# Patient Record
Sex: MALE | Race: WHITE | Employment: PART TIME | URBAN - METROPOLITAN AREA
[De-identification: names, ages, dates, MRNs, and addresses within clinical notes are randomized per-mention and may not be internally consistent; named-entity substitution may affect disease eponyms.]

---

## 2021-09-09 ENCOUNTER — HOSPITAL ENCOUNTER (EMERGENCY)
Facility: CLINIC | Age: 51
End: 2021-09-09
Payer: MEDICAID

## 2021-09-12 ENCOUNTER — APPOINTMENT (OUTPATIENT)
Dept: CT IMAGING | Facility: CLINIC | Age: 51
End: 2021-09-12
Attending: EMERGENCY MEDICINE
Payer: MEDICAID

## 2021-09-12 ENCOUNTER — HOSPITAL ENCOUNTER (INPATIENT)
Facility: CLINIC | Age: 51
LOS: 11 days | Discharge: NURSING, REHAB OR LONG-TERM CARE | End: 2021-09-23
Attending: EMERGENCY MEDICINE | Admitting: INTERNAL MEDICINE
Payer: MEDICAID

## 2021-09-12 DIAGNOSIS — E11.65 TYPE 2 DIABETES MELLITUS WITH HYPERGLYCEMIA, UNSPECIFIED WHETHER LONG TERM INSULIN USE (H): Primary | ICD-10-CM

## 2021-09-12 DIAGNOSIS — F41.9 ANXIETY: ICD-10-CM

## 2021-09-12 DIAGNOSIS — I10 ESSENTIAL HYPERTENSION: ICD-10-CM

## 2021-09-12 DIAGNOSIS — L03.221 CELLULITIS AND ABSCESS OF NECK: ICD-10-CM

## 2021-09-12 DIAGNOSIS — L97.519 ULCER OF RIGHT FOOT, UNSPECIFIED ULCER STAGE (H): ICD-10-CM

## 2021-09-12 DIAGNOSIS — A41.9 ACUTE SEPSIS (H): ICD-10-CM

## 2021-09-12 DIAGNOSIS — L02.11 CELLULITIS AND ABSCESS OF NECK: ICD-10-CM

## 2021-09-12 DIAGNOSIS — K59.03 DRUG-INDUCED CONSTIPATION: ICD-10-CM

## 2021-09-12 LAB
ALBUMIN SERPL-MCNC: 2.6 G/DL (ref 3.4–5)
ALP SERPL-CCNC: 178 U/L (ref 40–150)
ALT SERPL W P-5'-P-CCNC: 13 U/L (ref 0–70)
ANION GAP SERPL CALCULATED.3IONS-SCNC: 10 MMOL/L (ref 3–14)
AST SERPL W P-5'-P-CCNC: 7 U/L (ref 0–45)
BASOPHILS # BLD AUTO: 0.1 10E3/UL (ref 0–0.2)
BASOPHILS NFR BLD AUTO: 1 %
BILIRUB SERPL-MCNC: 0.6 MG/DL (ref 0.2–1.3)
BUN SERPL-MCNC: 17 MG/DL (ref 7–30)
CALCIUM SERPL-MCNC: 9.1 MG/DL (ref 8.5–10.1)
CHLORIDE BLD-SCNC: 97 MMOL/L (ref 94–109)
CO2 SERPL-SCNC: 26 MMOL/L (ref 20–32)
CREAT SERPL-MCNC: 1.24 MG/DL (ref 0.66–1.25)
EOSINOPHIL # BLD AUTO: 0.2 10E3/UL (ref 0–0.7)
EOSINOPHIL NFR BLD AUTO: 1 %
ERYTHROCYTE [DISTWIDTH] IN BLOOD BY AUTOMATED COUNT: 12.2 % (ref 10–15)
GFR SERPL CREATININE-BSD FRML MDRD: 66 ML/MIN/1.73M2
GLUCOSE BLD-MCNC: 430 MG/DL (ref 70–99)
GLUCOSE BLDC GLUCOMTR-MCNC: 320 MG/DL (ref 70–99)
GLUCOSE BLDC GLUCOMTR-MCNC: 386 MG/DL (ref 70–99)
HCO3 BLDV-SCNC: 29 MMOL/L (ref 21–28)
HCT VFR BLD AUTO: 39.6 % (ref 40–53)
HGB BLD-MCNC: 13.2 G/DL (ref 13.3–17.7)
IMM GRANULOCYTES # BLD: 0.2 10E3/UL
IMM GRANULOCYTES NFR BLD: 1 %
LACTATE BLD-SCNC: 1.2 MMOL/L
LYMPHOCYTES # BLD AUTO: 1.1 10E3/UL (ref 0.8–5.3)
LYMPHOCYTES NFR BLD AUTO: 7 %
MAGNESIUM SERPL-MCNC: 1.8 MG/DL (ref 1.6–2.3)
MCH RBC QN AUTO: 27.7 PG (ref 26.5–33)
MCHC RBC AUTO-ENTMCNC: 33.3 G/DL (ref 31.5–36.5)
MCV RBC AUTO: 83 FL (ref 78–100)
MONOCYTES # BLD AUTO: 1.4 10E3/UL (ref 0–1.3)
MONOCYTES NFR BLD AUTO: 8 %
NEUTROPHILS # BLD AUTO: 13.4 10E3/UL (ref 1.6–8.3)
NEUTROPHILS NFR BLD AUTO: 82 %
NRBC # BLD AUTO: 0 10E3/UL
NRBC BLD AUTO-RTO: 0 /100
PCO2 BLDV: 50 MM HG (ref 40–50)
PH BLDV: 7.37 [PH] (ref 7.32–7.43)
PLATELET # BLD AUTO: 322 10E3/UL (ref 150–450)
PO2 BLDV: 18 MM HG (ref 25–47)
POTASSIUM BLD-SCNC: 4.3 MMOL/L (ref 3.4–5.3)
PROT SERPL-MCNC: 7.9 G/DL (ref 6.8–8.8)
RBC # BLD AUTO: 4.77 10E6/UL (ref 4.4–5.9)
SAO2 % BLDV: 25 % (ref 94–100)
SARS-COV-2 RNA RESP QL NAA+PROBE: NEGATIVE
SODIUM SERPL-SCNC: 133 MMOL/L (ref 133–144)
WBC # BLD AUTO: 16.4 10E3/UL (ref 4–11)

## 2021-09-12 PROCEDURE — 258N000003 HC RX IP 258 OP 636: Performed by: INTERNAL MEDICINE

## 2021-09-12 PROCEDURE — 70491 CT SOFT TISSUE NECK W/DYE: CPT

## 2021-09-12 PROCEDURE — 82803 BLOOD GASES ANY COMBINATION: CPT

## 2021-09-12 PROCEDURE — 96375 TX/PRO/DX INJ NEW DRUG ADDON: CPT

## 2021-09-12 PROCEDURE — 93005 ELECTROCARDIOGRAM TRACING: CPT

## 2021-09-12 PROCEDURE — 250N000009 HC RX 250: Performed by: EMERGENCY MEDICINE

## 2021-09-12 PROCEDURE — 36415 COLL VENOUS BLD VENIPUNCTURE: CPT | Performed by: EMERGENCY MEDICINE

## 2021-09-12 PROCEDURE — 250N000011 HC RX IP 250 OP 636: Performed by: EMERGENCY MEDICINE

## 2021-09-12 PROCEDURE — 83036 HEMOGLOBIN GLYCOSYLATED A1C: CPT | Performed by: INTERNAL MEDICINE

## 2021-09-12 PROCEDURE — 87635 SARS-COV-2 COVID-19 AMP PRB: CPT | Performed by: EMERGENCY MEDICINE

## 2021-09-12 PROCEDURE — 250N000012 HC RX MED GY IP 250 OP 636 PS 637: Performed by: INTERNAL MEDICINE

## 2021-09-12 PROCEDURE — C9803 HOPD COVID-19 SPEC COLLECT: HCPCS

## 2021-09-12 PROCEDURE — 90471 IMMUNIZATION ADMIN: CPT | Performed by: EMERGENCY MEDICINE

## 2021-09-12 PROCEDURE — 96365 THER/PROPH/DIAG IV INF INIT: CPT

## 2021-09-12 PROCEDURE — 85025 COMPLETE CBC W/AUTO DIFF WBC: CPT | Performed by: EMERGENCY MEDICINE

## 2021-09-12 PROCEDURE — 258N000003 HC RX IP 258 OP 636: Performed by: EMERGENCY MEDICINE

## 2021-09-12 PROCEDURE — 99223 1ST HOSP IP/OBS HIGH 75: CPT | Mod: AI | Performed by: INTERNAL MEDICINE

## 2021-09-12 PROCEDURE — 99285 EMERGENCY DEPT VISIT HI MDM: CPT | Mod: 25

## 2021-09-12 PROCEDURE — 83735 ASSAY OF MAGNESIUM: CPT | Performed by: INTERNAL MEDICINE

## 2021-09-12 PROCEDURE — 250N000013 HC RX MED GY IP 250 OP 250 PS 637: Performed by: INTERNAL MEDICINE

## 2021-09-12 PROCEDURE — 82040 ASSAY OF SERUM ALBUMIN: CPT | Performed by: EMERGENCY MEDICINE

## 2021-09-12 PROCEDURE — 87040 BLOOD CULTURE FOR BACTERIA: CPT | Performed by: EMERGENCY MEDICINE

## 2021-09-12 PROCEDURE — 90715 TDAP VACCINE 7 YRS/> IM: CPT | Performed by: EMERGENCY MEDICINE

## 2021-09-12 PROCEDURE — 120N000001 HC R&B MED SURG/OB

## 2021-09-12 RX ORDER — PIPERACILLIN SODIUM, TAZOBACTAM SODIUM 4; .5 G/20ML; G/20ML
4.5 INJECTION, POWDER, LYOPHILIZED, FOR SOLUTION INTRAVENOUS ONCE
Status: COMPLETED | OUTPATIENT
Start: 2021-09-12 | End: 2021-09-12

## 2021-09-12 RX ORDER — HYDROMORPHONE HYDROCHLORIDE 1 MG/ML
.3-.5 INJECTION, SOLUTION INTRAMUSCULAR; INTRAVENOUS; SUBCUTANEOUS
Status: DISCONTINUED | OUTPATIENT
Start: 2021-09-12 | End: 2021-09-19

## 2021-09-12 RX ORDER — AMOXICILLIN 250 MG
1 CAPSULE ORAL 2 TIMES DAILY PRN
Status: DISCONTINUED | OUTPATIENT
Start: 2021-09-12 | End: 2021-09-23 | Stop reason: HOSPADM

## 2021-09-12 RX ORDER — NALOXONE HYDROCHLORIDE 0.4 MG/ML
0.4 INJECTION, SOLUTION INTRAMUSCULAR; INTRAVENOUS; SUBCUTANEOUS
Status: DISCONTINUED | OUTPATIENT
Start: 2021-09-12 | End: 2021-09-23 | Stop reason: HOSPADM

## 2021-09-12 RX ORDER — ACETAMINOPHEN 650 MG/1
650 SUPPOSITORY RECTAL EVERY 6 HOURS PRN
Status: DISCONTINUED | OUTPATIENT
Start: 2021-09-12 | End: 2021-09-23 | Stop reason: HOSPADM

## 2021-09-12 RX ORDER — DEXTROSE MONOHYDRATE 25 G/50ML
25-50 INJECTION, SOLUTION INTRAVENOUS
Status: DISCONTINUED | OUTPATIENT
Start: 2021-09-12 | End: 2021-09-23 | Stop reason: HOSPADM

## 2021-09-12 RX ORDER — PIPERACILLIN SODIUM, TAZOBACTAM SODIUM 3; .375 G/15ML; G/15ML
3.38 INJECTION, POWDER, LYOPHILIZED, FOR SOLUTION INTRAVENOUS EVERY 6 HOURS
Status: DISCONTINUED | OUTPATIENT
Start: 2021-09-13 | End: 2021-09-16

## 2021-09-12 RX ORDER — LIDOCAINE 40 MG/G
CREAM TOPICAL
Status: DISCONTINUED | OUTPATIENT
Start: 2021-09-12 | End: 2021-09-23 | Stop reason: HOSPADM

## 2021-09-12 RX ORDER — OXYCODONE HYDROCHLORIDE 5 MG/1
5-10 TABLET ORAL EVERY 4 HOURS PRN
Status: DISCONTINUED | OUTPATIENT
Start: 2021-09-12 | End: 2021-09-19

## 2021-09-12 RX ORDER — SODIUM CHLORIDE 9 MG/ML
INJECTION, SOLUTION INTRAVENOUS CONTINUOUS
Status: DISCONTINUED | OUTPATIENT
Start: 2021-09-12 | End: 2021-09-15

## 2021-09-12 RX ORDER — IOPAMIDOL 755 MG/ML
80 INJECTION, SOLUTION INTRAVASCULAR ONCE
Status: COMPLETED | OUTPATIENT
Start: 2021-09-12 | End: 2021-09-12

## 2021-09-12 RX ORDER — NALOXONE HYDROCHLORIDE 0.4 MG/ML
0.2 INJECTION, SOLUTION INTRAMUSCULAR; INTRAVENOUS; SUBCUTANEOUS
Status: DISCONTINUED | OUTPATIENT
Start: 2021-09-12 | End: 2021-09-23 | Stop reason: HOSPADM

## 2021-09-12 RX ORDER — ONDANSETRON 2 MG/ML
4 INJECTION INTRAMUSCULAR; INTRAVENOUS EVERY 6 HOURS PRN
Status: DISCONTINUED | OUTPATIENT
Start: 2021-09-12 | End: 2021-09-23 | Stop reason: HOSPADM

## 2021-09-12 RX ORDER — AMOXICILLIN 250 MG
2 CAPSULE ORAL 2 TIMES DAILY PRN
Status: DISCONTINUED | OUTPATIENT
Start: 2021-09-12 | End: 2021-09-23 | Stop reason: HOSPADM

## 2021-09-12 RX ORDER — DIAZEPAM 10 MG/2ML
5-10 INJECTION, SOLUTION INTRAMUSCULAR; INTRAVENOUS EVERY 4 HOURS PRN
Status: DISCONTINUED | OUTPATIENT
Start: 2021-09-12 | End: 2021-09-13

## 2021-09-12 RX ORDER — HYDROMORPHONE HYDROCHLORIDE 1 MG/ML
0.5 INJECTION, SOLUTION INTRAMUSCULAR; INTRAVENOUS; SUBCUTANEOUS
Status: DISCONTINUED | OUTPATIENT
Start: 2021-09-12 | End: 2021-09-12

## 2021-09-12 RX ORDER — VANCOMYCIN HYDROCHLORIDE 1 G/200ML
1000 INJECTION, SOLUTION INTRAVENOUS EVERY 12 HOURS
Status: DISCONTINUED | OUTPATIENT
Start: 2021-09-13 | End: 2021-09-14

## 2021-09-12 RX ORDER — ACETAMINOPHEN 325 MG/1
650 TABLET ORAL EVERY 6 HOURS PRN
Status: DISCONTINUED | OUTPATIENT
Start: 2021-09-12 | End: 2021-09-23 | Stop reason: HOSPADM

## 2021-09-12 RX ORDER — NICOTINE POLACRILEX 4 MG
15-30 LOZENGE BUCCAL
Status: DISCONTINUED | OUTPATIENT
Start: 2021-09-12 | End: 2021-09-23 | Stop reason: HOSPADM

## 2021-09-12 RX ORDER — CLONIDINE HYDROCHLORIDE 0.1 MG/1
0.1 TABLET ORAL EVERY 8 HOURS
Status: DISCONTINUED | OUTPATIENT
Start: 2021-09-12 | End: 2021-09-13

## 2021-09-12 RX ORDER — ONDANSETRON 4 MG/1
4 TABLET, ORALLY DISINTEGRATING ORAL EVERY 6 HOURS PRN
Status: DISCONTINUED | OUTPATIENT
Start: 2021-09-12 | End: 2021-09-23 | Stop reason: HOSPADM

## 2021-09-12 RX ADMIN — SODIUM CHLORIDE 70 ML: 9 INJECTION, SOLUTION INTRAVENOUS at 19:32

## 2021-09-12 RX ADMIN — VANCOMYCIN HYDROCHLORIDE 2500 MG: 5 INJECTION, POWDER, LYOPHILIZED, FOR SOLUTION INTRAVENOUS at 19:50

## 2021-09-12 RX ADMIN — SODIUM CHLORIDE 1000 ML: 9 INJECTION, SOLUTION INTRAVENOUS at 18:33

## 2021-09-12 RX ADMIN — CLONIDINE HYDROCHLORIDE 0.1 MG: 0.1 TABLET ORAL at 23:17

## 2021-09-12 RX ADMIN — IOPAMIDOL 80 ML: 755 INJECTION, SOLUTION INTRAVENOUS at 19:32

## 2021-09-12 RX ADMIN — HYDROMORPHONE HYDROCHLORIDE 0.5 MG: 1 INJECTION, SOLUTION INTRAMUSCULAR; INTRAVENOUS; SUBCUTANEOUS at 18:33

## 2021-09-12 RX ADMIN — CLOSTRIDIUM TETANI TOXOID ANTIGEN (FORMALDEHYDE INACTIVATED), CORYNEBACTERIUM DIPHTHERIAE TOXOID ANTIGEN (FORMALDEHYDE INACTIVATED), BORDETELLA PERTUSSIS TOXOID ANTIGEN (GLUTARALDEHYDE INACTIVATED), BORDETELLA PERTUSSIS FILAMENTOUS HEMAGGLUTININ ANTIGEN (FORMALDEHYDE INACTIVATED), BORDETELLA PERTUSSIS PERTACTIN ANTIGEN, AND BORDETELLA PERTUSSIS FIMBRIAE 2/3 ANTIGEN 0.5 ML: 5; 2; 2.5; 5; 3; 5 INJECTION, SUSPENSION INTRAMUSCULAR at 18:35

## 2021-09-12 RX ADMIN — SODIUM CHLORIDE, PRESERVATIVE FREE: 5 INJECTION INTRAVENOUS at 23:22

## 2021-09-12 RX ADMIN — INSULIN GLARGINE 20 UNITS: 100 INJECTION, SOLUTION SUBCUTANEOUS at 23:18

## 2021-09-12 RX ADMIN — PIPERACILLIN SODIUM AND TAZOBACTAM SODIUM 4.5 G: 4; .5 INJECTION, POWDER, LYOPHILIZED, FOR SOLUTION INTRAVENOUS at 18:37

## 2021-09-12 ASSESSMENT — ENCOUNTER SYMPTOMS
RHINORRHEA: 0
NUMBNESS: 0
VOMITING: 0
DIARRHEA: 0
CHEST TIGHTNESS: 0
NECK STIFFNESS: 0
WHEEZING: 0
CHILLS: 1
NAUSEA: 0
FEVER: 0
PALPITATIONS: 0
NECK PAIN: 1
SHORTNESS OF BREATH: 0
SORE THROAT: 0
COUGH: 0
ABDOMINAL PAIN: 0
DYSURIA: 0

## 2021-09-12 ASSESSMENT — MIFFLIN-ST. JEOR: SCORE: 2053.74

## 2021-09-12 NOTE — ED PROVIDER NOTES
History     Chief Complaint:  Wound Check      HPI   Chris Shea is a 50 year old diabetic male who admits to Methamphetamine abuse (denies IVDA) who presents by ambulance with chief complaint of infection to the back of his neck.  His glucose was 388 by the paramedics. The patient states that 2 to 3 days ago he noticed pimples on the back of his neck which he picked at which has rapidly progressed to a large area of the painful swelling to the back of his neck making it hard for him to lay flat on his back.  He has had some chills but no fever.  He denies cough/cold symptoms, denies chest pain or shortness of breath, denies nausea or vomiting or diarrhea.  No known COVID-19 exposure.  He admits to methamphetamine use, but denies IV drug abuse.  He denies any previous history of staph infection.  He also notes that he has a chronic ulcer on the bottom of his right foot.    Review of Systems   Constitutional: Positive for chills. Negative for fever.   HENT: Negative for rhinorrhea and sore throat.    Respiratory: Negative for cough, chest tightness, shortness of breath and wheezing.    Cardiovascular: Negative for chest pain, palpitations and leg swelling.   Gastrointestinal: Negative for abdominal pain, diarrhea, nausea and vomiting.   Genitourinary: Negative for dysuria.   Musculoskeletal: Positive for neck pain. Negative for neck stiffness.   Neurological: Negative for numbness.   All other systems reviewed and are negative.        Allergies:  The patient has no known allergies.    Medications:    Flexeril   Metformin     Past Medical History:    Diabetes, Type 2  HTN  Smoker  Back pain  Herniated Lumbar disc    Past Surgical History:    Shoulder surgery    Family History:    No family history on file.    Social History:  Methamphetamine use  Smoker    Physical Exam     Patient Vitals for the past 24 hrs:   BP Temp Temp src Pulse Resp SpO2 Height Weight   09/12/21 2239 121/80 97.5  F (36.4  C) Oral 89 16 98  "% -- --   09/12/21 1830 129/79 -- -- 105 16 98 % -- --   09/12/21 1817 114/74 98.4  F (36.9  C) Oral 103 17 96 % 1.88 m (6' 2\") 113.4 kg (250 lb)       Physical Exam  Nursing note and vitals reviewed.  Constitutional:  Appears well-developed and well-nourished.   HENT:   Head:    Atraumatic.   Mouth/Throat:   Oropharynx is clear and moist. No oropharyngeal exudate.   Eyes:    Pupils are equal, round, and reactive to light.   Neck:    Abscess as noted in skin exam.  Normal range of motion. Neck supple.      No tracheal deviation present. No thyromegaly present.   Cardiovascular:  Normal rate, regular rhythm, no murmur   Pulmonary/Chest: Breath sounds are clear and equal without wheezes or crackles.  Abdominal:   Soft. Bowel sounds are normal. Exhibits no distension and      no mass. There is no tenderness.      There is no rebound and no guarding.   Musculoskeletal:  Exhibits no edema.   Lymphadenopathy:  No cervical adenopathy.   Neurological:   Alert and oriented to person, place, and time.   Skin:    Skin is warm and dry. No rash noted. No pallor. Right foot:  2 cm diameter deep ulcer to plantar aspect of right foot under ball of foot.  No surrounding cellulitis.  8 cm vertical X 10 cm horizontal diameter area of abscess with cellulitis with swelling and dark erythema and firm tenderness to the back of the neck with central pustules.      Emergency Department Course   ECG:  ECG taken at 18:29, ECG read at 18:31  Rate 105 bpm. OH interval 134 ms. QRS duration 100 ms. QT/QTc 346/457 ms. P-R-T axes 61 76 28.     Imaging:  Soft tissue neck CT w contrast   Final Result   IMPRESSION:     Ill-defined, moderately extensive inflammatory process involving the   posterior upper cervical and occipitocervical soft tissues. No   evidence for discrete drainable abscess, bone involvement or deep   intraspinal extension. Mild muscular swelling and interspersed edema   noted. Probable extension of presumed cellulitis into the " spaces,   however myositis remains a possibility. No evidence for discrete,   intramuscular masses or fluid collections.         HAY JONES MD            SYSTEM ID:  [id number]          Laboratory:  Labs Ordered and Resulted from Time of ED Arrival Up to the Time of Departure from the ED   COMPREHENSIVE METABOLIC PANEL - Abnormal; Notable for the following components:       Result Value    Glucose 430 (*)     Alkaline Phosphatase 178 (*)     Albumin 2.6 (*)     All other components within normal limits   CBC WITH PLATELETS AND DIFFERENTIAL - Abnormal; Notable for the following components:    WBC Count 16.4 (*)     Hemoglobin 13.2 (*)     Hematocrit 39.6 (*)     Absolute Neutrophils 13.4 (*)     Absolute Monocytes 1.4 (*)     Absolute Immature Granulocytes 0.2 (*)     All other components within normal limits   ISTAT GASES LACTATE VENOUS POCT - Abnormal; Notable for the following components:    Bicarbonate Venous POCT 29 (*)     O2 Sat, Venous POCT 25 (*)     pO2 Venous POCT 18 (*)     All other components within normal limits   GLUCOSE BY METER - Abnormal; Notable for the following components:    GLUCOSE BY METER POCT 320 (*)     All other components within normal limits   COVID-19 VIRUS (CORONAVIRUS) BY PCR - Normal    Narrative:     Testing was performed using the sandro  SARS-CoV-2 & Influenza A/B Assay on the sandro  Neeta  System.  This test should be ordered for the detection of SARS-COV-2 in individuals who meet SARS-CoV-2 clinical and/or epidemiological criteria. Test performance is unknown in asymptomatic patients.  This test is for in vitro diagnostic use under the FDA EUA for laboratories certified under CLIA to perform moderate and/or high complexity testing. This test has not been FDA cleared or approved.  A negative test does not rule out the presence of PCR inhibitors in the specimen or target RNA in concentration below the limit of detection for the assay. The possibility of a false negative  should be considered if the patient's recent exposure or clinical presentation suggests COVID-19.  [facility] are certified under the Clinical Laboratory Improvement Amendments of 1988 (CLIA-88) as qualified to perform moderate and/or high complexity laboratory testing.   CBC WITH PLATELETS & DIFFERENTIAL    Narrative:     The following orders were created for panel order CBC with platelets differential.  Procedure                               Abnormality         Status                     ---------                               -----------         ------                     CBC with platelets and d...[[id number]]  Abnormal            Final result                 Please view results for these tests on the individual orders.   BLOOD CULTURE   BLOOD CULTURE     Emergency Department Course:    Assessments:  18:25 I obtained history and examined the patient as noted above.   I rechecked the patient and explained findings.     Consults:   General Surgery Dr. Lonnie Calloway was consulted.    Interventions:  Medications   lidocaine 1 % 0.1-1 mL (has no administration in time range)   lidocaine (LMX4) cream (has no administration in time range)   sodium chloride (PF) 0.9% PF flush 3 mL (3 mLs Intracatheter Not Given 9/12/21 6957)   sodium chloride (PF) 0.9% PF flush 3 mL (has no administration in time range)   sodium chloride 0.9% infusion ( Intravenous New Bag 9/12/21 2322)   acetaminophen (TYLENOL) tablet 650 mg (has no administration in time range)     Or   acetaminophen (TYLENOL) Suppository 650 mg (has no administration in time range)   oxyCODONE (ROXICODONE) tablet 5-10 mg (has no administration in time range)   HYDROmorphone (PF) (DILAUDID) injection 0.3-0.5 mg (has no administration in time range)   senna-docusate (SENOKOT-S/PERICOLACE) 8.6-50 MG per tablet 1 tablet (has no administration in time range)     Or   senna-docusate (SENOKOT-S/PERICOLACE) 8.6-50 MG per tablet 2 tablet (has no  administration in time range)   melatonin tablet 5 mg (has no administration in time range)   ondansetron (ZOFRAN-ODT) ODT tab 4 mg (has no administration in time range)     Or   ondansetron (ZOFRAN) injection 4 mg (has no administration in time range)   piperacillin-tazobactam (ZOSYN) 3.375 g vial to attach to  mL bag (has no administration in time range)   diazepam (VALIUM) injection 5-10 mg (has no administration in time range)   glucose gel 15-30 g (has no administration in time range)     Or   dextrose 50 % injection 25-50 mL (has no administration in time range)     Or   glucagon injection 1 mg (has no administration in time range)   insulin aspart (NovoLOG) injection (RAPID ACTING) (has no administration in time range)   insulin aspart (NovoLOG) injection (RAPID ACTING) (7 Units Subcutaneous Given 9/12/21 2318)   insulin glargine (LANTUS PEN) injection 20 Units (20 Units Subcutaneous Given 9/12/21 2318)   cloNIDine (CATAPRES) tablet 0.1 mg (0.1 mg Oral Given 9/12/21 2317)   naloxone (NARCAN) injection 0.2 mg (has no administration in time range)     Or   naloxone (NARCAN) injection 0.4 mg (has no administration in time range)     Or   naloxone (NARCAN) injection 0.2 mg (has no administration in time range)     Or   naloxone (NARCAN) injection 0.4 mg (has no administration in time range)   vancomycin (VANCOCIN) 1000 mg in dextrose 5% 200 mL PREMIX (has no administration in time range)   piperacillin-tazobactam (ZOSYN) 4.5 g vial to attach to  mL bag (0 g Intravenous Stopped 9/12/21 1949)   0.9% sodium chloride BOLUS (1,000 mLs Intravenous New Bag 9/12/21 1833)   Tdap (tetanus-diphtheria-acell pertussis) (ADACEL) injection 0.5 mL (0.5 mLs Intramuscular Given 9/12/21 1835)   vancomycin 2500 mg in 0.9% NaCl 500 ml intermittent infusion 2,500 mg (2,500 mg Intravenous New Bag 9/12/21 1950)   Saline (70 mLs As instructed Given 9/12/21 1932)   iopamidol (ISOVUE-370) solution 80 mL (80 mLs Intravenous  Given 9/12/21 1932)       Disposition:  The patient was admitted to the hospital under the care of Dr. Ibarra .    Impression & Plan      Medical Decision Making:  I found this patient to have sepsis syndrome due to cellulitis of the posterior aspect of his neck, therefore he was given IV Zosyn and Vancomycin for broad spectrum antibiotics and admitted to the hospital under the care of Dr. Whitley.  I consulted Dr. Lonine Calloway for general surgery since I am concerned about an abscess, however CT did not show any definite fluid collection/abscess.  I am concerned about possible MRSA infection, so blood cultures were drawn.  He admits to methamphetamine use, although he denies IVDA.  He does not have any heart murmur to suggest endocarditis.  I also found him to have a chronic right foot ulcer without cellulitis.    Covid-19  Chris Shea was evaluated during a global COVID-19 pandemic, which necessitated consideration that the patient might be at risk for infection with the SARS-CoV-2 virus that causes COVID-19.   Applicable protocols for evaluation were followed during the patient's care.   COVID-19 was considered as part of the patient's evaluation. The plan for testing is:  a test was obtained during this visit.    Diagnosis:    ICD-10-CM    1. Acute sepsis (H)  A41.9    2. Cellulitis and abscess of neck  L03.221     L02.11    3. Ulcer of right foot, unspecified ulcer stage (H)  L97.519        Scribe Disclosure:  I, Namita Isaac, am serving as a scribe at 6:28 PM on 9/12/2021 to document services personally performed by Leigh Ann Blake MD based on my observations and the provider's statements to me.      Leigh Ann Blake MD  09/13/21 0310       Leigh Ann Blake MD  09/13/21 0311

## 2021-09-12 NOTE — H&P (VIEW-ONLY)
History     Chief Complaint:  Wound Check      HPI   Chris Shea is a 50 year old diabetic male who admits to Methamphetamine abuse (denies IVDA) who presents by ambulance with chief complaint of infection to the back of his neck.  His glucose was 388 by the paramedics. The patient states that 2 to 3 days ago he noticed pimples on the back of his neck which he picked at which has rapidly progressed to a large area of the painful swelling to the back of his neck making it hard for him to lay flat on his back.  He has had some chills but no fever.  He denies cough/cold symptoms, denies chest pain or shortness of breath, denies nausea or vomiting or diarrhea.  No known COVID-19 exposure.  He admits to methamphetamine use, but denies IV drug abuse.  He denies any previous history of staph infection.  He also notes that he has a chronic ulcer on the bottom of his right foot.    Review of Systems   Constitutional: Positive for chills. Negative for fever.   HENT: Negative for rhinorrhea and sore throat.    Respiratory: Negative for cough, chest tightness, shortness of breath and wheezing.    Cardiovascular: Negative for chest pain, palpitations and leg swelling.   Gastrointestinal: Negative for abdominal pain, diarrhea, nausea and vomiting.   Genitourinary: Negative for dysuria.   Musculoskeletal: Positive for neck pain. Negative for neck stiffness.   Neurological: Negative for numbness.   All other systems reviewed and are negative.        Allergies:  The patient has no known allergies.    Medications:    Flexeril   Metformin     Past Medical History:    Diabetes, Type 2  HTN  Smoker  Back pain  Herniated Lumbar disc    Past Surgical History:    Shoulder surgery    Family History:    No family history on file.    Social History:  Methamphetamine use  Smoker    Physical Exam     Patient Vitals for the past 24 hrs:   BP Temp Temp src Pulse Resp SpO2 Height Weight   09/12/21 2239 121/80 97.5  F (36.4  C) Oral 89 16 98  "% -- --   09/12/21 1830 129/79 -- -- 105 16 98 % -- --   09/12/21 1817 114/74 98.4  F (36.9  C) Oral 103 17 96 % 1.88 m (6' 2\") 113.4 kg (250 lb)       Physical Exam  Nursing note and vitals reviewed.  Constitutional:  Appears well-developed and well-nourished.   HENT:   Head:    Atraumatic.   Mouth/Throat:   Oropharynx is clear and moist. No oropharyngeal exudate.   Eyes:    Pupils are equal, round, and reactive to light.   Neck:    Abscess as noted in skin exam.  Normal range of motion. Neck supple.      No tracheal deviation present. No thyromegaly present.   Cardiovascular:  Normal rate, regular rhythm, no murmur   Pulmonary/Chest: Breath sounds are clear and equal without wheezes or crackles.  Abdominal:   Soft. Bowel sounds are normal. Exhibits no distension and      no mass. There is no tenderness.      There is no rebound and no guarding.   Musculoskeletal:  Exhibits no edema.   Lymphadenopathy:  No cervical adenopathy.   Neurological:   Alert and oriented to person, place, and time.   Skin:    Skin is warm and dry. No rash noted. No pallor. Right foot:  2 cm diameter deep ulcer to plantar aspect of right foot under ball of foot.  No surrounding cellulitis.  8 cm vertical X 10 cm horizontal diameter area of abscess with cellulitis with swelling and dark erythema and firm tenderness to the back of the neck with central pustules.      Emergency Department Course   ECG:  ECG taken at 18:29, ECG read at 18:31  Rate 105 bpm. CA interval 134 ms. QRS duration 100 ms. QT/QTc 346/457 ms. P-R-T axes 61 76 28.     Imaging:  Soft tissue neck CT w contrast   Final Result   IMPRESSION:     Ill-defined, moderately extensive inflammatory process involving the   posterior upper cervical and occipitocervical soft tissues. No   evidence for discrete drainable abscess, bone involvement or deep   intraspinal extension. Mild muscular swelling and interspersed edema   noted. Probable extension of presumed cellulitis into the " spaces,   however myositis remains a possibility. No evidence for discrete,   intramuscular masses or fluid collections.         HAY JONES MD            SYSTEM ID:  [id number]          Laboratory:  Labs Ordered and Resulted from Time of ED Arrival Up to the Time of Departure from the ED   COMPREHENSIVE METABOLIC PANEL - Abnormal; Notable for the following components:       Result Value    Glucose 430 (*)     Alkaline Phosphatase 178 (*)     Albumin 2.6 (*)     All other components within normal limits   CBC WITH PLATELETS AND DIFFERENTIAL - Abnormal; Notable for the following components:    WBC Count 16.4 (*)     Hemoglobin 13.2 (*)     Hematocrit 39.6 (*)     Absolute Neutrophils 13.4 (*)     Absolute Monocytes 1.4 (*)     Absolute Immature Granulocytes 0.2 (*)     All other components within normal limits   ISTAT GASES LACTATE VENOUS POCT - Abnormal; Notable for the following components:    Bicarbonate Venous POCT 29 (*)     O2 Sat, Venous POCT 25 (*)     pO2 Venous POCT 18 (*)     All other components within normal limits   GLUCOSE BY METER - Abnormal; Notable for the following components:    GLUCOSE BY METER POCT 320 (*)     All other components within normal limits   COVID-19 VIRUS (CORONAVIRUS) BY PCR - Normal    Narrative:     Testing was performed using the sandro  SARS-CoV-2 & Influenza A/B Assay on the sandro  Neeta  System.  This test should be ordered for the detection of SARS-COV-2 in individuals who meet SARS-CoV-2 clinical and/or epidemiological criteria. Test performance is unknown in asymptomatic patients.  This test is for in vitro diagnostic use under the FDA EUA for laboratories certified under CLIA to perform moderate and/or high complexity testing. This test has not been FDA cleared or approved.  A negative test does not rule out the presence of PCR inhibitors in the specimen or target RNA in concentration below the limit of detection for the assay. The possibility of a false negative  should be considered if the patient's recent exposure or clinical presentation suggests COVID-19.  [facility] are certified under the Clinical Laboratory Improvement Amendments of 1988 (CLIA-88) as qualified to perform moderate and/or high complexity laboratory testing.   CBC WITH PLATELETS & DIFFERENTIAL    Narrative:     The following orders were created for panel order CBC with platelets differential.  Procedure                               Abnormality         Status                     ---------                               -----------         ------                     CBC with platelets and d...[[id number]]  Abnormal            Final result                 Please view results for these tests on the individual orders.   BLOOD CULTURE   BLOOD CULTURE     Emergency Department Course:    Assessments:  18:25 I obtained history and examined the patient as noted above.   I rechecked the patient and explained findings.     Consults:   General Surgery Dr. Lonnie Calloway was consulted.    Interventions:  Medications   lidocaine 1 % 0.1-1 mL (has no administration in time range)   lidocaine (LMX4) cream (has no administration in time range)   sodium chloride (PF) 0.9% PF flush 3 mL (3 mLs Intracatheter Not Given 9/12/21 8527)   sodium chloride (PF) 0.9% PF flush 3 mL (has no administration in time range)   sodium chloride 0.9% infusion ( Intravenous New Bag 9/12/21 2322)   acetaminophen (TYLENOL) tablet 650 mg (has no administration in time range)     Or   acetaminophen (TYLENOL) Suppository 650 mg (has no administration in time range)   oxyCODONE (ROXICODONE) tablet 5-10 mg (has no administration in time range)   HYDROmorphone (PF) (DILAUDID) injection 0.3-0.5 mg (has no administration in time range)   senna-docusate (SENOKOT-S/PERICOLACE) 8.6-50 MG per tablet 1 tablet (has no administration in time range)     Or   senna-docusate (SENOKOT-S/PERICOLACE) 8.6-50 MG per tablet 2 tablet (has no  administration in time range)   melatonin tablet 5 mg (has no administration in time range)   ondansetron (ZOFRAN-ODT) ODT tab 4 mg (has no administration in time range)     Or   ondansetron (ZOFRAN) injection 4 mg (has no administration in time range)   piperacillin-tazobactam (ZOSYN) 3.375 g vial to attach to  mL bag (has no administration in time range)   diazepam (VALIUM) injection 5-10 mg (has no administration in time range)   glucose gel 15-30 g (has no administration in time range)     Or   dextrose 50 % injection 25-50 mL (has no administration in time range)     Or   glucagon injection 1 mg (has no administration in time range)   insulin aspart (NovoLOG) injection (RAPID ACTING) (has no administration in time range)   insulin aspart (NovoLOG) injection (RAPID ACTING) (7 Units Subcutaneous Given 9/12/21 2318)   insulin glargine (LANTUS PEN) injection 20 Units (20 Units Subcutaneous Given 9/12/21 2318)   cloNIDine (CATAPRES) tablet 0.1 mg (0.1 mg Oral Given 9/12/21 2317)   naloxone (NARCAN) injection 0.2 mg (has no administration in time range)     Or   naloxone (NARCAN) injection 0.4 mg (has no administration in time range)     Or   naloxone (NARCAN) injection 0.2 mg (has no administration in time range)     Or   naloxone (NARCAN) injection 0.4 mg (has no administration in time range)   vancomycin (VANCOCIN) 1000 mg in dextrose 5% 200 mL PREMIX (has no administration in time range)   piperacillin-tazobactam (ZOSYN) 4.5 g vial to attach to  mL bag (0 g Intravenous Stopped 9/12/21 1949)   0.9% sodium chloride BOLUS (1,000 mLs Intravenous New Bag 9/12/21 1833)   Tdap (tetanus-diphtheria-acell pertussis) (ADACEL) injection 0.5 mL (0.5 mLs Intramuscular Given 9/12/21 1835)   vancomycin 2500 mg in 0.9% NaCl 500 ml intermittent infusion 2,500 mg (2,500 mg Intravenous New Bag 9/12/21 1950)   Saline (70 mLs As instructed Given 9/12/21 1932)   iopamidol (ISOVUE-370) solution 80 mL (80 mLs Intravenous  Given 9/12/21 1932)       Disposition:  The patient was admitted to the hospital under the care of Dr. Ibarra .    Impression & Plan      Medical Decision Making:  I found this patient to have sepsis syndrome due to cellulitis of the posterior aspect of his neck, therefore he was given IV Zosyn and Vancomycin for broad spectrum antibiotics and admitted to the hospital under the care of Dr. Whitley.  I consulted Dr. Lonnie Calloway for general surgery since I am concerned about an abscess, however CT did not show any definite fluid collection/abscess.  I am concerned about possible MRSA infection, so blood cultures were drawn.  He admits to methamphetamine use, although he denies IVDA.  He does not have any heart murmur to suggest endocarditis.  I also found him to have a chronic right foot ulcer without cellulitis.    Covid-19  Chris Shea was evaluated during a global COVID-19 pandemic, which necessitated consideration that the patient might be at risk for infection with the SARS-CoV-2 virus that causes COVID-19.   Applicable protocols for evaluation were followed during the patient's care.   COVID-19 was considered as part of the patient's evaluation. The plan for testing is:  a test was obtained during this visit.    Diagnosis:    ICD-10-CM    1. Acute sepsis (H)  A41.9    2. Cellulitis and abscess of neck  L03.221     L02.11    3. Ulcer of right foot, unspecified ulcer stage (H)  L97.519        Scribe Disclosure:  I, Namita Isaac, am serving as a scribe at 6:28 PM on 9/12/2021 to document services personally performed by Leigh Ann Blake MD based on my observations and the provider's statements to me.      Leigh Ann Blake MD  09/13/21 0310       Leigh Ann Blake MD  09/13/21 0311

## 2021-09-12 NOTE — ED NOTES
"[facility]  ED Nurse Handoff Report    ED Chief complaint: Wound Check      ED Diagnosis:   Final diagnoses:   Acute sepsis (H)   Cellulitis and abscess of neck   Ulcer of right foot, unspecified ulcer stage (H)       Code Status: Full Code    Allergies: No Known Allergies    Patient Story: Pt comes from home.  Hx DM type II.  Reports noticed a wound to posterior head 3 days ago and has been increasing in pain, swelling, and redness.    Focused Assessment:  Infected abscess present to posterior head - blood cultures x2 and ABs started (zosyn and vanco)  Will most likely need I&D in OR.  Last ate/drank after 3:30PM.  Wound to L foot and pimple looking wound to L post upper leg.  Pt feeling increased weakness and was unsteady on feet when transferring him to bed from EMS cart.  A/o x4. Dilaudid for pain.    Treatments and/or interventions provided: see above  Patient's response to treatments and/or interventions: pain decreased    To be done/followed up on inpatient unit:  na    Does this patient have any cognitive concerns?: NA    Activity level - Baseline/Home:  Independent  Activity Level - Current:   Stand with Assist    Patient's Preferred language: English   Needed?: No    Isolation: None  Infection: Not Applicable  Patient tested for COVID 19 prior to admission: YES  Bariatric?: No    Vital Signs:   Vitals:    09/12/21 1817   BP: 114/74   Pulse: 103   Resp: 17   Temp: 98.4  F (36.9  C)   TempSrc: Oral   SpO2: 96%   Weight: 113.4 kg (250 lb)   Height: 1.88 m (6' 2\")       Cardiac Rhythm:Cardiac Rhythm: Sinus tachycardia    Was the PSS-3 completed:   Yes  What interventions are required if any?               Family Comments: na  OBS brochure/video discussed/provided to patient/family: N/A              Name of person given brochure if not patient:               Relationship to patient:     For the majority of the shift this patient's behavior was Green.   Behavioral interventions " performed were .    ED NURSE PHONE NUMBER: *[id number]

## 2021-09-13 ENCOUNTER — ANESTHESIA EVENT (OUTPATIENT)
Dept: SURGERY | Facility: CLINIC | Age: 51
End: 2021-09-13
Payer: MEDICAID

## 2021-09-13 ENCOUNTER — ANESTHESIA (OUTPATIENT)
Dept: SURGERY | Facility: CLINIC | Age: 51
End: 2021-09-13
Payer: MEDICAID

## 2021-09-13 ENCOUNTER — APPOINTMENT (OUTPATIENT)
Dept: GENERAL RADIOLOGY | Facility: CLINIC | Age: 51
End: 2021-09-13
Attending: PODIATRIST
Payer: MEDICAID

## 2021-09-13 LAB
ANION GAP SERPL CALCULATED.3IONS-SCNC: 9 MMOL/L (ref 3–14)
ATRIAL RATE - MUSE: 105 BPM
BUN SERPL-MCNC: 15 MG/DL (ref 7–30)
CALCIUM SERPL-MCNC: 8.5 MG/DL (ref 8.5–10.1)
CHLORIDE BLD-SCNC: 102 MMOL/L (ref 94–109)
CO2 SERPL-SCNC: 24 MMOL/L (ref 20–32)
CREAT SERPL-MCNC: 1.15 MG/DL (ref 0.66–1.25)
DIASTOLIC BLOOD PRESSURE - MUSE: NORMAL MMHG
ERYTHROCYTE [DISTWIDTH] IN BLOOD BY AUTOMATED COUNT: 12.4 % (ref 10–15)
GFR SERPL CREATININE-BSD FRML MDRD: 73 ML/MIN/1.73M2
GLUCOSE BLD-MCNC: 314 MG/DL (ref 70–99)
GLUCOSE BLDC GLUCOMTR-MCNC: 282 MG/DL (ref 70–99)
GLUCOSE BLDC GLUCOMTR-MCNC: 287 MG/DL (ref 70–99)
GLUCOSE BLDC GLUCOMTR-MCNC: 289 MG/DL (ref 70–99)
GLUCOSE BLDC GLUCOMTR-MCNC: 317 MG/DL (ref 70–99)
GLUCOSE BLDC GLUCOMTR-MCNC: 328 MG/DL (ref 70–99)
GLUCOSE BLDC GLUCOMTR-MCNC: 347 MG/DL (ref 70–99)
HBA1C MFR BLD: 13.3 % (ref 0–5.6)
HCT VFR BLD AUTO: 36.6 % (ref 40–53)
HGB BLD-MCNC: 12.2 G/DL (ref 13.3–17.7)
INTERPRETATION ECG - MUSE: NORMAL
MCH RBC QN AUTO: 27.3 PG (ref 26.5–33)
MCHC RBC AUTO-ENTMCNC: 33.3 G/DL (ref 31.5–36.5)
MCV RBC AUTO: 82 FL (ref 78–100)
P AXIS - MUSE: 61 DEGREES
PLATELET # BLD AUTO: 286 10E3/UL (ref 150–450)
POTASSIUM BLD-SCNC: 4.2 MMOL/L (ref 3.4–5.3)
PR INTERVAL - MUSE: 134 MS
QRS DURATION - MUSE: 100 MS
QT - MUSE: 346 MS
QTC - MUSE: 457 MS
R AXIS - MUSE: 76 DEGREES
RBC # BLD AUTO: 4.47 10E6/UL (ref 4.4–5.9)
SODIUM SERPL-SCNC: 135 MMOL/L (ref 133–144)
SYSTOLIC BLOOD PRESSURE - MUSE: NORMAL MMHG
T AXIS - MUSE: 28 DEGREES
VENTRICULAR RATE- MUSE: 105 BPM
WBC # BLD AUTO: 17.9 10E3/UL (ref 4–11)

## 2021-09-13 PROCEDURE — 80048 BASIC METABOLIC PNL TOTAL CA: CPT | Performed by: INTERNAL MEDICINE

## 2021-09-13 PROCEDURE — 250N000012 HC RX MED GY IP 250 OP 636 PS 637: Performed by: INTERNAL MEDICINE

## 2021-09-13 PROCEDURE — 250N000011 HC RX IP 250 OP 636: Performed by: INTERNAL MEDICINE

## 2021-09-13 PROCEDURE — 250N000013 HC RX MED GY IP 250 OP 250 PS 637: Performed by: INTERNAL MEDICINE

## 2021-09-13 PROCEDURE — 99207 PR CONSULT E&M CHANGED TO INITIAL LEVEL: CPT | Performed by: PSYCHIATRY & NEUROLOGY

## 2021-09-13 PROCEDURE — 99253 IP/OBS CNSLTJ NEW/EST LOW 45: CPT | Performed by: PODIATRIST

## 2021-09-13 PROCEDURE — 99222 1ST HOSP IP/OBS MODERATE 55: CPT | Performed by: PSYCHIATRY & NEUROLOGY

## 2021-09-13 PROCEDURE — 85041 AUTOMATED RBC COUNT: CPT | Performed by: INTERNAL MEDICINE

## 2021-09-13 PROCEDURE — 99232 SBSQ HOSP IP/OBS MODERATE 35: CPT | Performed by: HOSPITALIST

## 2021-09-13 PROCEDURE — L3260 AMBULATORY SURGICAL BOOT EAC: HCPCS

## 2021-09-13 PROCEDURE — 258N000003 HC RX IP 258 OP 636: Performed by: INTERNAL MEDICINE

## 2021-09-13 PROCEDURE — 73630 X-RAY EXAM OF FOOT: CPT | Mod: RT

## 2021-09-13 PROCEDURE — 120N000001 HC R&B MED SURG/OB

## 2021-09-13 PROCEDURE — 36415 COLL VENOUS BLD VENIPUNCTURE: CPT | Performed by: INTERNAL MEDICINE

## 2021-09-13 PROCEDURE — 99221 1ST HOSP IP/OBS SF/LOW 40: CPT | Performed by: SURGERY

## 2021-09-13 RX ORDER — HYDROXYZINE HYDROCHLORIDE 25 MG/1
25 TABLET, FILM COATED ORAL 3 TIMES DAILY PRN
Status: DISCONTINUED | OUTPATIENT
Start: 2021-09-13 | End: 2021-09-23 | Stop reason: HOSPADM

## 2021-09-13 RX ADMIN — INSULIN ASPART 8 UNITS: 100 INJECTION, SOLUTION INTRAVENOUS; SUBCUTANEOUS at 17:27

## 2021-09-13 RX ADMIN — INSULIN ASPART 6 UNITS: 100 INJECTION, SOLUTION INTRAVENOUS; SUBCUTANEOUS at 13:44

## 2021-09-13 RX ADMIN — INSULIN GLARGINE 20 UNITS: 100 INJECTION, SOLUTION SUBCUTANEOUS at 20:33

## 2021-09-13 RX ADMIN — HYDROMORPHONE HYDROCHLORIDE 0.5 MG: 1 INJECTION, SOLUTION INTRAMUSCULAR; INTRAVENOUS; SUBCUTANEOUS at 22:32

## 2021-09-13 RX ADMIN — PIPERACILLIN SODIUM AND TAZOBACTAM SODIUM 3.38 G: 3; .375 INJECTION, POWDER, LYOPHILIZED, FOR SOLUTION INTRAVENOUS at 11:23

## 2021-09-13 RX ADMIN — OXYCODONE HYDROCHLORIDE 5 MG: 5 TABLET ORAL at 02:40

## 2021-09-13 RX ADMIN — PIPERACILLIN SODIUM AND TAZOBACTAM SODIUM 3.38 G: 3; .375 INJECTION, POWDER, LYOPHILIZED, FOR SOLUTION INTRAVENOUS at 06:36

## 2021-09-13 RX ADMIN — OXYCODONE HYDROCHLORIDE 5 MG: 5 TABLET ORAL at 09:29

## 2021-09-13 RX ADMIN — VANCOMYCIN HYDROCHLORIDE 1000 MG: 1 INJECTION, SOLUTION INTRAVENOUS at 08:34

## 2021-09-13 RX ADMIN — PIPERACILLIN SODIUM AND TAZOBACTAM SODIUM 3.38 G: 3; .375 INJECTION, POWDER, LYOPHILIZED, FOR SOLUTION INTRAVENOUS at 00:45

## 2021-09-13 RX ADMIN — HYDROMORPHONE HYDROCHLORIDE 0.5 MG: 1 INJECTION, SOLUTION INTRAMUSCULAR; INTRAVENOUS; SUBCUTANEOUS at 11:23

## 2021-09-13 RX ADMIN — HYDROMORPHONE HYDROCHLORIDE 0.5 MG: 1 INJECTION, SOLUTION INTRAMUSCULAR; INTRAVENOUS; SUBCUTANEOUS at 18:46

## 2021-09-13 RX ADMIN — ACETAMINOPHEN 650 MG: 325 TABLET, FILM COATED ORAL at 04:43

## 2021-09-13 RX ADMIN — INSULIN ASPART 8 UNITS: 100 INJECTION, SOLUTION INTRAVENOUS; SUBCUTANEOUS at 08:34

## 2021-09-13 RX ADMIN — ACETAMINOPHEN 650 MG: 325 TABLET, FILM COATED ORAL at 18:55

## 2021-09-13 RX ADMIN — CLONIDINE HYDROCHLORIDE 0.1 MG: 0.1 TABLET ORAL at 06:36

## 2021-09-13 RX ADMIN — HYDROMORPHONE HYDROCHLORIDE 0.5 MG: 1 INJECTION, SOLUTION INTRAMUSCULAR; INTRAVENOUS; SUBCUTANEOUS at 03:58

## 2021-09-13 RX ADMIN — SODIUM CHLORIDE, PRESERVATIVE FREE: 5 INJECTION INTRAVENOUS at 12:53

## 2021-09-13 RX ADMIN — PIPERACILLIN SODIUM AND TAZOBACTAM SODIUM 3.38 G: 3; .375 INJECTION, POWDER, LYOPHILIZED, FOR SOLUTION INTRAVENOUS at 23:45

## 2021-09-13 RX ADMIN — VANCOMYCIN HYDROCHLORIDE 1000 MG: 1 INJECTION, SOLUTION INTRAVENOUS at 20:33

## 2021-09-13 RX ADMIN — PIPERACILLIN SODIUM AND TAZOBACTAM SODIUM 3.38 G: 3; .375 INJECTION, POWDER, LYOPHILIZED, FOR SOLUTION INTRAVENOUS at 17:28

## 2021-09-13 ASSESSMENT — ACTIVITIES OF DAILY LIVING (ADL)
ADLS_ACUITY_SCORE: 15
ADLS_ACUITY_SCORE: 17

## 2021-09-13 NOTE — H&P
[facility]    History and Physical - Hospitalist Service       Date of Admission:  9/12/2021    Assessment & Plan   Chris Shea is a 50 year old male with hx of poorly-controlled DM2, chronic right foot ulcer, and methamphetamine abuse who was admitted on 9/12/2021 for sepsis due to a large neck abscess    Large neck abscess with cellulitis  Presented with 2 day history of progressive neck pain, redness, and swelling with purulent drainage after picking at his skin. On arrival, he was noted to be tachycardic with leukocytosis to 16.4k. In the ED, he was initiated on IV vancomycin and pip-tazo  - Continues on vancomycin and pip-tazo  - CT neck pending  - General Surgery has been consulted for I&D  - ID consult requested  - 9/12 blood cultures x2 pending    Chronic right mid-foot ulcer  2 cm ulcer over plantar aspect of right mid-foot. States he follows at wound clinic through [facility].   - WOCN consult  - Podiatry consult    Methamphetamine abuse  Admits to using methamphetamine twice weekly. Last used: 9/10. Denies IVDU  - Patient is diaphoretic, likely in early withdrawal  - Start clonidine 0.1 mg q8h, diazepam 5-10 mg IV q4h prn for anxiety/agitation  - Psychiatry consult requested to assist with management of withdrawal    DM2 with peripheral vascular disease, uncontrolled  BG>400 on arrival. 6/24/21 A1c 13.8. PTA on metformin 1000 mg BID  - Hold metformin  - Start Lantus 20 units qhs  - High SSI available    Diet: carb-controlled diet, NS at 100 ml/h  DVT Prophylaxis: Pneumatic Compression Devices  Tam Catheter: Not present  Code Status:   Full Code       Disposition: Expected discharge pending I&D of abscess, improvement in cellulitis, and resolution of meth withdrawal, 2-3+ days. Will need to clarify patient's living situation; per chart review, he is homeless, but patient is inconsistent when asked where he lives    Nichole Ibarra MD  [facility]  [facility]  Contact information available via [facility] Paging/Directory      ______________________________________________________________________    Chief Complaint   Neck wound    History is obtained from the patient, discussion with ED staff, and review of medical records    History of Present Illness   Chris Shea is a 50 year old male with hx of poorly-controlled DM2, chronic right foot ulcer, and methamphetamine abuse who presents for evaluation of a large fluctuant neck mass. The patient is a poor historian; history is further limited by drowsiness with patient frequently nodding off during our encounter. The patient reports that he has been picking at pimples on the back of his neck, and then 2 days ago, he noticed uncomfortable neck swelling that has worsened and become increasingly painful. He endorses purulent drainage. He endorses subjective fevers and chills. He otherwise reports that he uses methamphetamine twice weekly (last used 2 days PTA), but denies IVDU.     In the ED, he was tachycardic with leukocytosis to 16.4k. CT neck is pending at this time. General Surgery was notified, and their evaluation is pending at this time. In the ED, he was empirically initiated on IV vancomycin and pip-tazo for sepsis due to neck abscess    Review of Systems    10 point Review of Systems was reviewed and pertinent positives and negatives are noted in the HPI    Past Medical History    I have reviewed this patient's medical history and updated it with pertinent information if needed.   Past Medical History:   Diagnosis Date     Methamphetamine abuse (H)      Right foot ulcer (H)      Type 2 diabetes mellitus with diabetic peripheral angiopathy without gangrene, without long-term current use of insulin (H)        Past Surgical History   I have reviewed this patient's surgical history and updated it with pertinent information if needed.  No past surgical history on file.    Social History   He denies  history of smoking or alcohol use. He admits to meth use as noted in HPI. Per chart review, it appears that he may be homeless; the patient is unable to tell me where he lives.     Family History   Patient was unable to tell me about his family history. There is no family history on file    Prior to Admission Medications   Prior to Admission Medications   Prescriptions Last Dose Informant Patient Reported? Taking?   FLEXERIL 10 MG OR TABS   Yes No   Si TABLET 3 TIMES DAILY AS NEEDED   IBUPROFEN 200 MG OR CAPS   Yes No   Si CAPSULE EVERY 4 TO 6 HOURS AS NEEDED      Facility-Administered Medications: None     Allergies   No Known Allergies    Physical Exam   Vital Signs: Temp: 98.4  F (36.9  C) Temp src: Oral BP: 129/79 Pulse: 105   Resp: 16 SpO2: 98 %      Weight: 250 lbs 0 oz    Constitutional: Resting in bed, diaphoretic  HEENT: Edentulous, MMM  Respiratory: Breathing non-labored. Lungs CTAB - no wheezes/crackles/rhonchi  Cardiovascular: Heart RRR, no m/r/g. 1+ BLE edema  GI: +BS. Abd soft/NT  Lymph/Hematologic: No cervical LAD  Genitourinary: Not examined  Skin/Integument: Large, fluctuant mass over posterior neck with purulent discharge and surround erythema. Warm to touch. 2 cm deep ulcer over plantar aspect of right mid-foot  Musculoskeletal: Normal muscle bulk and tone  Neurologic: Drowsy, rouses to voice and interacts appropriately. Unable to stay awake long enough to participate in meaningful conversation. CONROY  Psychiatric: Calm and cooperative     Data   Data reviewed today: I reviewed all medications, new labs and imaging results over the last 24 hours. I personally reviewed the EKG tracing showing sinus tachycardia.    Recent Labs   Lab 21  1820   WBC 16.4*   HGB 13.2*   MCV 83         POTASSIUM 4.3   CHLORIDE 97   CO2 26   BUN 17   CR 1.24   ANIONGAP 10   ELINOR 9.1   *   ALBUMIN 2.6*   PROTTOTAL 7.9   BILITOTAL 0.6   ALKPHOS 178*   ALT 13   AST 7         No results found  for this or any previous visit (from the past 24 hour(s)).

## 2021-09-13 NOTE — CONSULTS
[facility]    Infectious Disease Consultation     Date of Admission:  9/12/2021  Date of Consult (When I saw the patient): 09/13/21    Assessment & Plan   Chris Shea is a 50 year old male who was admitted on 9/12/2021.     Impression:  1. 50 y.o male with diabetes.   2. Methamphetamine use.   3. Chronic right foot ulcer.   4. Admitted on 9/12/2021 for sepsis due to a large neck abscess  5. On vancomycin and pip-tazo  6. General Surgery has been consulted for I&D -patient wished to hold off for today and so he will be n.p.o. tonight with likely OR tomorrow 9/14  5. Pending blood cultures.       Recommendations:   Copious drainage from the neck abscess, discussed with patient not to delay surgery.   For now continue on broad spectrum antibiotics.   Follow up on the pending cultures          Lexis Miller MD    Reason for Consult   Reason for consult: I was asked to evaluate this patient for neck abscess .    Primary Care Physician   Sang Edmondson    Chief Complaint   Neck wound     History is obtained from the patient and medical records    History of Present Illness   Chris Shea is a 50 year old male who presents with neck wound.  The patient reports that he was picking at some skin lesions on the back of his neck recently.  Approximately 2 days ago, the patient noticed swelling of the posterior neck.  He has had progressive pain associated with the swelling.  He has also been having drainage from the posterior aspect of his neck.  He believes he may have been having some associated fevers and chills    Past Medical History   I have reviewed this patient's medical history and updated it with pertinent information if needed.   Past Medical History:   Diagnosis Date     Methamphetamine abuse (H)      Right foot ulcer (H)      Type 2 diabetes mellitus with diabetic peripheral angiopathy without gangrene, without long-term current use of insulin (H)        Past Surgical History   I have  reviewed this patient's surgical history and updated it with pertinent information if needed.  No past surgical history on file.    Prior to Admission Medications   Prior to Admission Medications   Prescriptions Last Dose Informant Patient Reported? Taking?   ibuprofen (ADVIL/MOTRIN) 200 MG tablet   Yes No   Sig: Take 800 mg by mouth 2 times daily    metFORMIN (GLUCOPHAGE) 500 MG tablet 9/12/2021 at am  Yes Yes   Sig: Take 1,000 mg by mouth 2 times daily      Facility-Administered Medications: None     Allergies   No Known Allergies    Immunization History   Immunization History   Administered Date(s) Administered     Tdap (Adacel,Boostrix) 09/12/2021       Social History   I have reviewed this patient's social history and updated it with pertinent information if needed. Chris Shea  reports that he has never smoked. He does not have any smokeless tobacco history on file. He reports that he does not drink alcohol and does not use drugs.    Family History   I have reviewed this patient's family history and updated it with pertinent information if needed.   No family history on file.    Review of Systems   The 10 point Review of Systems is negative other than noted in the HPI or here.     Physical Exam   Temp: 98.7  F (37.1  C) Temp src: Oral BP: 118/70 Pulse: 104   Resp: 16 SpO2: 97 % O2 Device: None (Room air)    Vital Signs with Ranges  Temp:  [97.5  F (36.4  C)-99.1  F (37.3  C)] 98.7  F (37.1  C)  Pulse:  [] 104  Resp:  [13-20] 16  BP: (114-129)/(68-80) 118/70  SpO2:  [96 %-99 %] 97 %  250 lbs 0 oz  Body mass index is 32.1 kg/m .    GENERAL APPEARANCE:  awake  EYES: Eyes grossly normal to inspection, PERRL and conjunctivae and sclerae normal  HENT: ear canals and TM's normal and nose and mouth without ulcers or lesions  NECK: large draining indurated area on the posterior neck   RESP: lungs clear to auscultation - no rales, rhonchi or wheezes  CV: regular rates and rhythm, normal S1 S2, no S3 or S4 and  no murmur, click or rub  LYMPHATICS: normal ant/post cervical and supraclavicular nodes  ABDOMEN: soft, nontender, without hepatosplenomegaly or masses and bowel sounds normal  MS: extremities normal- no gross deformities noted  SKIN: no suspicious lesions or rashes      Data   Lab Results   Component Value Date    WBC 17.9 (H) 09/13/2021    HGB 12.2 (L) 09/13/2021    HCT 36.6 (L) 09/13/2021     09/13/2021     09/13/2021    POTASSIUM 4.2 09/13/2021    CHLORIDE 102 09/13/2021    CO2 24 09/13/2021    BUN 15 09/13/2021    CR 1.15 09/13/2021     (H) 09/13/2021    AST 7 09/12/2021    ALT 13 09/12/2021    ALKPHOS 178 (H) 09/12/2021    BILITOTAL 0.6 09/12/2021     No results for input(s): CULT in the last 168 hours.  No lab results found.    Invalid input(s): UC

## 2021-09-13 NOTE — CONSULTS
"[facility]  WO Nurse Inpatient Wound Assessment     Reason for consultation: Evaluate and treat \"chronic diabetic foot ulcer.\"      Charting reviewed. Spoke to primary RN in person. Per charting and primary RN,   \"Surgery planning for incision and drainage of posterior neck infection\" and  \"will plan for bedside excisional debridement of right foot ulcer tomorrow \" per \"[place] PODIATRY/FOOT & ANKLE SURGERY CONSULTATION NOTE\".     WOC team will sign off at this time, defer cares and treatment planning to podiatry/foot and ankle surgery team.     Please reconsult as needed.       Kathi PAEZ         "

## 2021-09-13 NOTE — ED NOTES
"[facility]  ED Nurse Handoff Report    ED Chief complaint: Wound Check      ED Diagnosis:   Final diagnoses:   Acute sepsis (H)   Cellulitis and abscess of neck   Ulcer of right foot, unspecified ulcer stage (H)       Code Status: Full Code    Allergies: No Known Allergies    Patient Story: Patient reports a draining wound from the back of his head for the last few days.   Focused Assessment:    Neuro: WDL   Cards: WDL   Pulm: WDL   SKin: Large wound on head/neck see photo in MD note     Treatments and/or interventions provided: abx  Patient's response to treatments and/or interventions:     To be done/followed up on inpatient unit:      Does this patient have any cognitive concerns?: none    Activity level - Baseline/Home:  Independent  Activity Level - Current:   Independent    Patient's Preferred language: English   Needed?: No    Isolation: None  Infection: Not Applicable  Patient tested for COVID 19 prior to admission: YES  Bariatric?: No    Vital Signs:   Vitals:    09/12/21 1817 09/12/21 1830   BP: 114/74 129/79   Pulse: 103 105   Resp: 17 16   Temp: 98.4  F (36.9  C)    TempSrc: Oral    SpO2: 96% 98%   Weight: 113.4 kg (250 lb)    Height: 1.88 m (6' 2\")        Cardiac Rhythm:Cardiac Rhythm: Sinus tachycardia    Was the PSS-3 completed:   Yes  What interventions are required if any?               Family Comments: none  OBS brochure/video discussed/provided to patient/family: N/A              Name of person given brochure if not patient:               Relationship to patient:     For the majority of the shift this patient's behavior was Green.   Behavioral interventions performed were .    ED NURSE PHONE NUMBER: [phone]         "

## 2021-09-13 NOTE — PROGRESS NOTES
RECEIVING UNIT ED HANDOFF REVIEW    ED Nurse Handoff Report was reviewed by: Bijal Sprague RN on September 12, 2021 at 9:41 PM

## 2021-09-13 NOTE — PLAN OF CARE
DATE & TIME: 09/13/2021 [id number]   Cognitive Concerns/ Orientation : A/Ox4; forgetful.    BEHAVIOR & AGGRESSION TOOL COLOR: Green; frustrated and restless at times  CIWA SCORE: n/a   ABNL VS/O2: VSS on RA  MOBILITY: SBA  PAIN MANAGMENT:  neck pain due to wound with abscess; managed with IV dilaudid, PO oxycodone,   DIET: Low carb, poor appetite  BOWEL/BLADDER: Continent. No BM this shift  ABNL LAB/BG: , 289 328 WBC 17.9;   DRAIN/DEVICES: PIV infusing with IVF @100 ml/hr. on IV Zosyn and  Vanco.   TELEMETRY RHYTHM: NSR  SKIN: large cellulitis to neck with abscess-erythema boarders are marked, swelling noted, warm to touch, drainage purulent to pink. R foot ulcer, no drainage. Rolando LE neuropathy baseline. Applied new dressing to neck, but patient removed per self  TESTS/PROCEDURES: plan for  I&D of neck abscess and right foot  wound for tomorrow,  npo after MN,right foot x ray completed  D/C DATE: seen by Podiatry, Surgery, Psych

## 2021-09-13 NOTE — PLAN OF CARE
DATE & TIME: 09/13/2021 6 AM    Cognitive Concerns/ Orientation : A/Ox4; forgetful. Angry and frustrated at times when in pain.  Explained the scheduled for pain medications; emotional support provided.   BEHAVIOR & AGGRESSION TOOL COLOR: Green; frustrated and restless at times  CIWA SCORE: n/a   ABNL VS/O2: VSS on RA  MOBILITY: SBA  PAIN MANAGMENT: severe neck pain due to wound with abscess; managed with IV dilaudid, PO oxycodone, PO tylenol.   DIET: NPO at 3 AM for potential I&D  BOWEL/BLADDER: Continent. No BM this shift  ABNL LAB/BG: /287, on call MD called; additional dose of Aspart given 5 units x ONCE. WBC 16.4; Hg 13.2. A1C 13.3  DRAIN/DEVICES: PIV infusing with IVF @100 ml/hr. Intermittently IV antibiotics were given. Pt is on IV Zosyn and IV Vanco.   TELEMETRY RHYTHM: NSR  SKIN: large cellulitis to neck with abscess-erythema boarders are marked, swelling noted, warm to touch, drainage purulent to pink. R foot ulcer, no drainage. Rolando LE neuropathy baseline. Attempted dressings to neck and foot; pt took them off.   TESTS/PROCEDURES: possible I&D  D/C DATE: Pending following consults Podiatry, Surgery, WOC, Psych today. Plan for I&D  Discharge Barriers: neck abscess; pain; consults not completed; diabetes not managed well at home.    OTHER IMPORTANT INFO: Hs of methamphetamine abuse  MD/RN ROUNDING SIGNED OFF D/E SHIFT: n/a

## 2021-09-13 NOTE — PHARMACY-VANCOMYCIN DOSING SERVICE
"Pharmacy Vancomycin Initial Note  Date of Service 2021  Patient's  1970  50 year old, male    Indication: Abscess    Current estimated CrCl = Estimated Creatinine Clearance: 93.3 mL/min (based on SCr of 1.24 mg/dL).    Creatinine for last 3 days  2021:  6:20 PM Creatinine 1.24 mg/dL    Recent Vancomycin Level(s) for last 3 days  No results found for requested labs within last 72 hours.      Vancomycin IV Administrations (past 72 hours)                   vancomycin 2500 mg in 0.9% NaCl 500 ml intermittent infusion 2,500 mg (mg) 2,500 mg New Bag 21                Nephrotoxins and other renal medications (From now, onward)    Start     Dose/Rate Route Frequency Ordered Stop    21 0800  vancomycin (VANCOCIN) 1000 mg in dextrose 5% 200 mL PREMIX      1,000 mg  200 mL/hr over 1 Hours Intravenous EVERY 12 HOURS 21 2236      21 0000  piperacillin-tazobactam (ZOSYN) 3.375 g vial to attach to  mL bag     Note to Pharmacy: For [facility], [facility] and [facility]: For Zosyn-naive patients, use the \"Zosyn initial dose + extended infusion\" order panel.    3.375 g  over 30 Minutes Intravenous EVERY 6 HOURS 21 2224            Contrast Orders - past 72 hours (72h ago, onward)    Start     Dose/Rate Route Frequency Ordered Stop    21 1925  iopamidol (ISOVUE-370) solution 80 mL      80 mL Intravenous ONCE 21 19221 193          Loading dose: 2500 mg IV given 21 1950.  Regimen: 1000 mg IV every 12 hours.  Start time: 07:50 on 2021  Exposure target: AUC24 (range)400-600 mg/L.hr   AUC24,ss: 468 mg/L.hr  Probability of AUC24 > 400: 67 %  Ctrough,ss: 15.6 mg/L  Probability of Ctrough,ss > 20: 28 %  Probability of nephrotoxicity (Lodise MARKO ): 11 %        Plan:  1. Start vancomycin  1000 mg IV q12h.   2. Vancomycin monitoring method: AUC  3. Vancomycin therapeutic monitoring goal: 400-600 mg*h/L  4. Pharmacy will check vancomycin levels as appropriate in " 1-3 Days.    5. Serum creatinine levels will be ordered daily for the first week of therapy and at least twice weekly for subsequent weeks.      Inna Knight RPH

## 2021-09-13 NOTE — CONSULTS
[place] [facility]  CONSULTATION NOTE    ASSESSMENT:  50-year-old male with hx of poorly-controlled DM2, chronic right foot ulcer, and methamphetamine abuse who was admitted on 9/12/2021 for sepsis due to a large neck abscess.    The foot ulcer is stable without clinical signs of infection.  It could use some excisional debridement    PLAN:   Surgery planning for incision and drainage of posterior neck infection. Zosyn and vancomycin.    I will plan for bedside excisional debridement of right foot ulcer tomorrow or when he is available.  Will place a miscellaneous supply order for a #15 scalpel and other materials.     Plain films of the right foot ordered.    Inpatient Orthotics to evaluate for an offloading shoe or boot.      Thank you for the consultation request and the opportunity to participate in this patient's care.       Deepak Whitley DPM, FACUNA, MS    [place] [facility] of Pod[facility]/Foot & Ankle Surgery    Pager:  [phone]      _______________________________________________________________________    CHIEF COMPLAINT:      I was asked by Nichole Ibarra MD to evaluate this patient for a chronic right foot ulcer. .    PATIENT HISTORY:  Chris Shea is a 50-year-old male with hx of poorly-controlled DM2, chronic right foot ulcer, and methamphetamine abuse who was admitted on 9/12/2021 for sepsis due to a large neck abscess.    Podiatry is consulted for the a chronic right foot ulcer. Chris reports that the wound has existed for over a year, yet he describes a history of other ulcerations, bilateral foot. He reports a hospitalization for treatment of related infection in 2014 and a surgery in 2016 that involved removal of bone.    He has not used diabetic shoes and orthoses, not having insurance coverage. Prior wound treatment at [facility] and more recently [facility].     He reports self cares involving soaking his foot in a solution of H2O2, isopropyl alcohol and witch  kirsty.    Review of Systems:  A 10 point review of systems was performed and is positive for that noted above in the patient history.  All other areas are negative.     PAST MEDICAL HISTORY:   Past Medical History:   Diagnosis Date     Methamphetamine abuse (H)      Right foot ulcer (H)      Type 2 diabetes mellitus with diabetic peripheral angiopathy without gangrene, without long-term current use of insulin (H)         PAST SURGICAL HISTORY: No past surgical history on file.     MEDICATIONS:  Reviewed in Epic.     ALLERGIES:  No Known Allergies     SOCIAL HISTORY:   Social History     Socioeconomic History     Marital status: Single     Spouse name: Not on file     Number of children: Not on file     Years of education: Not on file     Highest education level: Not on file   Occupational History     Not on file   Tobacco Use     Smoking status: Never Smoker   Substance and Sexual Activity     Alcohol use: No     Drug use: No     Sexual activity: Never   Other Topics Concern     Parent/sibling w/ CABG, MI or angioplasty before 65F 55M? Not Asked   Social History Narrative     Not on file     Social Determinants of Health     Financial Resource Strain:      Difficulty of Paying Living Expenses:    Food Insecurity:      Worried About Running Out of Food in the Last Year:      Ran Out of Food in the Last Year:    Transportation Needs:      Lack of Transportation (Medical):      Lack of Transportation (Non-Medical):    Physical Activity:      Days of Exercise per Week:      Minutes of Exercise per Session:    Stress:      Feeling of Stress :    Social Connections:      Frequency of Communication with Friends and Family:      Frequency of Social Gatherings with Friends and Family:      Attends [employer] Services:      Active Member of Clubs or Organizations:      Attends Club or Organization Meetings:      Marital Status:    Intimate Partner Violence:      Fear of Current or Ex-Partner:      Emotionally Abused:       "Physically Abused:      Sexually Abused:         FAMILY HISTORY: No family history on file.     EXAM:Vitals: /70 (BP Location: Left arm)   Pulse 104   Temp 98.7  F (37.1  C) (Oral)   Resp 16   Ht 1.88 m (6' 2\")   Wt 113.4 kg (250 lb)   SpO2 97%   BMI 32.10 kg/m    BMI= Body mass index is 32.1 kg/m .    LABS:     Lab Results   Component Value Date    WBC 17.9 09/13/2021     Lab Results   Component Value Date    RBC 4.47 09/13/2021     Lab Results   Component Value Date    HGB 12.2 09/13/2021     Lab Results   Component Value Date    HCT 36.6 09/13/2021     No components found for: MCT  Lab Results   Component Value Date    MCV 82 09/13/2021     Lab Results   Component Value Date    MCH 27.3 09/13/2021     Lab Results   Component Value Date    MCHC 33.3 09/13/2021     Lab Results   Component Value Date    RDW 12.4 09/13/2021     Lab Results   Component Value Date     09/13/2021       Recent Labs   Lab Test 09/13/21  1253 09/13/21  0917 09/12/21  1820   NA  --  135 133   POTASSIUM  --  4.2 4.3   CHLORIDE  --  102 97   CO2  --  24 26   ANIONGAP  --  9 10   * 314* 430*   BUN  --  15 17   CR  --  1.15 1.24   ELINOR  --  8.5 9.1       General appearance: Patient is alert and fully cooperative with history & exam.  No sign of distress is noted during the visit.      Psychiatric: Affect is pleasant & appropriate.  Patient appears motivated to improve health.       Respiratory: Breathing is regular & unlabored while sitting.      HEENT: Hearing is intact to spoken word.  Speech is clear.  No gross evidence of visual impairment that would impact ambulation.       Vascular: DP & PT pulses are intact & regular bilaterally.  No significant lower extremity edema or varicosities noted.  CFT and skin temperature is normal to both lower extremities.       Neurologic: Lower extremity sensation is grossly diminished, bilateral foot, to light touch.  No evidence of weakness or contracture in the lower " extremities.       Musculoskeletal: Patient is ambulatory without assistive device or brace.  No gross foot or ankle deformity noted.      Dermatologic:  0.8cm diameter x 0.4cm deep ulceration sub right first metatarsal head.  It does not probe to bone. Thick hyperkeratotic eschar around the margins with some undermining.    No associated edema, erythema, malodor, or purulence.

## 2021-09-13 NOTE — PROGRESS NOTES
[facility]    Medicine Progress Note - Hospitalist Service       Date of Admission:  9/12/2021    Assessment & Plan         Chris Shea is a 50 year old male with hx of poorly-controlled DM2, chronic right foot ulcer, and methamphetamine abuse who was admitted on 9/12/2021 for sepsis due to a large neck abscess     Posterior neck cellulitis  Presented with 2 day history of progressive neck pain, redness, and swelling with purulent drainage after picking at his skin. On arrival, he was noted to be tachycardic with leukocytosis to 16.4k. In the ED, he was initiated on IV vancomycin and pip-tazo  - Continues on vancomycin and pip-tazo  - CT neck pending  - General Surgery has been consulted for I&D -patient wished to hold off for today and so he will be n.p.o. tonight with likely OR tomorrow 9/14  - ID consult requested  - 9/12 blood cultures x2 pending     Chronic right mid-foot ulcer  2 cm ulcer over plantar aspect of right mid-foot. States he follows at wound clinic through [facility]   - WOCN consult  - Podiatry consult - planning bedside excisional debridement of R foot ulcer 9/14; plain films ordered.     Methamphetamine abuse  Admits to using methamphetamine twice weekly. Last used: 9/10. Denies IVDU  - Patient is diaphoretic, likely in early withdrawal  - Psychiatry consulted-they feel he is not withdrawing and has not used frequently.  As needed hydroxyzine and stopping any clonidine or benzodiazepine was recommended.     DM2 with peripheral vascular disease, uncontrolled  BG>400 on arrival. 6/24/21 A1c 13.8. PTA on metformin 1000 mg BID  - Hold metformin  - Start Lantus 20 units qhs  - High SSI available  - will likely need adjustment      Diet: NPO per Anesthesia Guidelines for Procedure/Surgery Except for: Meds    DVT Prophylaxis: Pneumatic Compression Devices  Tam Catheter: Not present  Central Lines: None  Code Status: Full Code      Disposition Plan   Expected  discharge: 09/16/2021   recommended to unclear - will need to further evaluate after I&D and see his needs.     The patient's care was discussed with the Bedside Nurse and Patient.    Uriel Horton MD  Hospitalist Service  [facility]  Securely message with the Vocera Web Console (learn more here)  Text page via [facility] Paging/Directory      Clinically Significant Risk Factors Present on Admission                   ______________________________________________________________________    Interval History   Care assumed today.  Patient seen and examined midday.  He reports being tired but otherwise denies fevers, chills, shortness of breath or new pain.  General surgery offered I&D today which he wishes to hold off, try antibiotics, and ultimately go to the OR tomorrow 9/14 if necessary.  Podiatry planning R foot debridement for tomorrow.    Data reviewed today: I reviewed all medications, new labs and imaging results over the last 24 hours. I personally reviewed no images or EKG's today.    Physical Exam   Vital Signs: Temp: 99.1  F (37.3  C) Temp src: Oral BP: 120/76 Pulse: 115   Resp: 16 SpO2: 97 % O2 Device: None (Room air)    Weight: 250 lbs 0 oz    Gen: NAD, pleasant  HEENT: Normocephalic, EOMI, MMM; erythema, edema, and wound noted on back of head in hair - currently no active drainage visualized  Resp: no crackles,  no wheezes, no increased work of resp  CV: S1S2 heard, reg rhythm, reg rate, no pedal edema  Abdo: soft, nontender, nondistended, bowel sounds present  Ext: calves nontender, well perfused  Neuro: AAOx3, CN grossly intact, no facial asymmetry      Data   Recent Labs   Lab 09/13/21  1253 09/13/21  0917 09/13/21  0806 09/12/21  1820   WBC  --  17.9*  --  16.4*   HGB  --  12.2*  --  13.2*   MCV  --  82  --  83   PLT  --  286  --  322   NA  --  135  --  133   POTASSIUM  --  4.2  --  4.3   CHLORIDE  --  102  --  97   CO2  --  24  --  26   BUN  --  15  --  17   CR  --  1.15   --  1.24   ANIONGAP  --  9  --  10   ELINOR  --  8.5  --  9.1   * 314* 317* 430*   ALBUMIN  --   --   --  2.6*   PROTTOTAL  --   --   --  7.9   BILITOTAL  --   --   --  0.6   ALKPHOS  --   --   --  178*   ALT  --   --   --  13   AST  --   --   --  7     Recent Results (from the past 24 hour(s))   Soft tissue neck CT w contrast    Narrative    CT SCAN OF THE NECK WITH CONTRAST  9/12/2021 7:45 PM     HISTORY: Neck abscess, deep tissue. Infection to back of neck.    TECHNIQUE: Axial CT images of the neck following administration of  intravenous contrast with reformations. Radiation dose for this scan  was reduced using automated exposure control, adjustment of the mA  and/or kV according to patient size, or iterative reconstruction  technique. 80mL Isovue-370 IV.     COMPARISON: None.     FINDINGS: Swelling with extensive edema and soft tissue induration  noted centered in the posterior occipital, suboccipital and midline  posterior cervical regions. This extends from about the external  occipital protuberance superiorly and inferiorly to the soft tissues  at the C5 level. Cutaneous and subcutaneous induration noted and there  is swelling and edema involving the posterior paraspinous musculature  and musculature extending to the occipital scalp. Soft tissue swelling  extends laterally and anteriorly adjacent to the mid and upper  portions of the sternocleidomastoid muscles. No evidence for discrete,  drainable fluid collection or radiopaque foreign body. No gas bubbles.  No evidence for extension into the deep cervical soft tissues or  interspinous involvement. Thickening of the trapezius and ligamentum  nuchae structures noted.  Muscular edema versus myositis possible.  There is no evidence for intramuscular fluid collection or mass.    Visualized sinuses, nasopharynx and orbits: Unremarkable.     Tongue, oral cavity and oropharynx:  Unremarkable.     Hypopharynx: Unremarkable.     Larynx and trachea:  Unremarkable.     Thyroid: No abnormal thyroid gland nodules.     Submandibular glands: Unremarkable.     Parotid glands: Unremarkable.       Lymph nodes: Scattered reactive nodes noted in the jugular chains and  posterior triangles. No evidence for cystic or necrotic adenopathy.     Vasculature: Unremarkable.     Upper mediastinum and lungs: Unremarkable.     Bones: Unremarkable.     Extensive mandibular and maxillary dental caries and periodontal  disease noted.      Impression    IMPRESSION:    Ill-defined, moderately extensive inflammatory process involving the  posterior upper cervical and occipitocervical soft tissues. No  evidence for discrete drainable abscess, bone involvement or deep  intraspinal extension. Mild muscular swelling and interspersed edema  noted. Probable extension of presumed cellulitis into the spaces,  however myositis remains a possibility. No evidence for discrete,  intramuscular masses or fluid collections.      HAY JONES MD         SYSTEM ID:  [id number]

## 2021-09-13 NOTE — CONSULTS
[facility] General Surgery Consultation    Chris Shea MRN# [id number]   YOB: 1970 Age: 50 year old      Date of Admission:  9/12/2021  Date of Consult: 9/13/2021         Assessment and Plan:   Patient is a 50 year old male with posterior neck infection     PLAN:  -Medical management per primary team  -Broad-spectrum IV antibiotics  -Pain control as needed  -I have recommended proceeding to the operating room this morning for incision and drainage of the patient's posterior neck infection.  Risks and benefits of the procedure were discussed.  The patient reports that he would like to let the antibiotics work for another day prior to surgical intervention.  He is not willing to go to the operating room today.  I will place the patient n.p.o. after midnight and schedule the patient for incision and drainage of posterior neck infection tomorrow morning.  If podiatry is planning surgical intervention for the patient's foot ulcer, this could be completed as a combo procedure.  Patient reports he is in agreement with this plan.         Requesting Physician:                 Chief Complaint:     Chief Complaint   Patient presents with     Wound Check          History of Present Illness:   Chris Shea is a 50 year old male who was seen in consultation at the request of  who presented with neck wound.  The patient reports that he was picking at some skin lesions on the back of his neck recently.  Approximately 2 days ago, the patient noticed swelling of the posterior neck.  He has had progressive pain associated with the swelling.  He has also been having drainage from the posterior aspect of his neck.  He believes he may have been having some associated fevers and chills.  He denies history of previous similar symptoms.          Physical Exam:   Blood pressure 120/76, pulse 115, temperature 99.1  F (37.3  C), temperature source Oral, resp. rate 16, height 1.88  "m (6' 2\"), weight 113.4 kg (250 lb), SpO2 97 %.  250 lbs 0 oz  General: Vital signs reviewed, in no apparent distress  Eyes: Anicteric  HENT: Normocephalic, atraumatic, trachea midline   Respiratory: Breathing nonlabored  Cardiovascular: Regular rhythm, tachycardic  Musculoskeletal: No gross deformities  Neurologic: Grossly nonfocal exam  Psychiatric: Normal mood, affect and insight  Integumentary: Significant erythema and induration of the posterior neck with open wound draining purulent material          Past Medical History:     Past Medical History:   Diagnosis Date     Methamphetamine abuse (H)      Right foot ulcer (H)      Type 2 diabetes mellitus with diabetic peripheral angiopathy without gangrene, without long-term current use of insulin (H)             Past Surgical History:   No past surgical history on file.         Current Medications:           insulin aspart  1-10 Units Subcutaneous TID AC     insulin aspart  1-7 Units Subcutaneous At Bedtime     insulin glargine  20 Units Subcutaneous QPM     piperacillin-tazobactam  3.375 g Intravenous Q6H     sodium chloride (PF)  3 mL Intracatheter Q8H     vancomycin  1,000 mg Intravenous Q12H       acetaminophen **OR** acetaminophen, glucose **OR** dextrose **OR** glucagon, HYDROmorphone, hydrOXYzine, lidocaine 4%, lidocaine (buffered or not buffered), melatonin, naloxone **OR** naloxone **OR** naloxone **OR** naloxone, ondansetron **OR** ondansetron, oxyCODONE, senna-docusate **OR** senna-docusate, sodium chloride (PF)         Home Medications:     Prior to Admission medications    Medication Sig Last Dose Taking? Auth Provider   metFORMIN (GLUCOPHAGE) 500 MG tablet Take 1,000 mg by mouth 2 times daily 9/12/2021 at am Yes Unknown, Entered By History   ibuprofen (ADVIL/MOTRIN) 200 MG tablet Take 800 mg by mouth 2 times daily    Reported, Patient            Allergies:   No Known Allergies         Family History:   mother with diabetes mellitus        Social " History:   Chris Shea  reports that he has never smoked. He does not have any smokeless tobacco history on file. He reports that he does not drink alcohol and does not use drugs.          Review of Systems:   Constitutional: Fevers and chills  Eyes: Blurred vision  HENT: Reports headaches, No rhinorrhea, No sore throat  Respiratory: shortness of breath  Cardiovascular: Denies chest pain or palpitations  Gastrointestinal: No abdominal pain or nausea/vomiting  Genitourinary: No hematuria or dysuria  Musculoskeletal: Lower extremity pain  Neurologic: No numbness or tingling  Integumentary: Foot ulcer         Labs/Imaging   All new lab and imaging data was reviewed.   Recent Labs   Lab 09/12/21  1820   WBC 16.4*   HGB 13.2*   HCT 39.6*   MCV 83        Recent Labs   Lab 09/13/21  0441 09/13/21  0214 09/12/21  2305 09/12/21  1820   NA  --   --   --  133   POTASSIUM  --   --   --  4.3   CHLORIDE  --   --   --  97   CO2  --   --   --  26   ANIONGAP  --   --   --  10   * 347* 386* 430*   BUN  --   --   --  17   CR  --   --   --  1.24   GFRESTIMATED  --   --   --  66   ELINOR  --   --   --  9.1   MAG  --   --   --  1.8   PROTTOTAL  --   --   --  7.9   ALBUMIN  --   --   --  2.6*   BILITOTAL  --   --   --  0.6   ALKPHOS  --   --   --  178*   AST  --   --   --  7   ALT  --   --   --  13       I have personally reviewed the imaging studies-   CT SCAN OF THE NECK WITH CONTRAST  9/12/2021 7:45 PM      FINDINGS: Swelling with extensive edema and soft tissue induration  noted centered in the posterior occipital, suboccipital and midline  posterior cervical regions. This extends from about the external  occipital protuberance superiorly and inferiorly to the soft tissues  at the C5 level. Cutaneous and subcutaneous induration noted and there  is swelling and edema involving the posterior paraspinous musculature  and musculature extending to the occipital scalp. Soft tissue swelling  extends laterally and anteriorly  adjacent to the mid and upper  portions of the sternocleidomastoid muscles. No evidence for discrete,  drainable fluid collection or radiopaque foreign body. No gas bubbles.  No evidence for extension into the deep cervical soft tissues or  interspinous involvement. Thickening of the trapezius and ligamentum  nuchae structures noted.  Muscular edema versus myositis possible.  There is no evidence for intramuscular fluid collection or mass.     Visualized sinuses, nasopharynx and orbits: Unremarkable.      Tongue, oral cavity and oropharynx:  Unremarkable.      Hypopharynx: Unremarkable.      Larynx and trachea: Unremarkable.      Thyroid: No abnormal thyroid gland nodules.      Submandibular glands: Unremarkable.      Parotid glands: Unremarkable.       Lymph nodes: Scattered reactive nodes noted in the jugular chains and  posterior triangles. No evidence for cystic or necrotic adenopathy.      Vasculature: Unremarkable.      Upper mediastinum and lungs: Unremarkable.      Bones: Unremarkable.      Extensive mandibular and maxillary dental caries and periodontal  disease noted.                                                                      IMPRESSION:    Ill-defined, moderately extensive inflammatory process involving the  posterior upper cervical and occipitocervical soft tissues. No  evidence for discrete drainable abscess, bone involvement or deep  intraspinal extension. Mild muscular swelling and interspersed edema  noted. Probable extension of presumed cellulitis into the spaces,  however myositis remains a possibility. No evidence for discrete,  intramuscular masses or fluid collections.      Lucina Dodson MD

## 2021-09-13 NOTE — PROGRESS NOTES
Patient arrived from ED to unit around 2220. Patient settled. Patient lethargic, arousal to voice. Patient disoriented to place. Denies pain. Posterior head edema, red and with scant serosanguinous drainage. Left foot wound, no drainage open to air. Wound nurse, psych, surgery and poediatry consult. Blood sugar 386, receiving insulin coverage.

## 2021-09-14 ENCOUNTER — APPOINTMENT (OUTPATIENT)
Dept: SURGERY | Facility: PHYSICIAN GROUP | Age: 51
End: 2021-09-14
Payer: MEDICAID

## 2021-09-14 LAB
ERYTHROCYTE [DISTWIDTH] IN BLOOD BY AUTOMATED COUNT: 12.4 % (ref 10–15)
GLUCOSE BLDC GLUCOMTR-MCNC: 184 MG/DL (ref 70–99)
GLUCOSE BLDC GLUCOMTR-MCNC: 189 MG/DL (ref 70–99)
GLUCOSE BLDC GLUCOMTR-MCNC: 212 MG/DL (ref 70–99)
GLUCOSE BLDC GLUCOMTR-MCNC: 214 MG/DL (ref 70–99)
GLUCOSE BLDC GLUCOMTR-MCNC: 260 MG/DL (ref 70–99)
GLUCOSE BLDC GLUCOMTR-MCNC: 267 MG/DL (ref 70–99)
HCT VFR BLD AUTO: 36.3 % (ref 40–53)
HGB BLD-MCNC: 12.2 G/DL (ref 13.3–17.7)
MCH RBC QN AUTO: 27.6 PG (ref 26.5–33)
MCHC RBC AUTO-ENTMCNC: 33.6 G/DL (ref 31.5–36.5)
MCV RBC AUTO: 82 FL (ref 78–100)
PLATELET # BLD AUTO: 289 10E3/UL (ref 150–450)
POTASSIUM BLD-SCNC: 3.8 MMOL/L (ref 3.4–5.3)
RBC # BLD AUTO: 4.42 10E6/UL (ref 4.4–5.9)
VANCOMYCIN SERPL-MCNC: 9.2 MG/L
WBC # BLD AUTO: 21.7 10E3/UL (ref 4–11)

## 2021-09-14 PROCEDURE — 258N000003 HC RX IP 258 OP 636: Performed by: INTERNAL MEDICINE

## 2021-09-14 PROCEDURE — 250N000011 HC RX IP 250 OP 636: Performed by: INTERNAL MEDICINE

## 2021-09-14 PROCEDURE — 87075 CULTR BACTERIA EXCEPT BLOOD: CPT | Performed by: SURGERY

## 2021-09-14 PROCEDURE — 250N000011 HC RX IP 250 OP 636: Performed by: NURSE ANESTHETIST, CERTIFIED REGISTERED

## 2021-09-14 PROCEDURE — 99233 SBSQ HOSP IP/OBS HIGH 50: CPT | Performed by: HOSPITALIST

## 2021-09-14 PROCEDURE — 11042 DBRDMT SUBQ TIS 1ST 20SQCM/<: CPT | Performed by: PODIATRIST

## 2021-09-14 PROCEDURE — 250N000011 HC RX IP 250 OP 636: Performed by: PHYSICIAN ASSISTANT

## 2021-09-14 PROCEDURE — 258N000003 HC RX IP 258 OP 636: Performed by: ANESTHESIOLOGY

## 2021-09-14 PROCEDURE — 272N000001 HC OR GENERAL SUPPLY STERILE: Performed by: SURGERY

## 2021-09-14 PROCEDURE — 0W960ZZ DRAINAGE OF NECK, OPEN APPROACH: ICD-10-PCS | Performed by: SURGERY

## 2021-09-14 PROCEDURE — 710N000009 HC RECOVERY PHASE 1, LEVEL 1, PER MIN: Performed by: SURGERY

## 2021-09-14 PROCEDURE — 120N000001 HC R&B MED SURG/OB

## 2021-09-14 PROCEDURE — 250N000009 HC RX 250: Performed by: NURSE ANESTHETIST, CERTIFIED REGISTERED

## 2021-09-14 PROCEDURE — 999N000141 HC STATISTIC PRE-PROCEDURE NURSING ASSESSMENT: Performed by: SURGERY

## 2021-09-14 PROCEDURE — 250N000025 HC SEVOFLURANE, PER MIN: Performed by: SURGERY

## 2021-09-14 PROCEDURE — 80202 ASSAY OF VANCOMYCIN: CPT

## 2021-09-14 PROCEDURE — 360N000077 HC SURGERY LEVEL 4, PER MIN: Performed by: SURGERY

## 2021-09-14 PROCEDURE — 36415 COLL VENOUS BLD VENIPUNCTURE: CPT | Performed by: ANESTHESIOLOGY

## 2021-09-14 PROCEDURE — 87077 CULTURE AEROBIC IDENTIFY: CPT | Performed by: SURGERY

## 2021-09-14 PROCEDURE — 85027 COMPLETE CBC AUTOMATED: CPT | Performed by: ANESTHESIOLOGY

## 2021-09-14 PROCEDURE — 258N000003 HC RX IP 258 OP 636: Performed by: PHYSICIAN ASSISTANT

## 2021-09-14 PROCEDURE — 10061 I&D ABSCESS COMP/MULTIPLE: CPT | Performed by: SURGERY

## 2021-09-14 PROCEDURE — 84132 ASSAY OF SERUM POTASSIUM: CPT | Performed by: ANESTHESIOLOGY

## 2021-09-14 PROCEDURE — 250N000009 HC RX 250: Performed by: SURGERY

## 2021-09-14 PROCEDURE — 250N000011 HC RX IP 250 OP 636: Performed by: ANESTHESIOLOGY

## 2021-09-14 PROCEDURE — 250N000009 HC RX 250: Performed by: ANESTHESIOLOGY

## 2021-09-14 PROCEDURE — 250N000013 HC RX MED GY IP 250 OP 250 PS 637: Performed by: INTERNAL MEDICINE

## 2021-09-14 PROCEDURE — 250N000013 HC RX MED GY IP 250 OP 250 PS 637: Performed by: ANESTHESIOLOGY

## 2021-09-14 PROCEDURE — 250N000013 HC RX MED GY IP 250 OP 250 PS 637: Performed by: PHYSICIAN ASSISTANT

## 2021-09-14 PROCEDURE — 250N000012 HC RX MED GY IP 250 OP 636 PS 637: Performed by: PHYSICIAN ASSISTANT

## 2021-09-14 PROCEDURE — 99232 SBSQ HOSP IP/OBS MODERATE 35: CPT | Mod: 25 | Performed by: PODIATRIST

## 2021-09-14 PROCEDURE — 36415 COLL VENOUS BLD VENIPUNCTURE: CPT

## 2021-09-14 PROCEDURE — 370N000017 HC ANESTHESIA TECHNICAL FEE, PER MIN: Performed by: SURGERY

## 2021-09-14 RX ORDER — BUPIVACAINE HYDROCHLORIDE AND EPINEPHRINE 5; 5 MG/ML; UG/ML
INJECTION, SOLUTION PERINEURAL PRN
Status: DISCONTINUED | OUTPATIENT
Start: 2021-09-14 | End: 2021-09-14 | Stop reason: HOSPADM

## 2021-09-14 RX ORDER — KETAMINE HYDROCHLORIDE 10 MG/ML
INJECTION INTRAMUSCULAR; INTRAVENOUS PRN
Status: DISCONTINUED | OUTPATIENT
Start: 2021-09-14 | End: 2021-09-14

## 2021-09-14 RX ORDER — SODIUM CHLORIDE, SODIUM LACTATE, POTASSIUM CHLORIDE, CALCIUM CHLORIDE 600; 310; 30; 20 MG/100ML; MG/100ML; MG/100ML; MG/100ML
INJECTION, SOLUTION INTRAVENOUS CONTINUOUS
Status: DISCONTINUED | OUTPATIENT
Start: 2021-09-14 | End: 2021-09-14 | Stop reason: HOSPADM

## 2021-09-14 RX ORDER — FENTANYL CITRATE 0.05 MG/ML
25 INJECTION, SOLUTION INTRAMUSCULAR; INTRAVENOUS EVERY 5 MIN PRN
Status: DISCONTINUED | OUTPATIENT
Start: 2021-09-14 | End: 2021-09-14 | Stop reason: HOSPADM

## 2021-09-14 RX ORDER — PROPOFOL 10 MG/ML
INJECTION, EMULSION INTRAVENOUS PRN
Status: DISCONTINUED | OUTPATIENT
Start: 2021-09-14 | End: 2021-09-14

## 2021-09-14 RX ORDER — FENTANYL CITRATE 50 UG/ML
INJECTION, SOLUTION INTRAMUSCULAR; INTRAVENOUS PRN
Status: DISCONTINUED | OUTPATIENT
Start: 2021-09-14 | End: 2021-09-14

## 2021-09-14 RX ORDER — ACETAMINOPHEN 500 MG
1000 TABLET ORAL ONCE
Status: COMPLETED | OUTPATIENT
Start: 2021-09-14 | End: 2021-09-14

## 2021-09-14 RX ORDER — ONDANSETRON 4 MG/1
4 TABLET, ORALLY DISINTEGRATING ORAL EVERY 30 MIN PRN
Status: DISCONTINUED | OUTPATIENT
Start: 2021-09-14 | End: 2021-09-14 | Stop reason: HOSPADM

## 2021-09-14 RX ORDER — MAGNESIUM HYDROXIDE 1200 MG/15ML
LIQUID ORAL PRN
Status: DISCONTINUED | OUTPATIENT
Start: 2021-09-14 | End: 2021-09-14 | Stop reason: HOSPADM

## 2021-09-14 RX ORDER — ONDANSETRON 2 MG/ML
4 INJECTION INTRAMUSCULAR; INTRAVENOUS EVERY 30 MIN PRN
Status: DISCONTINUED | OUTPATIENT
Start: 2021-09-14 | End: 2021-09-14 | Stop reason: HOSPADM

## 2021-09-14 RX ORDER — ONDANSETRON 2 MG/ML
INJECTION INTRAMUSCULAR; INTRAVENOUS PRN
Status: DISCONTINUED | OUTPATIENT
Start: 2021-09-14 | End: 2021-09-14

## 2021-09-14 RX ORDER — OXYCODONE HYDROCHLORIDE 5 MG/1
5 TABLET ORAL EVERY 4 HOURS PRN
Status: DISCONTINUED | OUTPATIENT
Start: 2021-09-14 | End: 2021-09-14 | Stop reason: HOSPADM

## 2021-09-14 RX ORDER — LIDOCAINE HYDROCHLORIDE 20 MG/ML
INJECTION, SOLUTION INFILTRATION; PERINEURAL PRN
Status: DISCONTINUED | OUTPATIENT
Start: 2021-09-14 | End: 2021-09-14

## 2021-09-14 RX ADMIN — Medication 8 MCG: at 09:02

## 2021-09-14 RX ADMIN — ACETAMINOPHEN 1000 MG: 500 TABLET, FILM COATED ORAL at 07:34

## 2021-09-14 RX ADMIN — Medication 30 MG: at 08:38

## 2021-09-14 RX ADMIN — ACETAMINOPHEN 650 MG: 325 TABLET, FILM COATED ORAL at 21:44

## 2021-09-14 RX ADMIN — SODIUM CHLORIDE, PRESERVATIVE FREE: 5 INJECTION INTRAVENOUS at 11:07

## 2021-09-14 RX ADMIN — FENTANYL CITRATE 100 MCG: 50 INJECTION, SOLUTION INTRAMUSCULAR; INTRAVENOUS at 08:13

## 2021-09-14 RX ADMIN — Medication 12 MCG: at 08:59

## 2021-09-14 RX ADMIN — INSULIN GLARGINE 20 UNITS: 100 INJECTION, SOLUTION SUBCUTANEOUS at 21:36

## 2021-09-14 RX ADMIN — VANCOMYCIN HYDROCHLORIDE 1000 MG: 1 INJECTION, SOLUTION INTRAVENOUS at 19:56

## 2021-09-14 RX ADMIN — PHENYLEPHRINE HYDROCHLORIDE 100 MCG: 10 INJECTION INTRAVENOUS at 08:44

## 2021-09-14 RX ADMIN — ONDANSETRON 4 MG: 2 INJECTION INTRAMUSCULAR; INTRAVENOUS at 08:56

## 2021-09-14 RX ADMIN — SUGAMMADEX 200 MG: 100 INJECTION, SOLUTION INTRAVENOUS at 09:02

## 2021-09-14 RX ADMIN — HYDROMORPHONE HYDROCHLORIDE 0.5 MG: 1 INJECTION, SOLUTION INTRAMUSCULAR; INTRAVENOUS; SUBCUTANEOUS at 01:31

## 2021-09-14 RX ADMIN — OXYCODONE HYDROCHLORIDE 10 MG: 5 TABLET ORAL at 17:53

## 2021-09-14 RX ADMIN — OXYCODONE HYDROCHLORIDE 5 MG: 5 TABLET ORAL at 21:41

## 2021-09-14 RX ADMIN — INSULIN ASPART 2 UNITS: 100 INJECTION, SOLUTION INTRAVENOUS; SUBCUTANEOUS at 06:43

## 2021-09-14 RX ADMIN — PIPERACILLIN SODIUM AND TAZOBACTAM SODIUM 3.38 G: 3; .375 INJECTION, POWDER, LYOPHILIZED, FOR SOLUTION INTRAVENOUS at 11:07

## 2021-09-14 RX ADMIN — LIDOCAINE HYDROCHLORIDE 80 MG: 20 INJECTION, SOLUTION INFILTRATION; PERINEURAL at 08:13

## 2021-09-14 RX ADMIN — PIPERACILLIN SODIUM AND TAZOBACTAM SODIUM 3.38 G: 3; .375 INJECTION, POWDER, LYOPHILIZED, FOR SOLUTION INTRAVENOUS at 05:40

## 2021-09-14 RX ADMIN — VANCOMYCIN HYDROCHLORIDE 1000 MG: 1 INJECTION, SOLUTION INTRAVENOUS at 08:20

## 2021-09-14 RX ADMIN — PROPOFOL 230 MG: 10 INJECTION, EMULSION INTRAVENOUS at 08:13

## 2021-09-14 RX ADMIN — SUCCINYLCHOLINE CHLORIDE 140 MG: 20 INJECTION, SOLUTION INTRAMUSCULAR; INTRAVENOUS; PARENTERAL at 08:13

## 2021-09-14 RX ADMIN — HYDROMORPHONE HYDROCHLORIDE 0.5 MG: 1 INJECTION, SOLUTION INTRAMUSCULAR; INTRAVENOUS; SUBCUTANEOUS at 06:43

## 2021-09-14 RX ADMIN — MIDAZOLAM 2 MG: 1 INJECTION INTRAMUSCULAR; INTRAVENOUS at 08:03

## 2021-09-14 RX ADMIN — INSULIN ASPART 3 UNITS: 100 INJECTION, SOLUTION INTRAVENOUS; SUBCUTANEOUS at 13:06

## 2021-09-14 RX ADMIN — PHENYLEPHRINE HYDROCHLORIDE 100 MCG: 10 INJECTION INTRAVENOUS at 08:32

## 2021-09-14 RX ADMIN — HYDROMORPHONE HYDROCHLORIDE 0.5 MG: 1 INJECTION, SOLUTION INTRAMUSCULAR; INTRAVENOUS; SUBCUTANEOUS at 04:18

## 2021-09-14 RX ADMIN — INSULIN ASPART 2 UNITS: 100 INJECTION, SOLUTION INTRAVENOUS; SUBCUTANEOUS at 17:45

## 2021-09-14 RX ADMIN — SODIUM CHLORIDE, PRESERVATIVE FREE: 5 INJECTION INTRAVENOUS at 05:40

## 2021-09-14 RX ADMIN — ACETAMINOPHEN 650 MG: 325 TABLET, FILM COATED ORAL at 01:31

## 2021-09-14 RX ADMIN — PHENYLEPHRINE HYDROCHLORIDE 100 MCG: 10 INJECTION INTRAVENOUS at 08:24

## 2021-09-14 RX ADMIN — ROCURONIUM BROMIDE 30 MG: 10 INJECTION INTRAVENOUS at 08:22

## 2021-09-14 RX ADMIN — PIPERACILLIN SODIUM AND TAZOBACTAM SODIUM 3.38 G: 3; .375 INJECTION, POWDER, LYOPHILIZED, FOR SOLUTION INTRAVENOUS at 17:46

## 2021-09-14 RX ADMIN — SODIUM CHLORIDE, PRESERVATIVE FREE: 5 INJECTION INTRAVENOUS at 21:46

## 2021-09-14 RX ADMIN — SODIUM CHLORIDE, POTASSIUM CHLORIDE, SODIUM LACTATE AND CALCIUM CHLORIDE: 600; 310; 30; 20 INJECTION, SOLUTION INTRAVENOUS at 07:06

## 2021-09-14 ASSESSMENT — ACTIVITIES OF DAILY LIVING (ADL)
ADLS_ACUITY_SCORE: 19
ADLS_ACUITY_SCORE: 19
ADLS_ACUITY_SCORE: 17
ADLS_ACUITY_SCORE: 17
ADLS_ACUITY_SCORE: 19

## 2021-09-14 ASSESSMENT — LIFESTYLE VARIABLES: TOBACCO_USE: 0

## 2021-09-14 NOTE — BRIEF OP NOTE
[facility]    Brief Operative Note    Pre-operative diagnosis: Cellulitis and abscess of neck [L03.221, L02.11]  Post-operative diagnosis Same as pre-operative diagnosis    Procedure: Procedure(s):  INCISION AND DRAINAGE, POSTERIOR NECK  Surgeon: Surgeon(s) and Role:     * Lucina Dodson MD - Primary     * Felicia Lucero PA-C - Assisting  Anesthesia: General   Estimated blood loss: 10 ml   Drains: None  Specimens:   ID Type Source Tests Collected by Time Destination   A : aerobic and anaerobic cultures of posterior neck wound Wound Neck ANAEROBIC BACTERIAL CULTURE ROUTINE, AEROBIC BACTERIAL CULTURE ROUTINE Lucina Dodson MD 9/14/2021  8:46 AM      Findings:   Necrotic tissue over posterior neck excised.  Diffuse infiltration of surrounding tissues with purulent fluid.  Purulent fluid evacuated using manual compression.  Purulent fluid sent for aerobic and anaerobic culture.  Posterior neck wound thoroughly irrigated and packed open.  Complications: None.  Implants: None    Lucina Dodson MD

## 2021-09-14 NOTE — ANESTHESIA PROCEDURE NOTES
Airway       Patient location during procedure: OR       Procedure Start/Stop Times: 9/14/2021 8:16 AM  Staff -        Anesthesiologist:  Nano Burnham       CRNA: Guera Oconnell APRN CRNA       Other Anesthesia Staff: Luiza Serrano       Performed By: OCHOA  Consent for Airway        Urgency: elective  Indications and Patient Condition       Indications for airway management: monica-procedural       Induction type:intravenous       Mask difficulty assessment: 2 - vent by mask + OA or adjuvant +/- NMBA    Final Airway Details       Final airway type: endotracheal airway       Successful airway: ETT - single  Endotracheal Airway Details        ETT size (mm): 8.0       Cuffed: yes       Successful intubation technique: video laryngoscopy       VL Blade Size: Glidescope 4       Grade View of Cords: 1       Adjucts: stylet       Position: Right       Measured from: gums/teeth       Secured at (cm): 22       Bite block used: None    Post intubation assessment        Placement verified by: capnometry, equal breath sounds and chest rise        Number of attempts at approach: 1       Secured with: pink tape       Ease of procedure: easy       Dentition: Intact and Unchanged

## 2021-09-14 NOTE — PROGRESS NOTES
"[facility]      Assessment:   50-year-old male with hx of poorly-controlled DM2, chronic right foot ulcer, and methamphetamine abuse who was admitted on 9/12/2021 for sepsis due to a large neck abscess.     The foot ulcer is stable without clinical signs of infection.  It could use some excisional debridement    Plan:  Excisional debridement of the right foot ulcer was performed.  Please see below.    I personally reviewed the right foot x-ray images and shared the results with Chris.    Will consult [facility] for wound care recommendations    Inpatient orthotics was provided a DH 2 shoe for offloading of the ulcer.    I encouraged Manoj to follow-up with [facility] podiatry for or [facility] for ongoing monitoring during and treatment of his neuropathic ulceration.    Activity: as tolerated in the offloading shoe    Excisional Debridement    The excisional debridement procedure was discussed.  This included the goals of removing non-viable tissue, evaluating the full extent of wound, and promoting wound healing.  Chris Shea  provided verba consent.  The \"Time Out\" was called, confirming procedure and location.     Using a sterile #15 blade  excisional debridment of the right foot ulcer was performed. The hyperkeratotic eschar surrounding the wound was removed, by excising skin edges back to healthy, bleeding tissue. Non-viable tissue was excised.  Debridement was carried out to the depth of the fat layer. The ulcer base was scraped to remove bioburden and promote healing.  The area debrided was less than 20 square cm.   There was moderate bleeding with the procedure. No anesthesia was needed due to peripheral neuropathy.   A sterile dressing was applied.    Podiatry will sign off at this time.  Thank you for involving us in his care.  Podiatric discharge orders will be placed.    Deepak Whitley DPM, FACFAS, MS  [facility]/Foot & Ankle " Surgery  [facility], [facility], and [facility]  Pager:  [phone]    _______________________________________________________________________      Subjective:  No complaints.  Resting comfortably after her incision and drainage of a posterior neck infection this morning by surgery.    Exam:    B/P: 137/85, T: 98.1, P: 106, R: 18    Vascular: DP & PT pulses are intact & regular bilaterally.  No significant lower extremity edema or varicosities noted.  CFT and skin temperature is normal to both lower extremities.       Neurologic: Lower extremity sensation is grossly diminished, bilateral foot, to light touch.  No evidence of weakness or contracture in the lower extremities.       Musculoskeletal: Patient is ambulatory without assistive device or brace.  No gross foot or ankle deformity noted.       Dermatologic:  0.8cm diameter x 0.4cm deep ulceration sub right first metatarsal head.  It does not probe to bone. Thick hyperkeratotic eschar around the margins with some undermining.     No associated edema, erythema, malodor, or purulence.    XR FOOT RIGHT G/E 3 VIEWS 9/13/2021 4:42 PM      HISTORY: ulcer below the first metataral head                                                                 IMPRESSION: Plantar wound beneath the first metatarsal head as seen on  the tangential view. No apparent osseous destruction or joint space  narrowing. Fifth toe proximal phalangeal deformity, presumably related  to old healed fracture. Forefoot arterial calcification.     MICHAEL CRONIN MD     Lab Results   Component Value Date    WBC 21.7 09/14/2021     Lab Results   Component Value Date    RBC 4.42 09/14/2021     Lab Results   Component Value Date    HGB 12.2 09/14/2021     Lab Results   Component Value Date    HCT 36.3 09/14/2021     No components found for: MCT  Lab Results   Component Value Date    MCV 82 09/14/2021     Lab Results   Component Value Date    MCH 27.6 09/14/2021     Lab Results   Component Value  Date    MCHC 33.6 09/14/2021     Lab Results   Component Value Date    RDW 12.4 09/14/2021     Lab Results   Component Value Date     09/14/2021       All cultures:  No results for input(s): CULT in the last 168 hours.    Hemoglobin   Date Value Ref Range Status   09/14/2021 12.2 (L) 13.3 - 17.7 g/dL Final

## 2021-09-14 NOTE — ANESTHESIA PREPROCEDURE EVALUATION
Anesthesia Pre-Procedure Evaluation    Patient: Chris Shea   MRN: [id number] : 1970        Preoperative Diagnosis: Cellulitis and abscess of neck [L03.221, L02.11]   Procedure : Procedure(s):  INCISION AND DRAINAGE, POSTERIOR NECK     Past Medical History:   Diagnosis Date     Methamphetamine abuse (H)      Right foot ulcer (H)      Type 2 diabetes mellitus with diabetic peripheral angiopathy without gangrene, without long-term current use of insulin (H)       No past surgical history on file.   No Known Allergies   Social History     Tobacco Use     Smoking status: Never Smoker   Substance Use Topics     Alcohol use: No      Wt Readings from Last 1 Encounters:   21 113.4 kg (250 lb)        Anesthesia Evaluation   Pt has had prior anesthetic.     No history of anesthetic complications       ROS/MED HX  ENT/Pulmonary:    (-) tobacco use, asthma and sleep apnea   Neurologic:       Cardiovascular:       METS/Exercise Tolerance:     Hematologic:       Musculoskeletal:       GI/Hepatic:    (-) GERD   Renal/Genitourinary:       Endo:     (+) type II DM,     Psychiatric/Substance Use:       Infectious Disease:       Malignancy:       Other: Comment: Methamphetamine abuse           Physical Exam    Airway        Mallampati: I   TM distance: > 3 FB   Neck ROM: full   Mouth opening: > 3 cm    Respiratory Devices and Support         Dental  no notable dental history         Cardiovascular   cardiovascular exam normal          Pulmonary   pulmonary exam normal                OUTSIDE LABS:  CBC:   Lab Results   Component Value Date    WBC 21.7 (H) 2021    WBC 17.9 (H) 2021    HGB 12.2 (L) 2021    HGB 12.2 (L) 2021    HCT 36.3 (L) 2021    HCT 36.6 (L) 2021     2021     2021     BMP:   Lab Results   Component Value Date     2021     2021    POTASSIUM 3.8 2021    POTASSIUM 4.2 2021    CHLORIDE 102 2021     CHLORIDE 97 09/12/2021    CO2 24 09/13/2021    CO2 26 09/12/2021    BUN 15 09/13/2021    BUN 17 09/12/2021    CR 1.15 09/13/2021    CR 1.24 09/12/2021     (H) 09/14/2021     (H) 09/14/2021     COAGS: No results found for: PTT, INR, FIBR  POC: No results found for: BGM, HCG, HCGS  HEPATIC:   Lab Results   Component Value Date    ALBUMIN 2.6 (L) 09/12/2021    PROTTOTAL 7.9 09/12/2021    ALT 13 09/12/2021    AST 7 09/12/2021    ALKPHOS 178 (H) 09/12/2021    BILITOTAL 0.6 09/12/2021     OTHER:   Lab Results   Component Value Date    PH 7.37 09/12/2021    LACT 1.2 09/12/2021    A1C 13.3 (H) 09/12/2021    ELINOR 8.5 09/13/2021    MAG 1.8 09/12/2021       Anesthesia Plan    ASA Status:  2      Anesthesia Type: General.     - Airway: ETT   Induction: RSI.      Techniques and Equipment:     - Airway: Video-Laryngoscope         Consents    Anesthesia Plan(s) and associated risks, benefits, and realistic alternatives discussed. Questions answered and patient/representative(s) expressed understanding.     - Discussed with:  Patient         Postoperative Care    Pain management: IV analgesics, Oral pain medications.   PONV prophylaxis: Ondansetron (or other 5HT-3), Dexamethasone or Solumedrol     Comments:    Some concern regarding posterior neck abscess.  CT reviewed, no airway concerns.  On examination patient has good extension, >4 fb tm distance, and mallampati 1.  Plan for glidescope.  Fiberoptic in room if needed.              Nano Burnham

## 2021-09-14 NOTE — ANESTHESIA CARE TRANSFER NOTE
Patient: Chris Shea    Procedure(s):  INCISION AND DRAINAGE, POSTERIOR NECK    Diagnosis: Cellulitis and abscess of neck [L03.221, L02.11]  Diagnosis Additional Information: No value filed.    Anesthesia Type:   General     Note:    Oropharynx: oropharynx clear of all foreign objects  Level of Consciousness: awake  Oxygen Supplementation: face mask    Independent Airway: airway patency satisfactory and stable    Vital Signs Stable: post-procedure vital signs reviewed and stable  Report to RN Given: handoff report given  Patient transferred to: PACU    Handoff Report: Identifed the Patient, Identified the Reponsible Provider, Reviewed the pertinent medical history, Discussed the surgical course, Reviewed Intra-OP anesthesia mangement and issues during anesthesia, Set expectations for post-procedure period and Allowed opportunity for questions and acknowledgement of understanding      Vitals:  Vitals Value Taken Time   /70    Temp     Pulse 96    Resp 10    SpO2 99        Electronically Signed By: TALA Reyes CRNA  September 14, 2021  9:19 AM

## 2021-09-14 NOTE — PROGRESS NOTES
[facility]    Medicine Progress Note - Hospitalist Service       Date of Admission:  9/12/2021    Assessment & Plan           Chris Shea is a 50 year old male with hx of poorly-controlled DM2, chronic right foot ulcer, and methamphetamine abuse who was admitted on 9/12/2021 for sepsis due to a large neck abscess     Posterior neck cellulitis  Presented with 2 day history of progressive neck pain, redness, and swelling with purulent drainage after picking at his skin. On arrival, he was noted to be tachycardic with leukocytosis to 16.4k. In the ED, he was initiated on IV vancomycin and pip-tazo.  - Continuing with vancomycin and pip-tazo  - CT neck showed Ill-defined, moderately extensive inflammatory process involving the posterior upper cervical and occipitocervical soft tissues. No evidence for discrete drainable abscess, bone involvement or deep intraspinal extension.  - General Surgery consulted and s/p I&D 9/14, operative cultures sent. follow   - ID consult appreciated, continuing with broad spectrum abx.  - 9/12 blood cultures x2 NGTD     Chronic right mid-foot ulcer  2 cm ulcer over plantar aspect of right mid-foot. States he follows at wound clinic through [facility].   - WOCN consult  - Podiatry consulted,s /p excisional debridement of R foot ulcer 9/14      Methamphetamine abuse  Admits to using methamphetamine twice weekly. Last used: 9/10. Denies IVDU. Was  diaphoretic, likely in early withdrawal.  - Psychiatry consulted-they feel he is not withdrawing and has not used frequently.  As needed hydroxyzine ordered, clonidine or benzodiazepine was not recommended.     DM2 with peripheral vascular disease, uncontrolled  BG>400 on arrival. 6/24/21 A1c 13.8. PTA on metformin 1000 mg BID  - Holding PTA metformin  - Started Lantus 20 units at bedtime  - BS still in 200s, add premeal novolog per carb counting.   - High SSI available  - discussed with patient regarding  insulin at discharge, he is open to it though does not prefer. Nursing to teach how to inject insulin meanwhile.     Diet: Advance Diet as Tolerated: Clear Liquid Diet; 1896-5437 Calories: Moderate Consistent CHO (4-6 CHO units/meal)    DVT Prophylaxis: Pneumatic Compression Devices  Tam Catheter: Not present  Central Lines: None  Code Status: Full Code      Disposition Plan   Expected discharge: 09/17/2021   recommended to unclear - will need to further evaluate after I&D and follow operative cultures.      The patient's care was discussed with the Bedside Nurse and Patient.    Luisa Barfield MD  Hospitalist Service  [facility]  Securely message with the Vocera Web Console (learn more here)  Text page via [employer] Paging/Directory      Clinically Significant Risk Factors Present on Admission                ______________________________________________________________________    Interval History   Care assumed today. Chart reviewed, patient was  Evaluated after OR. He denies pain.  Still feels sleepy. Reports fatigue.   -BS remain uncontrolled. Discussed HbA1C of over 13, he states he will work on it, as it has been there in past and he would like to control it with better diet and medications. Discussed regarding insulin at discharge, he says he is open to it but prefer not to be in insulin if long term.     Data reviewed today: I reviewed all medications, new labs results over the last 24 hours. I personally reviewed no images or EKG's today.    Physical Exam   Vital Signs: Temp: 98.1  F (36.7  C) Temp src: Oral BP: 137/85 Pulse: 106   Resp: 18 SpO2: 95 % O2 Device: Nasal cannula Oxygen Delivery: 1 LPM  Weight: 250 lbs 0 oz    Gen: NAD, pleasant  HEENT: PERRLA EOMI. I&D'ed wound in nape of neck with surrounding edema and erythema.    Resp: CTA b/l, no dyspnea.   CV: S1S2 regular, no murmur. No tachycardia.   Abdo: soft, nontender, nondistended, bowel sounds present  Ext: calves  nontender, well perfused. Rt foot I&D'ed wound not examined.   Neuro: AAOx3, CN grossly intact, no facial asymmetry      Data   Recent Labs   Lab 09/14/21  1150 09/14/21  0934 09/14/21  0636 09/14/21  0442 09/13/21  0917 09/12/21  1820   WBC  --   --   --  21.7* 17.9* 16.4*   HGB  --   --   --  12.2* 12.2* 13.2*   MCV  --   --   --  82 82 83   PLT  --   --   --  289 286 322   NA  --   --   --   --  135 133   POTASSIUM  --   --   --  3.8 4.2 4.3   CHLORIDE  --   --   --   --  102 97   CO2  --   --   --   --  24 26   BUN  --   --   --   --  15 17   CR  --   --   --   --  1.15 1.24   ANIONGAP  --   --   --   --  9 10   ELINOR  --   --   --   --  8.5 9.1   * 212* 189*  --  314* 430*   ALBUMIN  --   --   --   --   --  2.6*   PROTTOTAL  --   --   --   --   --  7.9   BILITOTAL  --   --   --   --   --  0.6   ALKPHOS  --   --   --   --   --  178*   ALT  --   --   --   --   --  13   AST  --   --   --   --   --  7     Recent Results (from the past 24 hour(s))   XR Foot Right G/E 3 Views    Narrative    XR FOOT RIGHT G/E 3 VIEWS 9/13/2021 4:42 PM     HISTORY: ulcer below the first metataral head      Impression    IMPRESSION: Plantar wound beneath the first metatarsal head as seen on  the tangential view. No apparent osseous destruction or joint space  narrowing. Fifth toe proximal phalangeal deformity, presumably related  to old healed fracture. Forefoot arterial calcification.    MICHAEL CRONIN MD         SYSTEM ID:  [id number]

## 2021-09-14 NOTE — PROGRESS NOTES
[facility]    Infectious Disease Progress Note    Date of Service (when I saw the patient): 09/14/2021     Assessment & Plan   Chris Shea is a 50 year old male who was admitted on 9/12/2021.     Impression:  1. 50 y.o male with diabetes.   2. Methamphetamine use.   3. Chronic right foot ulcer.   4. Admitted on 9/12/2021 for sepsis due to a large neck abscess  5. On vancomycin and pip-tazo  6. s/p I&D cultures pending.   5. Pending blood cultures.         Recommendations:   For now continue on broad spectrum antibiotics.   Follow up on the pending cultures       Lexis Miller MD    Interval History   S/p I and D   Cultures from the OR pending   WBC up   No fever     Physical Exam   Temp: 98.1  F (36.7  C) Temp src: Oral BP: (!) 161/102 Pulse: 104   Resp: 18 SpO2: 94 % O2 Device: None (Room air) Oxygen Delivery: 9 LPM  Vitals:    09/12/21 1817   Weight: 113.4 kg (250 lb)     Vital Signs with Ranges  Temp:  [98.1  F (36.7  C)-100.6  F (38.1  C)] 98.1  F (36.7  C)  Pulse:  [] 104  Resp:  [9-19] 18  BP: (111-161)/() 161/102  SpO2:  [93 %-100 %] 94 %    Constitutional: Awake, alert, cooperative, no apparent distress  Lungs: Clear to auscultation bilaterally, no crackles or wheezing  Cardiovascular: Regular rate and rhythm, normal S1 and S2, and no murmur noted  Abdomen: Normal bowel sounds, soft, non-distended, non-tender  Skin: No rashes, no cyanosis, no edema  Other:    Medications     Another Antibiotic has been ordered.       sodium chloride 100 mL/hr at 09/14/21 0540       insulin aspart  1-10 Units Subcutaneous TID AC     insulin aspart  1-7 Units Subcutaneous At Bedtime     insulin glargine  20 Units Subcutaneous QPM     piperacillin-tazobactam  3.375 g Intravenous Q6H     sodium chloride (PF)  3 mL Intracatheter Q8H     vancomycin  1,000 mg Intravenous Q12H       Data   All microbiology laboratory data reviewed.  Recent Labs   Lab Test 09/14/21  0442 09/13/21  0917  09/12/21  1820   WBC 21.7* 17.9* 16.4*   HGB 12.2* 12.2* 13.2*   HCT 36.3* 36.6* 39.6*   MCV 82 82 83    286 322     Recent Labs   Lab Test 09/13/21  0917 09/12/21  1820   CR 1.15 1.24     No lab results found.  No lab results found.    Invalid input(s): UC

## 2021-09-14 NOTE — PROGRESS NOTES
Surgery:    50-year-old male with posterior neck infection.  White blood cell count increasing.  Patient now agreeable to the surgical intervention.  Will proceed to the operating room this morning for incision and drainage of posterior neck infection.  Risks and benefits discussed.  Patient in agreement with this plan.    Lucina Dodson MD

## 2021-09-14 NOTE — OP NOTE
Procedure Date: 09/14/2021    SURGEON:  Lucina Dodson MD    ASSISTANT:  VERONICA Chand    PREOPERATIVE DIAGNOSIS:  Posterior neck infection.    POSTOPERATIVE DIAGNOSIS:  Posterior neck infection.    PROCEDURE PERFORMED:  Incision and drainage of posterior neck infection.    INDICATIONS FOR OPERATION:  Chris is a 50-year-old male who presented with posterior neck wound and related pain.  CT scan was completed and this demonstrated significant inflammatory process of the posterior neck area, but no evidence of discreetly drainable abscess.  The patient was found to have purulent fluid leaking from his posterior neck wound. Despite IV antibiotic therapy, the patient's white blood cell count was continuing to increase.  The patient finally agreed to present to the operating room for incision and drainage of his posterior neck infection.  The risks and benefits of the procedure were discussed with the patient and consent for the procedure was obtained.    ANESTHESIA:  General.    DESCRIPTION OF PROCEDURE:  The patient was brought to the operating room and placed in the supine position on the operating table.  A timeout was completed.  Anesthesia was induced and the patient was intubated.  The patient was transferred to the prone position.  He was secured to the operating table and his pressure points were padded.  The patient's posterior neck was prepped and draped in the sterile fashion.  There was a central area of superficial necrosis overlying the patient's large area of posterior neck cellulitis.  The electrocautery was used to make an elliptical incision to excise this necrotic tissue.  We dissected down deeper into the wound and did not encounter any large pockets of purulent fluid.  However, there was diffuse infiltration of the posterior neck tissues with purulent fluid.  We were able to use external manual compression to milk this purulent fluid into our open wound.  Samples of this fluid were sent for both  aerobic and anaerobic culture.  The electrocautery was used to further dissect circumferentially to open up the tissues of the posterior neck.  Several rounds of manual compression were completed.  The patient's posterior neck wound was then copiously irrigated with normal saline solution.  The tissues were infiltrated with Marcaine local anesthetic.  The wound was then packed open with Betadine-soaked Kerlix fluff.  Hemostasis was ensured throughout the procedure.  Sterile dressings were applied.  The patient tolerated the procedure well.  He was extubated and taken to the recovery area in stable condition.    Felicia Lucero PA-C assisted in the above procedure. They provided assistance with pre-operative positioning, prepping, and draping of the patient. The assistant provided vital operative assistance with retraction using instruments thus providing the necessary exposure and visualization for the case, manipulation of tissues to achieve hemostasis, suction for visualization. Post-operatively they assisted in transfer of the patient off the operative table and transition to the PACU physicians.      ESTIMATED BLOOD LOSS:  10 mL    FINDINGS:  Necrotic tissue over posterior neck excised.  Diffuse infiltration of surrounding tissues with purulent fluid.  Purulent fluid evacuated using manual compression.  Purulent fluid sent for aerobic and anaerobic culture.  Posterior neck wound thoroughly irrigated and packed open.    COMPLICATIONS:  None.    SPECIMENS:  Purulent fluid sent for aerobic and anaerobic culture.    DRAINS:  None.    CONDITION:  The patient will be readmitted to the surgical floor with instructions for postoperative cares and medications for pain management.    Lucina Dodson MD        D: 2021   T: 2021   MT: md    Name:     PADMINI ALEXANDER  MRN:      [id number]        Account:        [id number]   :      1970           Procedure Date: 2021     Document: [id number]

## 2021-09-14 NOTE — ANESTHESIA POSTPROCEDURE EVALUATION
Patient: Chris Shea    Procedure(s):  INCISION AND DRAINAGE, POSTERIOR NECK    Diagnosis:Cellulitis and abscess of neck [L03.221, L02.11]  Diagnosis Additional Information: No value filed.    Anesthesia Type:  General    Note:  Disposition: Inpatient   Postop Pain Control: Uneventful            Sign Out: Well controlled pain   PONV: No   Neuro/Psych: Uneventful            Sign Out: Acceptable/Baseline neuro status   Airway/Respiratory: Uneventful            Sign Out: Acceptable/Baseline resp. status   CV/Hemodynamics: Uneventful            Sign Out: Acceptable CV status; No obvious hypovolemia; No obvious fluid overload   Other NRE: NONE   DID A NON-ROUTINE EVENT OCCUR?            Last vitals:  Vitals Value Taken Time   /88 09/14/21 1010   Temp 37.4  C (99.4  F) 09/14/21 1000   Pulse 103 09/14/21 1016   Resp 17 09/14/21 1016   SpO2 97 % 09/14/21 1018   Vitals shown include unvalidated device data.    Electronically Signed By: Nano Burnham  September 14, 2021  10:24 AM

## 2021-09-14 NOTE — PLAN OF CARE
DATE & TIME: 21-21 8760-2913   Cognitive Concerns/ Orientation : A/Ox4; forgetful.    BEHAVIOR & AGGRESSION TOOL COLOR: Green-yellow; frustrated at times that pt is not receiving pain medications, educated pt that pain mediations are PRN and that he needs to ask for them. Pt swearing at staff overnight  CIWA SCORE: NA  ABNL VS/O2: VSS on RA ex tachy (100s)  MOBILITY: SBA  PAIN MANAGMENT:  neck pain due to wound with abscess; managed with IV dilaudid x3, and PRN Tylenol x1  DIET: NPO  BOWEL/BLADDER: Continent. No BM this shift  ABNL LAB/BG: B, 260, WBC: 21.7  DRAIN/DEVICES: PIV infusing with IVF @100 ml/hr. on IV Zosyn and  Vanco.   TELEMETRY RHYTHM: ST with PVCs  SKIN: large cellulitis to neck with abscess-erythema boarders are marked, swelling noted, warm to touch, drainage purulent to pink, attempted to apply dressing to neck but pt refusing. R foot ulcer, no drainage. Rolando LE neuropathy baseline.   TESTS/PROCEDURES: I&D 21 at 0830, right foot  wound debridement at bedside today per podiatry note, R foot xray complete  D/C DATE: Pending improvement after I&D  Discharge Barriers: neck abscess; pain; R foot wound  OTHER IMPORTANT INFO: Hx of methamphetamine abuse

## 2021-09-15 LAB
ANION GAP SERPL CALCULATED.3IONS-SCNC: 2 MMOL/L (ref 3–14)
BASOPHILS # BLD AUTO: 0.1 10E3/UL (ref 0–0.2)
BASOPHILS NFR BLD AUTO: 1 %
BUN SERPL-MCNC: 10 MG/DL (ref 7–30)
CALCIUM SERPL-MCNC: 8.2 MG/DL (ref 8.5–10.1)
CHLORIDE BLD-SCNC: 102 MMOL/L (ref 94–109)
CO2 SERPL-SCNC: 30 MMOL/L (ref 20–32)
CREAT SERPL-MCNC: 1.11 MG/DL (ref 0.66–1.25)
EOSINOPHIL # BLD AUTO: 0.3 10E3/UL (ref 0–0.7)
EOSINOPHIL NFR BLD AUTO: 2 %
ERYTHROCYTE [DISTWIDTH] IN BLOOD BY AUTOMATED COUNT: 13 % (ref 10–15)
GFR SERPL CREATININE-BSD FRML MDRD: 76 ML/MIN/1.73M2
GLUCOSE BLD-MCNC: 251 MG/DL (ref 70–99)
GLUCOSE BLDC GLUCOMTR-MCNC: 170 MG/DL (ref 70–99)
GLUCOSE BLDC GLUCOMTR-MCNC: 194 MG/DL (ref 70–99)
GLUCOSE BLDC GLUCOMTR-MCNC: 203 MG/DL (ref 70–99)
GLUCOSE BLDC GLUCOMTR-MCNC: 206 MG/DL (ref 70–99)
GLUCOSE BLDC GLUCOMTR-MCNC: 244 MG/DL (ref 70–99)
GLUCOSE BLDC GLUCOMTR-MCNC: 248 MG/DL (ref 70–99)
HCT VFR BLD AUTO: 33.3 % (ref 40–53)
HGB BLD-MCNC: 10.8 G/DL (ref 13.3–17.7)
IMM GRANULOCYTES # BLD: 0.5 10E3/UL
IMM GRANULOCYTES NFR BLD: 4 %
LYMPHOCYTES # BLD AUTO: 1.8 10E3/UL (ref 0.8–5.3)
LYMPHOCYTES NFR BLD AUTO: 13 %
MCH RBC QN AUTO: 27.4 PG (ref 26.5–33)
MCHC RBC AUTO-ENTMCNC: 32.4 G/DL (ref 31.5–36.5)
MCV RBC AUTO: 85 FL (ref 78–100)
MONOCYTES # BLD AUTO: 1.2 10E3/UL (ref 0–1.3)
MONOCYTES NFR BLD AUTO: 9 %
NEUTROPHILS # BLD AUTO: 10.3 10E3/UL (ref 1.6–8.3)
NEUTROPHILS NFR BLD AUTO: 71 %
NRBC # BLD AUTO: 0 10E3/UL
NRBC BLD AUTO-RTO: 0 /100
PLATELET # BLD AUTO: 264 10E3/UL (ref 150–450)
POTASSIUM BLD-SCNC: 4 MMOL/L (ref 3.4–5.3)
RBC # BLD AUTO: 3.94 10E6/UL (ref 4.4–5.9)
SODIUM SERPL-SCNC: 134 MMOL/L (ref 133–144)
WBC # BLD AUTO: 14.2 10E3/UL (ref 4–11)

## 2021-09-15 PROCEDURE — 99233 SBSQ HOSP IP/OBS HIGH 50: CPT | Performed by: HOSPITALIST

## 2021-09-15 PROCEDURE — 36415 COLL VENOUS BLD VENIPUNCTURE: CPT | Performed by: HOSPITALIST

## 2021-09-15 PROCEDURE — 80048 BASIC METABOLIC PNL TOTAL CA: CPT | Performed by: HOSPITALIST

## 2021-09-15 PROCEDURE — 250N000013 HC RX MED GY IP 250 OP 250 PS 637: Performed by: HOSPITALIST

## 2021-09-15 PROCEDURE — 250N000011 HC RX IP 250 OP 636: Performed by: INTERNAL MEDICINE

## 2021-09-15 PROCEDURE — 120N000001 HC R&B MED SURG/OB

## 2021-09-15 PROCEDURE — 258N000003 HC RX IP 258 OP 636: Performed by: INTERNAL MEDICINE

## 2021-09-15 PROCEDURE — 250N000012 HC RX MED GY IP 250 OP 636 PS 637: Performed by: HOSPITALIST

## 2021-09-15 PROCEDURE — 250N000011 HC RX IP 250 OP 636: Performed by: PHYSICIAN ASSISTANT

## 2021-09-15 PROCEDURE — 250N000013 HC RX MED GY IP 250 OP 250 PS 637: Performed by: PHYSICIAN ASSISTANT

## 2021-09-15 PROCEDURE — G0463 HOSPITAL OUTPT CLINIC VISIT: HCPCS

## 2021-09-15 PROCEDURE — 85025 COMPLETE CBC W/AUTO DIFF WBC: CPT | Performed by: HOSPITALIST

## 2021-09-15 RX ADMIN — INSULIN GLARGINE 25 UNITS: 100 INJECTION, SOLUTION SUBCUTANEOUS at 20:06

## 2021-09-15 RX ADMIN — INSULIN ASPART 3 UNITS: 100 INJECTION, SOLUTION INTRAVENOUS; SUBCUTANEOUS at 13:56

## 2021-09-15 RX ADMIN — ACETAMINOPHEN 650 MG: 325 TABLET, FILM COATED ORAL at 08:05

## 2021-09-15 RX ADMIN — METFORMIN HYDROCHLORIDE 500 MG: 500 TABLET, FILM COATED ORAL at 17:31

## 2021-09-15 RX ADMIN — OXYCODONE HYDROCHLORIDE 10 MG: 5 TABLET ORAL at 23:34

## 2021-09-15 RX ADMIN — VANCOMYCIN HYDROCHLORIDE 1250 MG: 5 INJECTION, POWDER, LYOPHILIZED, FOR SOLUTION INTRAVENOUS at 20:06

## 2021-09-15 RX ADMIN — PIPERACILLIN SODIUM AND TAZOBACTAM SODIUM 3.38 G: 3; .375 INJECTION, POWDER, LYOPHILIZED, FOR SOLUTION INTRAVENOUS at 00:30

## 2021-09-15 RX ADMIN — INSULIN ASPART 5 UNITS: 100 INJECTION, SOLUTION INTRAVENOUS; SUBCUTANEOUS at 08:03

## 2021-09-15 RX ADMIN — PIPERACILLIN SODIUM AND TAZOBACTAM SODIUM 3.38 G: 3; .375 INJECTION, POWDER, LYOPHILIZED, FOR SOLUTION INTRAVENOUS at 17:31

## 2021-09-15 RX ADMIN — OXYCODONE HYDROCHLORIDE 10 MG: 5 TABLET ORAL at 15:20

## 2021-09-15 RX ADMIN — METFORMIN HYDROCHLORIDE 500 MG: 500 TABLET, FILM COATED ORAL at 10:12

## 2021-09-15 RX ADMIN — OXYCODONE HYDROCHLORIDE 10 MG: 5 TABLET ORAL at 05:47

## 2021-09-15 RX ADMIN — PIPERACILLIN SODIUM AND TAZOBACTAM SODIUM 3.38 G: 3; .375 INJECTION, POWDER, LYOPHILIZED, FOR SOLUTION INTRAVENOUS at 23:34

## 2021-09-15 RX ADMIN — HYDROMORPHONE HYDROCHLORIDE 0.5 MG: 1 INJECTION, SOLUTION INTRAMUSCULAR; INTRAVENOUS; SUBCUTANEOUS at 00:42

## 2021-09-15 RX ADMIN — PIPERACILLIN SODIUM AND TAZOBACTAM SODIUM 3.38 G: 3; .375 INJECTION, POWDER, LYOPHILIZED, FOR SOLUTION INTRAVENOUS at 05:46

## 2021-09-15 RX ADMIN — INSULIN ASPART 3 UNITS: 100 INJECTION, SOLUTION INTRAVENOUS; SUBCUTANEOUS at 18:21

## 2021-09-15 RX ADMIN — ACETAMINOPHEN 650 MG: 325 TABLET, FILM COATED ORAL at 16:12

## 2021-09-15 RX ADMIN — VANCOMYCIN HYDROCHLORIDE 1250 MG: 5 INJECTION, POWDER, LYOPHILIZED, FOR SOLUTION INTRAVENOUS at 08:08

## 2021-09-15 RX ADMIN — PIPERACILLIN SODIUM AND TAZOBACTAM SODIUM 3.38 G: 3; .375 INJECTION, POWDER, LYOPHILIZED, FOR SOLUTION INTRAVENOUS at 11:50

## 2021-09-15 ASSESSMENT — ACTIVITIES OF DAILY LIVING (ADL)
ADLS_ACUITY_SCORE: 16
ADLS_ACUITY_SCORE: 16
ADLS_ACUITY_SCORE: 19
ADLS_ACUITY_SCORE: 16
ADLS_ACUITY_SCORE: 16
ADLS_ACUITY_SCORE: 19

## 2021-09-15 NOTE — PHARMACY-VANCOMYCIN DOSING SERVICE
"Pharmacy Vancomycin Note  Date of Service 2021  Patient's  1970   50 year old, male    Indication: Abscess  Day of Therapy: 3   Current vancomycin regimen:  1000 mg IV q12h  Current vancomycin monitoring method: AUC  Current vancomycin therapeutic monitoring goal: 400-600 mg*h/L    Current estimated CrCl = Estimated Creatinine Clearance: 100.6 mL/min (based on SCr of 1.15 mg/dL).    Creatinine for last 3 days  2021:  6:20 PM Creatinine 1.24 mg/dL  2021:  9:17 AM Creatinine 1.15 mg/dL    Recent Vancomycin Levels (past 3 days)  2021:  6:45 PM Vancomycin 9.2 mg/L    Vancomycin IV Administrations (past 72 hours)                   vancomycin (VANCOCIN) 1000 mg in dextrose 5% 200 mL PREMIX (mg) 1,000 mg New Bag 21     1,000 mg Given  820     1,000 mg New Bag 21     1,000 mg New Bag  0834    vancomycin 2500 mg in 0.9% NaCl 500 ml intermittent infusion 2,500 mg (mg) 2,500 mg New Bag 21 1950                Nephrotoxins and other renal medications (From now, onward)    Start     Dose/Rate Route Frequency Ordered Stop    09/15/21 0800  vancomycin 1250 mg in 0.9% NaCl 250 mL intermittent infusion 1,250 mg      1,250 mg  over 90 Minutes Intravenous EVERY 12 HOURS 219      21 0000  piperacillin-tazobactam (ZOSYN) 3.375 g vial to attach to  mL bag     Note to Pharmacy: For [facility], [facility] and [facility]: For Zosyn-naive patients, use the \"Zosyn initial dose + extended infusion\" order panel.    3.375 g  over 30 Minutes Intravenous EVERY 6 HOURS 21 2224               Contrast Orders - past 72 hours (72h ago, onward)    Start     Dose/Rate Route Frequency Ordered Stop    21 1925  iopamidol (ISOVUE-370) solution 80 mL      80 mL Intravenous ONCE 21 19221 1932          Interpretation of levels and current regimen:  Vancomycin level is reflective of -600    Has serum creatinine changed greater than 50% in last 72 hours: " No    Urine output:  good urine output    Renal Function: Stable  Loading dose: N/A  Regimen: 1250 mg IV every 12 hours.  Start time: 22:08 on 09/14/2021  Exposure target: AUC24 (range)400-600 mg/L.hr   AUC24,ss: 429 mg/L.hr  Probability of AUC24 > 400: 65 %  Ctrough,ss: 12.7 mg/L  Probability of Ctrough,ss > 20: 4 %  Probability of nephrotoxicity (Lodise MARKO 2009): 8 %        Plan:  1. Increase Dose to 1250 every 12 hours   2. Vancomycin monitoring method: AUC  3. Vancomycin therapeutic monitoring goal: 400-600 mg*h/L  4. Pharmacy will check vancomycin levels as appropriate in 1-3 Days.  5. Serum creatinine levels will be ordered daily for the first week of therapy and at least twice weekly for subsequent weeks.    Courtney Warren [facility]

## 2021-09-15 NOTE — CONSULTS
[facility] Nurse Inpatient Wound Assessment   Reason for consultation: Evaluate and treat  R foot wounds    Assessment  R foot wounds due to Diabetic Ulcer with I&D performed by podiatry on 9/14/21  Status: initial assessment    Treatment Plan  R foot wounds: Daily  and PRN   1. Cleanse with Vashe (679129). Pat dry.  2. Apply Cavilon No Sting Barrier Film to periwound skin  2. Moisten NuGauze ([id number]) with VASHE and lightly fill wound bed.  3. Cover with 2x4 PolyMem Foam ([id number]).  4. Time and Date  5. Ensure wearing orthotic when up.    Orders Written  Recommended provider order: None, at this time  WO Nurse follow-up plan:weekly  Nursing to notify the Provider(s) and re-consult the [facility] Nurse if wound(s) deteriorates or new skin concern.    Patient History  According to provider note(s):         Chris Shea is a 50 year old male with hx of poorly-controlled DM2, chronic right foot ulcer, and methamphetamine abuse who was admitted on 9/12/2021 for sepsis due to a large neck abscess   Posterior neck cellulitis  Chronic right mid-foot ulcer  2 cm ulcer over plantar aspect of right mid-foot. States he follows at wound clinic through [facility].   - WOCN consult  - Podiatry consulted,s /p excisional debridement of R foot ulcer 9/14      Methamphetamine abuse  DM2 with peripheral vascular disease, uncontrolled  Objective Data  Containment of urine/stool: Continent of bladder and Continent of bowel    Active Diet Order  Orders Placed This Encounter      Advance Diet as Tolerated: Regular Diet Adult; 2350-4449 Calories: Moderate Consistent CHO (4-6 CHO units/meal)      Output:   I/O last 3 completed shifts:  In: 3776.66 [P.O.:440; I.V.:2590.66]  Out: -     Risk Assessment:   Sensory Perception: 4-->no impairment  Moisture: 4-->rarely moist  Activity: 3-->walks occasionally  Mobility: 3-->slightly limited  Nutrition: 3-->adequate  Friction and Shear: 3-->no apparent problem  Mio Score: 20                           Labs:   Recent Labs   Lab 09/15/21  0911 09/13/21  0917 09/12/21  1820   ALBUMIN  --   --  2.6*   HGB 10.8*   < > 13.2*   WBC 14.2*   < > 16.4*   A1C  --   --  13.3*    < > = values in this interval not displayed.       Physical Exam  Areas of skin assessed: focused R plantar foot    Wound Location:  R plantar foot  Date of last photo 9/15/21         Wound History: Patient with Chronic R foot ulcer that had excisional debridement by podiatry 9/14/21. Patient was not interested in communicating further with [facility]. Was frustrated that he couldn't sleep.    Wound Base: 100 % non-granular tissue. Pink and moist     Palpation of the wound bed: normal      Drainage: small     Description of drainage: serous     Measurements (length x width x depth, in cm) 0.9  x 0.9  x  0.6 cm      Tunneling N/A     Undermining N/A  Periwound skin: intact      Color: normal and consistent with surrounding tissue and with iodine staining      Temperature: normal   Odor: none  Pain: absent and denies , none      Interventions  Visual inspection and assessment completed  Wound Care Rationale Protect periwound skin  Wound Care: packed with saline gauze today and ordered vashe  Supplies: ordered: vash and nugauze and discussed with RN  Current off-loading measures: Pillows under calves and Pillows  Current support surface: Standard  Atmos Air mattress  Education provided to: importance of repositioning, plan of care and Off-loading pressure  Discussed plan of care with Patient and Nurse    Luiza Rushing RN [facility]OCN

## 2021-09-15 NOTE — PLAN OF CARE
DATE & TIME: 09/14/21, 7 p to 11 p  Cognitive Concerns/ Orientation : A&O x 4  BEHAVIOR & AGGRESSION TOOL COLOR: Green, calm and cooperative  CIWA SCORE: NA  ABNL VS/O2: on RA, tachycardic high 90s to low 100s  MOBILITY: SBA, refuses GB, needs to wear orthotic boots when out of bed  PAIN MANAGMENT:  PRN  Oxy and Tylenol x 1, cold pack for headache  DIET: full liquid, tolerating, advanced to   BOWEL/BLADDER: Continent. No BM this shift  ABNL LAB/BG:  WBC: 21.7, Cultures pending  DRAIN/DEVICES: PIV infusing with IVF @100 ml/hr. on IV Zosyn and  Vanco.   TELEMETRY RHYTHM: ST   SKIN: I and D done today posterior neck abscess, reinforce dressing PRN, R foot ulcer I and  D done at bedside  by podiatry,dressing c/d/i  TESTS/PROCEDURES:   D/C DATE: Unclear, pending wound cultures

## 2021-09-15 NOTE — DISCHARGE INSTRUCTIONS
R foot wounds: Daily  And as needed  1. Cleanse with Vashe or wound cleanser. Pat dry.  2. Apply Cavilon No Sting Barrier Film to intact skin around wound  2. Moisten NuGauze with VASHE or normal saline and lightly fill wound bed.  3. Cover with sterile gauze and tape.  5. Ensure wearing orthotic when up.

## 2021-09-15 NOTE — PLAN OF CARE
DATE & TIME: 09/15/2021 1818-0888   Cognitive Concerns/ Orientation : Alert/oriented x 4   BEHAVIOR & AGGRESSION TOOL COLOR: Green   ABNL VS/O2: VSS, room air  MOBILITY: Assist-1 with IV pole  PAIN MANAGMENT: tylenol given x 2, oxycodone x 1  for neck pain  DIET: Moderate carbohydrate with carb count   BOWEL/BLADDER: continent  ABNL LAB/BG: WBC 14.2, hgb 12.2;  , 206,194, Hgb A1C 13.3  DRAIN/DEVICES: L PIV; R PIV saline locked; IV Zosyn (Q6hrs), IV Vanco (Q12hrs)  TELEMETRY RHYTHM:none  SKIN: I&D 9/14 on Abscess on nape of neck, serosanguinous drainage. Dressing changed;  R foot dressing c/d/i  (orthotic shoe)  TESTS/PROCEDURES: n/a  D/C DATE: Pending wound cultures, ID surgery woc following.possible discharge to TCU, SW following.

## 2021-09-15 NOTE — PROGRESS NOTES
[facility]    Infectious Disease Progress Note    Date of Service (when I saw the patient): 09/15/2021     Assessment & Plan   Chris Shea is a 50 year old male who was admitted on 9/12/2021.     Impression:  1. 50 y.o male with diabetes.   2. Methamphetamine use.   3. Chronic right foot ulcer.   4. Admitted on 9/12/2021 for sepsis due to a large neck abscess  5. On vancomycin and pip-tazo  6. s/p I&D cultures pending.   5. Pending blood cultures.         Recommendations:   For now continue on broad spectrum antibiotics.   Follow up on the pending cultures       Lexis Miller MD    Interval History   S/p I and D   Cultures from the OR pending   WBC up   No fever     Physical Exam   Temp: 97.5  F (36.4  C) Temp src: Axillary BP: 125/89 Pulse: 96   Resp: 16 SpO2: 95 % O2 Device: None (Room air) Oxygen Delivery: 1 LPM  Vitals:    09/12/21 1817   Weight: 113.4 kg (250 lb)     Vital Signs with Ranges  Temp:  [97.5  F (36.4  C)-99.4  F (37.4  C)] 97.5  F (36.4  C)  Pulse:  [] 96  Resp:  [12-19] 16  BP: (119-161)/() 125/89  SpO2:  [93 %-100 %] 95 %    Constitutional: Awake, alert, cooperative, no apparent distress  Lungs: Clear to auscultation bilaterally, no crackles or wheezing  Cardiovascular: Regular rate and rhythm, normal S1 and S2, and no murmur noted  Abdomen: Normal bowel sounds, soft, non-distended, non-tender  Skin: No rashes, no cyanosis, no edema  Other:    Medications     Another Antibiotic has been ordered.         insulin aspart   Subcutaneous TID AC     insulin aspart  1-10 Units Subcutaneous TID AC     insulin aspart  1-7 Units Subcutaneous At Bedtime     insulin glargine  20 Units Subcutaneous QPM     metFORMIN  500 mg Oral BID w/meals     piperacillin-tazobactam  3.375 g Intravenous Q6H     sodium chloride (PF)  3 mL Intracatheter Q8H     vancomycin  1,250 mg Intravenous Q12H       Data   All microbiology laboratory data reviewed.  Recent Labs   Lab Test  09/14/21  0442 09/13/21  0917 09/12/21  1820   WBC 21.7* 17.9* 16.4*   HGB 12.2* 12.2* 13.2*   HCT 36.3* 36.6* 39.6*   MCV 82 82 83    286 322     Recent Labs   Lab Test 09/13/21  0917 09/12/21  1820   CR 1.15 1.24     No lab results found.  No lab results found.    Invalid input(s): UC

## 2021-09-15 NOTE — PROGRESS NOTES
DATE & TIME: 09/14/2021 Night    Cognitive Concerns/ Orientation : Alert/oriented x 4   BEHAVIOR & AGGRESSION TOOL COLOR: Green   ABNL VS/O2: VSS, room air  MOBILITY: Assist-1 with IV pole  PAIN MANAGMENT: Oxycodone given x 1 for neck pain  DIET: Moderate carbohydrate with carb count (0343-3849 Kcals/meal, 4-6 carb units per meal)  BOWEL/BLADDER: Using urinal at bedside, no BM  ABNL LAB/BG: WBC 21.7; hgb 12.2; hematocrit 36.3;   DRAIN/DEVICES: L PIV infusing Normal Saline at 100 mL/hour; R PIV saline locked; IV Zosyn (Q6hrs), IV Vanco (Q12hrs)  TELEMETRY RHYTHM: NSR  SKIN: I&D 9/14 on Abscess on nape of neck, serosanguinous drainage. Dressing reinforced, pillow case changed; I&D on R food ulcer by podiatry (orthotic shoe)  TESTS/PROCEDURES: n/a  D/C DATE: Pending Operative cultures I&D results

## 2021-09-15 NOTE — CONSULTS
Care Management Initial Consult    General Information  Assessment completed with: Patient,    Type of CM/SW Visit: Initial Assessment    Primary Care Provider verified and updated as needed:     Readmission within the last 30 days: no previous admission in last 30 days      Reason for Consult: discharge planning  Advance Care Planning: Advance Care Planning Reviewed: no concerns identified          Communication Assessment  Patient's communication style: spoken language (English or Bilingual)    Hearing Difficulty or Deaf: no   Wear Glasses or Blind: no    Cognitive  Cognitive/Neuro/Behavioral: WDL  Level of Consciousness: alert  Arousal Level: opens eyes spontaneously  Orientation: oriented x 4  Mood/Behavior: calm, cooperative  Best Language: 0 - No aphasia  Speech: clear    Living Environment:   People in home: other (see comments) (reportedly homeless)     Current living Arrangements:        Able to return to prior arrangements:         Family/Social Support:  Care provided by: self  Provides care for: no one, unable/limited ability to care for self  Marital Status: Single  Significant Other          Description of Support System: Involved    Support Assessment: Adequate social supports    Current Resources:   Patient receiving home care services:       Community Resources:    Equipment currently used at home:    Supplies currently used at home:      Employment/Financial:  Employment Status:          Financial Concerns: No concerns identified           Lifestyle & Psychosocial Needs:  Social Determinants of Health     Tobacco Use: Unknown     Smoking Tobacco Use: Never Smoker     Smokeless Tobacco Use: Unknown   Alcohol Use:      Frequency of Alcohol Consumption:      Average Number of Drinks:      Frequency of Binge Drinking:    Financial Resource Strain:      Difficulty of Paying Living Expenses:    Food Insecurity:      Worried About Running Out of Food in the Last Year:      Ran Out of Food in the Last  Year:    Transportation Needs:      Lack of Transportation (Medical):      Lack of Transportation (Non-Medical):    Physical Activity:      Days of Exercise per Week:      Minutes of Exercise per Session:    Stress:      Feeling of Stress :    Social Connections:      Frequency of Communication with Friends and Family:      Frequency of Social Gatherings with Friends and Family:      Attends Yazdanism Services:      Active Member of Clubs or Organizations:      Attends Club or Organization Meetings:      Marital Status:    Intimate Partner Violence:      Fear of Current or Ex-Partner:      Emotionally Abused:      Physically Abused:      Sexually Abused:    Depression:      PHQ-2 Score:    Housing Stability:      Unable to Pay for Housing in the Last Year:      Number of Places Lived in the Last Year:      Unstable Housing in the Last Year:        Functional Status:  Prior to admission patient needed assistance:              Mental Health Status:          Chemical Dependency Status:                Values/Beliefs:  Spiritual, Cultural Beliefs, Yazdanism Practices, Values that affect care:  (Confucianism)               Additional Information:    Pt was admitted on 9/12/21 for cellulitis and abscess of neck.  Pt reports Methamphetamine abuse but denies IV drug abuse.  Pt is reportedly homeless.  At discharge pt will have foot/neck wound needs, may need IV abx at discharge, is diabetic, and will wear a boot on his foot.  PT/OT have not assessed pt, however, pt will have significant needs at discharge for warrant TCU stay.    [facility] met with pt to discuss his after-care needs and pt is agreeable to TCU, confirming he has been to TCU in the past after a previous back surgery.  Pt is not partial to any particular TCU and is grateful for any facility that is able to take him.  Per pt, he can stay in [place] with some friends at a house following discharge.  [facility] sent referrals and will continue to follow/assist.    Update:  [facility] declined pt.    Lili Tovar, [facility] HospitalSW

## 2021-09-15 NOTE — PROGRESS NOTES
"General Surgery Progress Note    Assessment and Plan:  50 year old male S/P Procedure(s):  INCISION AND DRAINAGE, POSTERIOR NECK, 1 Day Post-Op.    - Continue packing changes twice daily  - WBC 21.7-->14.2, continue Zosyn and vanco  - Med mngt per hospitalist, appreciate your help  - Seen with Dr. [place] Hx:   Mild tachycardia, in 90s now, intermittent HTN, denies increased pain, tolerating diet, +Flatus/-BM, UO adequate, ambulating.  Meds reviewed.    Exam:  Vitals: Blood pressure 139/85, pulse 96, temperature 97.5  F (36.4  C), temperature source Axillary, resp. rate 16, height 1.88 m (6' 2\"), weight 113.4 kg (250 lb), SpO2 95 %.  Temperature Temp  Av.2  F (36.8  C)  Min: 97.5  F (36.4  C)  Max: 98.7  F (37.1  C)   I/O last 3 completed shifts:  In: 2796.66 [P.O.:440; I.V.:2356.66]  Out: -     Gen: Awake and in no apparent distress.  Wound: Open, clean, moderate surrounding erythema and induration, minimal purulent drainage on packing.    Data:  Recent Labs   Lab Test 09/15/21  0911 21  0442 21  0917   WBC 14.2* 21.7* 17.9*   HGB 10.8* 12.2* 12.2*   HCT 33.3* 36.3* 36.6*    289 286          Felicia Lucero PA-C  [facility]  [phone]  "

## 2021-09-15 NOTE — PROGRESS NOTES
[facility]    Medicine Progress Note - Hospitalist Service       Date of Admission:  9/12/2021    Assessment & Plan           Chris Shea is a 50 year old male with hx of poorly-controlled DM2, chronic right foot ulcer, and methamphetamine abuse who was admitted on 9/12/2021 for sepsis due to a large neck abscess     Posterior neck cellulitis  Presented with 2 day history of progressive neck pain, redness, and swelling with purulent drainage after picking at his skin. On arrival, he was noted to be tachycardic with leukocytosis to 16.4k. In the ED, he was initiated on IV vancomycin and pip-tazo.  - Continuing with vancomycin and pip-tazo  - CT neck showed Ill-defined, moderately extensive inflammatory process involving the posterior upper cervical and occipitocervical soft tissues. No evidence for discrete drainable abscess, bone involvement or deep intraspinal extension.  - General Surgery consulted and s/p I&D 9/14, operative cultures sent. Follow.   - ID consult appreciated, continuing with broad spectrum abx as above.  - 9/12 blood cultures x2 NGTD  -follow leucocytosis, AM labs pending.      Chronic right mid-foot ulcer  2 cm ulcer over plantar aspect of right mid-foot. States he follows at wound clinic through [facility].   - WOCN consult  - Podiatry consulted,s /p excisional debridement of R foot ulcer 9/14      Methamphetamine abuse  Admits to using methamphetamine twice weekly. Last used: 9/10. Denies IVDU. Was  diaphoretic, likely in early withdrawal.  - Psychiatry consulted-they feel he is not withdrawing and has not used frequently.  As needed hydroxyzine ordered, clonidine or benzodiazepine was not recommended.     DM2 with peripheral vascular disease, uncontrolled  BG>400 on arrival. 6/24/21 A1c 13.8. PTA on metformin 1000 mg BID  - Holding PTA metformin  - Started Lantus 20 units at bedtime and premeal novolog added per carb counting.   -Blood sugars still in  200s, resume PTA Metformin at 500 mg twice daily.  Increase Premeal NovoLog, 1 unit per 10 g carbohydrate.  - High SSI available  - discussed with patient regarding insulin at discharge, he is open to it though does not prefer. Nursing to teach how to inject insulin meanwhile.     Diet: Advance Diet as Tolerated: Regular Diet Adult; 6246-5373 Calories: Moderate Consistent CHO (4-6 CHO units/meal)    DVT Prophylaxis: Pneumatic Compression Devices  Tam Catheter: Not present  Central Lines: None  Code Status: Full Code      Disposition Plan   Expected discharge: 09/17/2021   recommended to unclear - will need to further evaluate after I&D and follow operative cultures.      The patient's care was discussed with the Bedside Nurse and Patient.    Luisa Barfield MD  Hospitalist Service  [facility]  Securely message with the Vocera Web Console (learn more here)  Text page via [employer] Paging/Directory      Clinically Significant Risk Factors Present on Admission                ______________________________________________________________________    Interval History   Patient was evaluated this morning, reports pain over the posterior neck is improved since I&D yesterday.  -Denies cough, dyspnea, chest pain, abdominal pain, diarrhea.    Data reviewed today: I reviewed all medications, new labs results over the last 24 hours. I personally reviewed no images or EKG's today.    Physical Exam   Vital Signs: Temp: 97.5  F (36.4  C) Temp src: Axillary BP: 125/89 Pulse: 96   Resp: 16 SpO2: 95 % O2 Device: None (Room air) Oxygen Delivery: 1 LPM  Weight: 250 lbs 0 oz    Gen: NAD, pleasant  HEENT: PERRLA EOMI. I&D'ed wound in nape of neck with packing, dressing is soaked with serous drainage, surrounding edema and erythema seems to have improved.    Resp: CTA b/l, no dyspnea.   CV: S1S2 regular, no murmur. No tachycardia.   Abdo: soft, nontender, nondistended, bowel sounds present  Ext: calves nontender,  well perfused. Rt foot I&D'ed wound not examined.   Neuro: AAOx3, CN grossly intact, no facial asymmetry      Data   Recent Labs   Lab 09/15/21  0738 09/15/21  0229 09/14/21  2134 09/14/21  0636 09/14/21  0442 09/13/21  1253 09/13/21  0917 09/12/21 2057 09/12/21  1820   WBC  --   --   --   --  21.7*  --  17.9*  --  16.4*   HGB  --   --   --   --  12.2*  --  12.2*  --  13.2*   MCV  --   --   --   --  82  --  82  --  83   PLT  --   --   --   --  289  --  286  --  322   NA  --   --   --   --   --   --  135  --  133   POTASSIUM  --   --   --   --  3.8  --  4.2  --  4.3   CHLORIDE  --   --   --   --   --   --  102  --  97   CO2  --   --   --   --   --   --  24  --  26   BUN  --   --   --   --   --   --  15  --  17   CR  --   --   --   --   --   --  1.15  --  1.24   ANIONGAP  --   --   --   --   --   --  9  --  10   ELINOR  --   --   --   --   --   --  8.5  --  9.1   * 248* 267*   < >  --    < > 314*   < > 430*   ALBUMIN  --   --   --   --   --   --   --   --  2.6*   PROTTOTAL  --   --   --   --   --   --   --   --  7.9   BILITOTAL  --   --   --   --   --   --   --   --  0.6   ALKPHOS  --   --   --   --   --   --   --   --  178*   ALT  --   --   --   --   --   --   --   --  13   AST  --   --   --   --   --   --   --   --  7    < > = values in this interval not displayed.     No results found for this or any previous visit (from the past 24 hour(s)).

## 2021-09-16 LAB
ANION GAP SERPL CALCULATED.3IONS-SCNC: 6 MMOL/L (ref 3–14)
BACTERIA WND CULT: ABNORMAL
BASOPHILS # BLD MANUAL: 0.2 10E3/UL (ref 0–0.2)
BASOPHILS NFR BLD MANUAL: 2 %
BUN SERPL-MCNC: 10 MG/DL (ref 7–30)
CALCIUM SERPL-MCNC: 9.1 MG/DL (ref 8.5–10.1)
CHLORIDE BLD-SCNC: 102 MMOL/L (ref 94–109)
CO2 SERPL-SCNC: 31 MMOL/L (ref 20–32)
CREAT SERPL-MCNC: 1.14 MG/DL (ref 0.66–1.25)
EOSINOPHIL # BLD MANUAL: 0.3 10E3/UL (ref 0–0.7)
EOSINOPHIL NFR BLD MANUAL: 3 %
ERYTHROCYTE [DISTWIDTH] IN BLOOD BY AUTOMATED COUNT: 12.9 % (ref 10–15)
GFR SERPL CREATININE-BSD FRML MDRD: 74 ML/MIN/1.73M2
GLUCOSE BLD-MCNC: 155 MG/DL (ref 70–99)
GLUCOSE BLDC GLUCOMTR-MCNC: 113 MG/DL (ref 70–99)
GLUCOSE BLDC GLUCOMTR-MCNC: 125 MG/DL (ref 70–99)
GLUCOSE BLDC GLUCOMTR-MCNC: 125 MG/DL (ref 70–99)
GLUCOSE BLDC GLUCOMTR-MCNC: 129 MG/DL (ref 70–99)
GLUCOSE BLDC GLUCOMTR-MCNC: 131 MG/DL (ref 70–99)
GLUCOSE BLDC GLUCOMTR-MCNC: 150 MG/DL (ref 70–99)
HCT VFR BLD AUTO: 38 % (ref 40–53)
HGB BLD-MCNC: 12.2 G/DL (ref 13.3–17.7)
LYMPHOCYTES # BLD MANUAL: 2.4 10E3/UL (ref 0.8–5.3)
LYMPHOCYTES NFR BLD MANUAL: 25 %
MAGNESIUM SERPL-MCNC: 1.7 MG/DL (ref 1.6–2.3)
MCH RBC QN AUTO: 27.2 PG (ref 26.5–33)
MCHC RBC AUTO-ENTMCNC: 32.1 G/DL (ref 31.5–36.5)
MCV RBC AUTO: 85 FL (ref 78–100)
MONOCYTES # BLD MANUAL: 0.8 10E3/UL (ref 0–1.3)
MONOCYTES NFR BLD MANUAL: 8 %
MYELOCYTES # BLD MANUAL: 0.1 10E3/UL
MYELOCYTES NFR BLD MANUAL: 1 %
NEUTROPHILS # BLD MANUAL: 5.9 10E3/UL (ref 1.6–8.3)
NEUTROPHILS NFR BLD MANUAL: 61 %
PLAT MORPH BLD: ABNORMAL
PLATELET # BLD AUTO: 304 10E3/UL (ref 150–450)
POTASSIUM BLD-SCNC: 3.7 MMOL/L (ref 3.4–5.3)
RBC # BLD AUTO: 4.49 10E6/UL (ref 4.4–5.9)
RBC MORPH BLD: ABNORMAL
SODIUM SERPL-SCNC: 139 MMOL/L (ref 133–144)
WBC # BLD AUTO: 9.6 10E3/UL (ref 4–11)

## 2021-09-16 PROCEDURE — 250N000013 HC RX MED GY IP 250 OP 250 PS 637: Performed by: PHYSICIAN ASSISTANT

## 2021-09-16 PROCEDURE — 83735 ASSAY OF MAGNESIUM: CPT | Performed by: HOSPITALIST

## 2021-09-16 PROCEDURE — 99232 SBSQ HOSP IP/OBS MODERATE 35: CPT | Performed by: HOSPITALIST

## 2021-09-16 PROCEDURE — 250N000012 HC RX MED GY IP 250 OP 636 PS 637: Performed by: HOSPITALIST

## 2021-09-16 PROCEDURE — 85027 COMPLETE CBC AUTOMATED: CPT | Performed by: HOSPITALIST

## 2021-09-16 PROCEDURE — 36415 COLL VENOUS BLD VENIPUNCTURE: CPT | Performed by: HOSPITALIST

## 2021-09-16 PROCEDURE — 80048 BASIC METABOLIC PNL TOTAL CA: CPT | Performed by: HOSPITALIST

## 2021-09-16 PROCEDURE — 250N000011 HC RX IP 250 OP 636: Performed by: INTERNAL MEDICINE

## 2021-09-16 PROCEDURE — 120N000001 HC R&B MED SURG/OB

## 2021-09-16 PROCEDURE — 250N000013 HC RX MED GY IP 250 OP 250 PS 637: Performed by: HOSPITALIST

## 2021-09-16 PROCEDURE — 250N000011 HC RX IP 250 OP 636: Performed by: PHYSICIAN ASSISTANT

## 2021-09-16 PROCEDURE — 258N000003 HC RX IP 258 OP 636: Performed by: INTERNAL MEDICINE

## 2021-09-16 RX ORDER — BLOOD PRESSURE TEST KIT
KIT MISCELLANEOUS
Qty: 100 EACH | Refills: 0 | Status: SHIPPED | OUTPATIENT
Start: 2021-09-16

## 2021-09-16 RX ORDER — CONTAINER,EMPTY
EACH MISCELLANEOUS
Qty: 1 EACH | Refills: 0 | Status: SHIPPED | OUTPATIENT
Start: 2021-09-16

## 2021-09-16 RX ADMIN — VANCOMYCIN HYDROCHLORIDE 1250 MG: 5 INJECTION, POWDER, LYOPHILIZED, FOR SOLUTION INTRAVENOUS at 21:14

## 2021-09-16 RX ADMIN — ACETAMINOPHEN 650 MG: 325 TABLET, FILM COATED ORAL at 04:12

## 2021-09-16 RX ADMIN — PIPERACILLIN SODIUM AND TAZOBACTAM SODIUM 3.38 G: 3; .375 INJECTION, POWDER, LYOPHILIZED, FOR SOLUTION INTRAVENOUS at 05:45

## 2021-09-16 RX ADMIN — ACETAMINOPHEN 650 MG: 325 TABLET, FILM COATED ORAL at 10:08

## 2021-09-16 RX ADMIN — OXYCODONE HYDROCHLORIDE 10 MG: 5 TABLET ORAL at 10:08

## 2021-09-16 RX ADMIN — HYDROMORPHONE HYDROCHLORIDE 0.5 MG: 1 INJECTION, SOLUTION INTRAMUSCULAR; INTRAVENOUS; SUBCUTANEOUS at 04:31

## 2021-09-16 RX ADMIN — METFORMIN HYDROCHLORIDE 1000 MG: 500 TABLET, FILM COATED ORAL at 09:56

## 2021-09-16 RX ADMIN — INSULIN GLARGINE 25 UNITS: 100 INJECTION, SOLUTION SUBCUTANEOUS at 21:14

## 2021-09-16 RX ADMIN — ACETAMINOPHEN 650 MG: 325 TABLET, FILM COATED ORAL at 16:50

## 2021-09-16 RX ADMIN — METFORMIN HYDROCHLORIDE 1000 MG: 500 TABLET, FILM COATED ORAL at 19:02

## 2021-09-16 RX ADMIN — OXYCODONE HYDROCHLORIDE 10 MG: 5 TABLET ORAL at 15:12

## 2021-09-16 RX ADMIN — VANCOMYCIN HYDROCHLORIDE 1250 MG: 5 INJECTION, POWDER, LYOPHILIZED, FOR SOLUTION INTRAVENOUS at 09:56

## 2021-09-16 ASSESSMENT — ACTIVITIES OF DAILY LIVING (ADL)
ADLS_ACUITY_SCORE: 5
ADLS_ACUITY_SCORE: 16
ADLS_ACUITY_SCORE: 5
ADLS_ACUITY_SCORE: 16
ADLS_ACUITY_SCORE: 5
ADLS_ACUITY_SCORE: 16

## 2021-09-16 NOTE — PROVIDER NOTIFICATION
MD Notification    Notified Person: MD    Notified Person Name: Prachi    Notification Date/Time: 9/16/21, 0322    Notification Interaction: amcom page    Purpose of Notification: FYI, pt neck wound culture MRSA+. Pt already receiving Vanco and Zosyn.    Orders Received:    Comments:

## 2021-09-16 NOTE — PLAN OF CARE
Cognitive Concerns/ Orientation : A&O x4  BEHAVIOR & AGGRESSION TOOL COLOR: Green   ABNL VS/O2: VSS on RA  MOBILITY: Independent, ortho shoe when ambulating.   PAIN MANAGMENT: Pain at R neck abcess/I&D site, managed with Tylenol, oxycodone.   DIET: Moderate carbohydrate, tolerating, fair appetite.   BOWEL/BLADDER: continent, up to bathroom; BM x1 today.   ABNL LAB/BG: , 113, 131. K and Mag protocols, WNL, rechecks in am.   DRAIN/DEVICES: PIV x2, saline locked.   TELEMETRY RHYTHM: n/a  SKIN: Abscess/I&D site on R side of neck, dressing changed per surgery this am, CDI. Wound to R heel, wound care completed as ordered, new dressing CDI. Ortho shoe when ambulating.   TESTS/PROCEDURES: POD2 I&D of R neck abscess.   D/C DATE: pending, TCU placement.   OTHER IMPORTANT INFO: Surgery and WOC following. ID now signed off. Diabetic education started, pt administered own insulin, has glucometer in room.

## 2021-09-16 NOTE — PROGRESS NOTES
[facility]    Infectious Disease Progress Note    Date of Service (when I saw the patient): 09/16/2021     Assessment & Plan   Chris Shea is a 50 year old male who was admitted on 9/12/2021.     Impression:  1. 50 y.o male with diabetes.   2. Methamphetamine use.   3. Chronic right foot ulcer.   4. Admitted on 9/12/2021 for sepsis due to a large neck abscess  5. On vancomycin and pip-tazo  6. s/p I&D cultures pending.   5. Pending blood cultures.         Recommendations:   MRSA in the cultures, stop zosyn continue on vanco alone.   Fortunately blood cultures stay negative.   When ready for discharge can be switched to oral bactrim another 10 days or so     Will sign off please call if ques       Lexis Miller MD    Interval History   S/p I and D   Cultures from the OR pending   WBC up   No fever     Physical Exam   Temp: 98  F (36.7  C) Temp src: Oral BP: (!) 148/100 Pulse: 87   Resp: 18 SpO2: 96 % O2 Device: None (Room air)    Vitals:    09/12/21 1817   Weight: 113.4 kg (250 lb)     Vital Signs with Ranges  Temp:  [97.9  F (36.6  C)-98  F (36.7  C)] 98  F (36.7  C)  Pulse:  [87-92] 87  Resp:  [16-18] 18  BP: (139-148)/() 148/100  SpO2:  [95 %-97 %] 96 %    Constitutional: Awake, alert, cooperative, no apparent distress  Lungs: Clear to auscultation bilaterally, no crackles or wheezing  Cardiovascular: Regular rate and rhythm, normal S1 and S2, and no murmur noted  Abdomen: Normal bowel sounds, soft, non-distended, non-tender  Skin: No rashes, no cyanosis, no edema  Other:    Medications     Another Antibiotic has been ordered.         insulin aspart   Subcutaneous TID AC     insulin aspart  1-10 Units Subcutaneous TID AC     insulin aspart  1-7 Units Subcutaneous At Bedtime     insulin glargine  25 Units Subcutaneous QPM     metFORMIN  1,000 mg Oral BID w/meals     sodium chloride (PF)  3 mL Intracatheter Q8H     vancomycin  1,250 mg Intravenous Q12H       Data   All  microbiology laboratory data reviewed.  Recent Labs   Lab Test 09/16/21  0927 09/15/21  0911 09/14/21  0442   WBC 9.6 14.2* 21.7*   HGB 12.2* 10.8* 12.2*   HCT 38.0* 33.3* 36.3*   MCV 85 85 82    264 289     Recent Labs   Lab Test 09/16/21  0927 09/15/21  0911 09/13/21  0917   CR 1.14 1.11 1.15     No lab results found.  No lab results found.    Invalid input(s): UC

## 2021-09-16 NOTE — PROGRESS NOTES
"SPIRITUAL HEALTH SERVICES  SPIRITUAL ASSESSMENT Progress Note  [facility]     REFERRAL SOURCE: Length of Stay    Reviewed documentation. Reflective conversation shared with Chris which integrated elements of illness and family narratives.     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  Chris expressed his gratitude for the wonderful care that he has received from the staff.     I offered spiritual and emotional support through reflective listening that affirmed emotions, experience, and meaning.     PLAN: [facility] remains available for support.    Felicia Roe  Associate   Pager [phone]  Phone [phone]    "

## 2021-09-16 NOTE — PROGRESS NOTES
"General Surgery Progress Note    Assessment and Plan:  50 year old male S/P Procedure(s):  INCISION AND DRAINAGE, POSTERIOR NECK, 2 Days Post-Op.     - Continue packing changes twice daily while inpatient, change to daily at home  - WBC 14.2 yesterday, +MRSA, continue Zosyn and vanco, ID following  - Med mngt per hospitalist, appreciate your help  - Discharge when medically able, wound care and follow up instructions on chart, encouraged to control sugars  - Please call with any questions    Interval Hx:   Hypertensive at times, denies increased pain, tolerating diet, +Flatus/BM, UO adequate, ambulating.  Meds reviewed.    Exam:  Vitals: Blood pressure (!) 148/100, pulse 87, temperature 98  F (36.7  C), temperature source Oral, resp. rate 18, height 1.88 m (6' 2\"), weight 113.4 kg (250 lb), SpO2 96 %.  Temperature Temp  Av.9  F (36.6  C)  Min: 97.9  F (36.6  C)  Max: 98  F (36.7  C)   I/O last 3 completed shifts:  In: 240 [P.O.:240]  Out: 1325 [Urine:1325]    Gen: Awake and in no apparent distress.  Wound: Open, clean, mild surrounding erythema and induration, less purulent drainage on packing and less tender..    Data:  No new labs/imaging.   Recent Labs   Lab Test 09/15/21  0911 21  0442 21  0917   WBC 14.2* 21.7* 17.9*   HGB 10.8* 12.2* 12.2*   HCT 33.3* 36.3* 36.6*    289 286          Felicia Lucero PA-C  [facility]  [phone]  "

## 2021-09-16 NOTE — PLAN OF CARE
DATE & TIME: 09/15/2021 9737-4882  Cognitive Concerns/ Orientation : Alert/oriented x 4   BEHAVIOR & AGGRESSION TOOL COLOR: Green   ABNL VS/O2: VSS, room air  MOBILITY: Assist-1 with IV pole  PAIN MANAGMENT: tylenol given x1, oxycodone x1, dilaudid x1 for neck pain  DIET: Moderate carbohydrate with carb count   BOWEL/BLADDER: continent  ABNL LAB/BG: WBC 14.2, hgb 10.8;  , 125  DRAIN/DEVICES: L PIV; R PIV saline locked; IV Zosyn (Q6hrs), IV Vanco (Q12hrs)  TELEMETRY RHYTHM:none  SKIN: I&D 9/14 on Abscess on nape of neck, serosanguinous drainage. Dressing changed;  R foot dressing changed  (orthotic shoe)  TESTS/PROCEDURES: n/a  D/C DATE: Neck wound cultures MRSA+, ID surgery woc following.possible discharge to TCU, SW following.

## 2021-09-17 LAB
BACTERIA BLD CULT: NO GROWTH
BACTERIA BLD CULT: NO GROWTH
CREAT SERPL-MCNC: 1.01 MG/DL (ref 0.66–1.25)
GFR SERPL CREATININE-BSD FRML MDRD: 85 ML/MIN/1.73M2
GLUCOSE BLDC GLUCOMTR-MCNC: 136 MG/DL (ref 70–99)
GLUCOSE BLDC GLUCOMTR-MCNC: 142 MG/DL (ref 70–99)
GLUCOSE BLDC GLUCOMTR-MCNC: 199 MG/DL (ref 70–99)
GLUCOSE BLDC GLUCOMTR-MCNC: 202 MG/DL (ref 70–99)
GLUCOSE BLDC GLUCOMTR-MCNC: 206 MG/DL (ref 70–99)
MAGNESIUM SERPL-MCNC: 1.6 MG/DL (ref 1.6–2.3)
POTASSIUM BLD-SCNC: 3.8 MMOL/L (ref 3.4–5.3)
VANCOMYCIN SERPL-MCNC: 11.1 MG/L

## 2021-09-17 PROCEDURE — 250N000011 HC RX IP 250 OP 636: Performed by: PHYSICIAN ASSISTANT

## 2021-09-17 PROCEDURE — 250N000011 HC RX IP 250 OP 636: Performed by: INTERNAL MEDICINE

## 2021-09-17 PROCEDURE — 80202 ASSAY OF VANCOMYCIN: CPT

## 2021-09-17 PROCEDURE — 83735 ASSAY OF MAGNESIUM: CPT | Performed by: HOSPITALIST

## 2021-09-17 PROCEDURE — 36415 COLL VENOUS BLD VENIPUNCTURE: CPT

## 2021-09-17 PROCEDURE — 99232 SBSQ HOSP IP/OBS MODERATE 35: CPT | Performed by: HOSPITALIST

## 2021-09-17 PROCEDURE — 84132 ASSAY OF SERUM POTASSIUM: CPT | Performed by: HOSPITALIST

## 2021-09-17 PROCEDURE — 250N000013 HC RX MED GY IP 250 OP 250 PS 637: Performed by: PHYSICIAN ASSISTANT

## 2021-09-17 PROCEDURE — 258N000003 HC RX IP 258 OP 636: Performed by: INTERNAL MEDICINE

## 2021-09-17 PROCEDURE — 120N000001 HC R&B MED SURG/OB

## 2021-09-17 PROCEDURE — 250N000013 HC RX MED GY IP 250 OP 250 PS 637: Performed by: HOSPITALIST

## 2021-09-17 PROCEDURE — 250N000012 HC RX MED GY IP 250 OP 636 PS 637: Performed by: HOSPITALIST

## 2021-09-17 PROCEDURE — 36415 COLL VENOUS BLD VENIPUNCTURE: CPT | Performed by: INTERNAL MEDICINE

## 2021-09-17 PROCEDURE — 82565 ASSAY OF CREATININE: CPT | Performed by: INTERNAL MEDICINE

## 2021-09-17 RX ORDER — HYDROXYZINE HYDROCHLORIDE 25 MG/1
25 TABLET, FILM COATED ORAL 3 TIMES DAILY PRN
DISCHARGE
Start: 2021-09-17 | End: 2021-09-23

## 2021-09-17 RX ORDER — OXYCODONE HYDROCHLORIDE 5 MG/1
5 TABLET ORAL 3 TIMES DAILY PRN
Qty: 21 TABLET | Status: SHIPPED | DISCHARGE
Start: 2021-09-17 | End: 2021-09-23

## 2021-09-17 RX ORDER — ACETAMINOPHEN 500 MG
1000 TABLET ORAL EVERY 8 HOURS PRN
DISCHARGE
Start: 2021-09-17 | End: 2021-09-23

## 2021-09-17 RX ORDER — AMOXICILLIN 250 MG
1 CAPSULE ORAL 2 TIMES DAILY PRN
DISCHARGE
Start: 2021-09-17 | End: 2021-09-23

## 2021-09-17 RX ORDER — INSULIN ASPART 100 [IU]/ML
6 INJECTION, SOLUTION INTRAVENOUS; SUBCUTANEOUS
Qty: 15 ML | Refills: 0 | DISCHARGE
Start: 2021-09-17 | End: 2021-09-23

## 2021-09-17 RX ORDER — LISINOPRIL 5 MG/1
5 TABLET ORAL DAILY
DISCHARGE
Start: 2021-09-17 | End: 2021-09-23

## 2021-09-17 RX ORDER — SULFAMETHOXAZOLE/TRIMETHOPRIM 800-160 MG
1 TABLET ORAL 2 TIMES DAILY
Qty: 20 TABLET | DISCHARGE
Start: 2021-09-17 | End: 2021-09-23

## 2021-09-17 RX ORDER — INSULIN ASPART 100 [IU]/ML
INJECTION, SOLUTION INTRAVENOUS; SUBCUTANEOUS
Qty: 15 ML | Refills: 0 | DISCHARGE
Start: 2021-09-17 | End: 2021-09-23

## 2021-09-17 RX ORDER — LISINOPRIL 5 MG/1
5 TABLET ORAL DAILY
Status: DISCONTINUED | OUTPATIENT
Start: 2021-09-17 | End: 2021-09-19

## 2021-09-17 RX ADMIN — VANCOMYCIN HYDROCHLORIDE 1500 MG: 5 INJECTION, POWDER, LYOPHILIZED, FOR SOLUTION INTRAVENOUS at 22:08

## 2021-09-17 RX ADMIN — ACETAMINOPHEN 650 MG: 325 TABLET, FILM COATED ORAL at 01:51

## 2021-09-17 RX ADMIN — HYDROMORPHONE HYDROCHLORIDE 0.5 MG: 1 INJECTION, SOLUTION INTRAMUSCULAR; INTRAVENOUS; SUBCUTANEOUS at 22:13

## 2021-09-17 RX ADMIN — OXYCODONE HYDROCHLORIDE 5 MG: 5 TABLET ORAL at 01:51

## 2021-09-17 RX ADMIN — ACETAMINOPHEN 650 MG: 325 TABLET, FILM COATED ORAL at 13:50

## 2021-09-17 RX ADMIN — METFORMIN HYDROCHLORIDE 1000 MG: 500 TABLET, FILM COATED ORAL at 17:07

## 2021-09-17 RX ADMIN — OXYCODONE HYDROCHLORIDE 5 MG: 5 TABLET ORAL at 13:50

## 2021-09-17 RX ADMIN — LISINOPRIL 5 MG: 5 TABLET ORAL at 17:07

## 2021-09-17 RX ADMIN — INSULIN GLARGINE 25 UNITS: 100 INJECTION, SOLUTION SUBCUTANEOUS at 22:12

## 2021-09-17 RX ADMIN — METFORMIN HYDROCHLORIDE 1000 MG: 500 TABLET, FILM COATED ORAL at 08:37

## 2021-09-17 RX ADMIN — VANCOMYCIN HYDROCHLORIDE 1250 MG: 5 INJECTION, POWDER, LYOPHILIZED, FOR SOLUTION INTRAVENOUS at 08:46

## 2021-09-17 RX ADMIN — OXYCODONE HYDROCHLORIDE 5 MG: 5 TABLET ORAL at 08:37

## 2021-09-17 RX ADMIN — INSULIN ASPART 3 UNITS: 100 INJECTION, SOLUTION INTRAVENOUS; SUBCUTANEOUS at 10:22

## 2021-09-17 RX ADMIN — ACETAMINOPHEN 650 MG: 325 TABLET, FILM COATED ORAL at 08:46

## 2021-09-17 RX ADMIN — HYDROMORPHONE HYDROCHLORIDE 0.5 MG: 1 INJECTION, SOLUTION INTRAMUSCULAR; INTRAVENOUS; SUBCUTANEOUS at 03:55

## 2021-09-17 ASSESSMENT — ACTIVITIES OF DAILY LIVING (ADL)
ADLS_ACUITY_SCORE: 5

## 2021-09-17 NOTE — PROGRESS NOTES
Care Management Follow Up    Length of Stay (days): 5    Expected Discharge Date: 09/18/2021     Concerns to be Addressed: discharge planning     Patient plan of care discussed at interdisciplinary rounds: Yes    Anticipated Discharge Disposition: Transitional Care     Anticipated Discharge Services:    Anticipated Discharge DME:      Patient/family educated on Medicare website which has current facility and service quality ratings: no  Education Provided on the Discharge Plan:    Patient/Family in Agreement with the Plan: yes    Referrals Placed by CM/SW: Post Acute Facilities  Private pay costs discussed: Not applicable    Additional Information:  Aware pt is ready for discharge to TCU. Referrals sent. Pt globally denied by [facility]. No call back from [employer] [facility], [facility] or [facility]. [facility] assessing.       JORDAN Escalona, [facility]  [phone]  [facility]    Addendum: [facility] declined pt due to recent methamphetamine use. This will most likely be a barrier to TCU placement.

## 2021-09-17 NOTE — DISCHARGE SUMMARY
[facility]  Hospitalist Discharge Summary      Date of Admission:  9/12/2021  Date of Discharge:  9/17/2021  Discharging Provider: Lusia Barfield MD      Discharge Diagnoses     Posterior neck cellulitis and MRSA abscess    Uncontrolled DM2 with peripheral vascular disease     Essential HTN    See below for additional chronic medical conditions    Follow-ups Needed After Discharge   Follow-up Appointments     Follow Up      Follow up with Dr. Dodson for your neck wound in 2 weeks. Call   [phone] to schedule an appointment or if you have any concerns. We   are located at [address] [place] [address] [address], [place] [address].       Follow-up with [facility] where he is established   versus follow-up with [facility] podiatry in 3 to 4 weeks.    *PLEASE CALL [phone] TO SCHEDULE*  Location openings are subject to change due to COVID-19  --------------------------------------------------------------------------  DR. DANETTE LANIER  [place]  [address]  [place] [address] [phone]    [facility]  [address]  [place], [place] [id number] [phone]  --------------------------------------------------------------------------  DR. SHAYY ARZOLA  [address] [place], [place]     [phone]        [place]  [address]  [place] [address]    --------------------------------------------------------------------------      DEMETRICE Fitch     [phone]     [id number]  ---------------------------------------------------------------------------    DR. ELAINE LÓPEZ  [place]  [address]  [place] [address]  [phone]         Follow Up and recommended labs and tests      Follow up with [facility].  The following labs/tests are   recommended: BMP in 5 days.               Unresulted Labs Ordered in the Past 30 Days of this Admission     Date and Time Order Name Status  Description    9/14/2021  8:47 AM Anaerobic Bacterial Culture Routine Preliminary     9/12/2021  6:20 PM Blood Culture Peripheral Blood Preliminary     9/12/2021  6:20 PM Blood Culture Peripheral Blood Preliminary       These results will be followed up by hospitalist/TCU MD    Discharge Disposition   Discharged to TCU  Condition at discharge: Stable     Hospital Course              Chris Shea is a 50 year old male with hx of poorly-controlled DM2, chronic right foot ulcer, and methamphetamine abuse who was admitted on 9/12/2021 for sepsis due to a large neck abscess     Posterior neck cellulitis and MRSA abscess  Presented with 2 day history of progressive neck pain, redness, and swelling with purulent drainage after picking at his skin. On arrival, he was noted to be tachycardic with leukocytosis to 16.4k. In the ED, he was initiated on IV vancomycin and pip-tazo.    - CT neck showed Ill-defined, moderately extensive inflammatory process involving the posterior upper cervical and occipitocervical soft tissues. No evidence for discrete drainable abscess, bone involvement or deep intraspinal extension.  - General Surgery consulted and s/p I&D 9/14, operative cultures grew MRSA.  Wound cares per surgery  - ID consult appreciated, IV vancomycin and pip-tazo continued initially, Zosyn was discontinued once MRSA identified on operative culture. Discharging on 10 more days of PO bactrim  - 9/12 blood cultures x2 NGTD  - Leukocytosis has resolved      Chronic right mid-foot ulcer  2 cm ulcer over plantar aspect of right mid-foot. States he follows at wound clinic through [facility].   - WOCN consult  - Podiatry consulted,s /p excisional debridement of R foot ulcer 9/14      Methamphetamine abuse  Admits to using methamphetamine twice weekly. Last used: 9/10. Denies IVDU. Was  diaphoretic, likely in early withdrawal.  - Psychiatry consulted- felt not in withdrawal. As needed hydroxyzine ordered, and  clonidine or benzodiazepine was not recommended.     DM2 with peripheral vascular disease, uncontrolled  BG>400 on arrival. 6/24/21 A1c 13.8. PTA on metformin 1000 mg BID  -  PTA metformin was held on admission  - Started Lantus 20 units at bedtime and premeal novolog added per carb counting.   - PTA Metformin resumed at 500 mg twice daily.  Premeal NovoLog to 1 unit per 10 g carbohydrate.  - High SSI was used while in hospital.  - Discussed with patient regarding insulin at discharge given markedly elevated A1C of 13.8. Patient agreed for insulin. He was taught how to manage insulin and supplies were provided. He feels comfortable using it now. Going to TCU, recommend to continue to teach insulin. Regimen was simplified to fixed dose premeal novolog instead of carb count based along iwth basal lantus and PTA metfomin.      Consultations This Hospital Stay   PHARMACY TO DOSE VANCO  SURGERY GENERAL IP CONSULT  WOUND OSTOMY CONTINENCE NURSE  IP CONSULT  PHARMACY TO DOSE VANCO  INFECTIOUS DISEASES IP CONSULT  PODIATRY IP CONSULT  PSYCHIATRY IP CONSULT  ORTHOSIS EXTREMITY LOWER REFERRAL IP CONSULT  WOUND OSTOMY CONTINENCE NURSE  IP CONSULT  PHARMACY TO DOSE VANCO  CARE MANAGEMENT / SOCIAL WORK IP CONSULT    Code Status   Full Code    Time Spent on this Encounter   I, Luisa Barfield MD, personally saw the patient today and spent greater than 30 minutes discharging this patient.       Luisa Barfield MD  [place] [address] [address] [address]  [facility] [address]  [place] [address]  Phone: [phone]  ______________________________________________________________________    Physical Exam   Vital Signs: Temp: 97.9  F (36.6  C) Temp src: Oral BP: (!) 156/101 Pulse: 88   Resp: 18 SpO2: 94 % O2 Device: None (Room air)    Weight: 250 lbs 0 oz    Gen: NAD, pleasant  HEENT: PERRLA EOMI. I&D'ed wound in nape of neck with packing, dressing is soaked with serous drainage, surrounding edema and erythema has  markedly improved.    Resp: CTA b/l, no dyspnea.   CV: S1S2 regular, no murmur. No tachycardia.   Abdo: soft, nontender, nondistended, bowel sounds present  Ext: calves nontender, well perfused. Rt foot I&D'ed wound not examined.   Neuro: AAOx3, CN grossly intact, no facial asymmetry       Primary Care Physician   Sang Edmondson    Discharge Orders      WOUND CARE SUPPLIES    Saline, gauze, fluffs, q tip applicator, abd and tape     Activity    Activity as tolerated in the DH2/ off loading shoe     Dressing    Wound care for posterior neck: Remove packing and shower, do not soak in a tub. You may also use saline or Microkleenz to cleanse wound as needed. Use the q tip applicator to insert gauze or fluff into the wound clockwise, and apply gauze or abd and tape over the wound. Packing changes daily.     Wound cares and dressing choice per the recommendations of the [facility] RN team.     Follow Up    Follow up with Dr. Dodson for your neck wound in 2 weeks. Call [phone] to schedule an appointment or if you have any concerns. We are located at [address] [place] [address].       Follow-up with [facility] where he is established versus follow-up with [facility] podiatry in 3 to 4 weeks.    *PLEASE CALL [phone] TO SCHEDULE*  Location openings are subject to change due to COVID-19  --------------------------------------------------------------------------  DR. DANETTE LANIER  [place]  [address]  [place] [address] [phone]    [facility]  [address]  [place] [address] [phone]  --------------------------------------------------------------------------  DR. SHAYY ARZOLA  [address]       DEMETRICE Fitch     [phone]        [place]  [address]  [place] [address]    --------------------------------------------------------------------------      [place]  [place]     [phone]     [id number]  ---------------------------------------------------------------------------  DR. ELAINE LÓPEZ  [facility]  [address]  [place] [address]  [phone]     General info for SNF    Length of Stay Estimate: Short Term Care: Estimated # of Days <30  Condition at Discharge: Improving  Level of care:skilled   Rehabilitation Potential: Good  Admission H&P remains valid and up-to-date: Yes  Recent Chemotherapy: N/A  Use Nursing Home Standing Orders: Yes     Mantoux instructions    Give two-step Mantoux (PPD) Per Facility Policy Yes     Follow Up and recommended labs and tests    Follow up with custodial physician.  The following labs/tests are recommended: BMP in 5 days.     Reason for your hospital stay    MRSA abscess     Glucose monitor nursing POCT    Before meals and at bedtime     Activity - Up ad raven     Full Code     Contact Isolation    Given MRSA     Advance Diet as Tolerated    Follow this diet upon discharge: Orders Placed This Encounter      Advance Diet as Tolerated: Regular Diet Adult; 5837-4613 Calories: Moderate Consistent CHO (4-6 CHO units/meal)       Significant Results and Procedures   Most Recent 3 CBC's:Recent Labs   Lab Test 09/16/21  0927 09/15/21  0911 09/14/21  0442   WBC 9.6 14.2* 21.7*   HGB 12.2* 10.8* 12.2*   MCV 85 85 82    264 289     Most Recent 3 BMP's:Recent Labs   Lab Test 09/17/21  0217 09/16/21  2125 09/16/21  1836 09/16/21  1433 09/16/21  0927 09/15/21  1233 09/15/21  0911 09/14/21  0636 09/14/21  0442 09/13/21  1253 09/13/21  0917   NA  --   --   --   --  139  --  134  --   --   --  135   POTASSIUM  --   --   --   --  3.7  --  4.0  --  3.8   < > 4.2   CHLORIDE  --   --   --   --  102  --  102  --   --   --  102   CO2  --   --   --   --  31  --  30  --   --   --  24   BUN  --   --   --   --  10  --  10  --   --   --  15   CR  --   --   --   --  1.14  --  1.11  --   --   --  1.15   ANIONGAP  --   --   --   --  6  --  2*  --    --   --  9   ELINOR  --   --   --   --  9.1  --  8.2*  --   --   --  8.5   * 129* 131*   < > 155*   < > 251*   < >  --    < > 314*    < > = values in this interval not displayed.     Most Recent 2 LFT's:Recent Labs   Lab Test 09/12/21  1820   AST 7   ALT 13   ALKPHOS 178*   BILITOTAL 0.6     Most Recent 6 Bacteria Isolates From Any Culture (See EPIC Reports for Culture Details):No lab results found.  Most Recent TSH and T4:No lab results found.  Most Recent 6 glucoses:Recent Labs   Lab Test 09/17/21  0217 09/16/21  2125 09/16/21  1836 09/16/21  1433 09/16/21  0927 09/16/21  0903   * 129* 131* 113* 155* 125*     Most Recent Urinalysis:No lab results found.,   Results for orders placed or performed during the hospital encounter of 09/12/21   Soft tissue neck CT w contrast    Narrative    CT SCAN OF THE NECK WITH CONTRAST  9/12/2021 7:45 PM     HISTORY: Neck abscess, deep tissue. Infection to back of neck.    TECHNIQUE: Axial CT images of the neck following administration of  intravenous contrast with reformations. Radiation dose for this scan  was reduced using automated exposure control, adjustment of the mA  and/or kV according to patient size, or iterative reconstruction  technique. 80mL Isovue-370 IV.     COMPARISON: None.     FINDINGS: Swelling with extensive edema and soft tissue induration  noted centered in the posterior occipital, suboccipital and midline  posterior cervical regions. This extends from about the external  occipital protuberance superiorly and inferiorly to the soft tissues  at the C5 level. Cutaneous and subcutaneous induration noted and there  is swelling and edema involving the posterior paraspinous musculature  and musculature extending to the occipital scalp. Soft tissue swelling  extends laterally and anteriorly adjacent to the mid and upper  portions of the sternocleidomastoid muscles. No evidence for discrete,  drainable fluid collection or radiopaque foreign body. No gas  bubbles.  No evidence for extension into the deep cervical soft tissues or  interspinous involvement. Thickening of the trapezius and ligamentum  nuchae structures noted.  Muscular edema versus myositis possible.  There is no evidence for intramuscular fluid collection or mass.    Visualized sinuses, nasopharynx and orbits: Unremarkable.     Tongue, oral cavity and oropharynx:  Unremarkable.     Hypopharynx: Unremarkable.     Larynx and trachea: Unremarkable.     Thyroid: No abnormal thyroid gland nodules.     Submandibular glands: Unremarkable.     Parotid glands: Unremarkable.       Lymph nodes: Scattered reactive nodes noted in the jugular chains and  posterior triangles. No evidence for cystic or necrotic adenopathy.     Vasculature: Unremarkable.     Upper mediastinum and lungs: Unremarkable.     Bones: Unremarkable.     Extensive mandibular and maxillary dental caries and periodontal  disease noted.      Impression    IMPRESSION:    Ill-defined, moderately extensive inflammatory process involving the  posterior upper cervical and occipitocervical soft tissues. No  evidence for discrete drainable abscess, bone involvement or deep  intraspinal extension. Mild muscular swelling and interspersed edema  noted. Probable extension of presumed cellulitis into the spaces,  however myositis remains a possibility. No evidence for discrete,  intramuscular masses or fluid collections.      HAY JONES MD         SYSTEM ID:  [id number]   XR Foot Right G/E 3 Views    Narrative    XR FOOT RIGHT G/E 3 VIEWS 9/13/2021 4:42 PM     HISTORY: ulcer below the first metataral head      Impression    IMPRESSION: Plantar wound beneath the first metatarsal head as seen on  the tangential view. No apparent osseous destruction or joint space  narrowing. Fifth toe proximal phalangeal deformity, presumably related  to old healed fracture. Forefoot arterial calcification.    MICHAEL CRONIN MD         SYSTEM ID:  [id number]        Discharge Medications   Current Discharge Medication List      START taking these medications    Details   acetaminophen (TYLENOL) 500 MG tablet Take 2 tablets (1,000 mg) by mouth every 8 hours as needed for other (mild to moderate pain)    Associated Diagnoses: Cellulitis and abscess of neck      Alcohol Swabs PADS Use to swab the area of the injection or abilio as directed. Per insurance coverage  Qty: 100 each, Refills: 0    Comments: Future refills by PCP Dr. Sang Edmondson with phone number [phone].  Associated Diagnoses: Type 2 diabetes mellitus with hyperglycemia, unspecified whether long term insulin use (H)      blood glucose (NO BRAND SPECIFIED) lancets standard To use to test glucose level in the blood. Use to test blood sugar  3  times daily as directed. To accompany glucose monitor brands per insurance coverage.  Qty: 100 each, Refills: 0    Comments: Future refills by PCP Dr. Sang Edmondson with phone number [phone].  Associated Diagnoses: Type 2 diabetes mellitus with hyperglycemia, unspecified whether long term insulin use (H)      blood glucose (NO BRAND SPECIFIED) test strip To use to test glucose level in the blood. Use to test blood sugar  3 times daily as directed. To accompany glucose monitor brands per insurance coverage.  Qty: 100 strip, Refills: 0    Comments: Future refills by PCP Dr. Sang Edmondson with phone number [phone].  Associated Diagnoses: Type 2 diabetes mellitus with hyperglycemia, unspecified whether long term insulin use (H)      blood glucose calibration (NO BRAND SPECIFIED) solution Used to calibrate the blood glucose monitor as needed and as directed.  To accompany  blood glucose brands per insurance coverage  Qty: 1 each, Refills: 0    Comments: Future refills by PCP Dr. Sang Edmondson with phone number [phone].  Associated Diagnoses: Type 2 diabetes mellitus with hyperglycemia, unspecified whether long term insulin use (H)      blood glucose monitoring (NO BRAND  SPECIFIED) meter device kit Use as directed. Per insurance coverage  Qty: 1 kit, Refills: 0    Comments: Future refills by PCP Dr. Sang Edmondson with phone number [phone].  Associated Diagnoses: Type 2 diabetes mellitus with hyperglycemia, unspecified whether long term insulin use (H)      hydrOXYzine (ATARAX) 25 MG tablet Take 1 tablet (25 mg) by mouth 3 times daily as needed for anxiety    Associated Diagnoses: Anxiety      !! insulin aspart (NOVOLOG FLEXPEN) 100 UNIT/ML pen Inject 6 Units Subcutaneous 3 times daily (with meals)  Qty: 15 mL, Refills: 0    Associated Diagnoses: Type 2 diabetes mellitus with hyperglycemia, unspecified whether long term insulin use (H)      !! insulin aspart (NOVOLOG FLEXPEN) 100 UNIT/ML pen Novolog Flexpen  Give before meals and before bed:  For Pre-Meal Glucose:  140-189 give 1 unit   190-239 give 2 units   240-289 give 3 units   290-339 give 4 units   = or >340 give 5 units     For Bedtime Glucose  200 - 239 give 1 units   240 - 289 give 1.5 units  290 - 339 give 2 units  = or >340 give 2.5 units  Qty: 15 mL, Refills: 0    Associated Diagnoses: Type 2 diabetes mellitus with hyperglycemia, unspecified whether long term insulin use (H)      insulin glargine (LANTUS SOLOSTAR) 100 UNIT/ML pen Inject 20 Units Subcutaneous At Bedtime Lantus Solostar Pen  Qty: 15 mL, Refills: 0    Comments: If Lantus is not covered by insurance, may substitute Basaglar at same dose and frequency.    Associated Diagnoses: Type 2 diabetes mellitus with hyperglycemia, unspecified whether long term insulin use (H)      insulin pen needle (31G X 5 MM) 31G X 5 MM miscellaneous Use as directed by provider. Per insurance coverage  Qty: 100 each, Refills: 0    Comments: Future refills by PCP Dr. Sang Edmondson with phone number [phone].  Associated Diagnoses: Type 2 diabetes mellitus with hyperglycemia, unspecified whether long term insulin use (H)      oxyCODONE (ROXICODONE) 5 MG tablet Take 1 tablet (5 mg)  by mouth 3 times daily as needed for moderate to severe pain (use as second choice to tylenol if pain not relieved with tyelnol.)  Qty: 21 tablet    Associated Diagnoses: Cellulitis and abscess of neck      senna-docusate (SENOKOT-S/PERICOLACE) 8.6-50 MG tablet Take 1 tablet by mouth 2 times daily as needed for constipation    Associated Diagnoses: Drug-induced constipation      Sharps Container MISC Use as directed to dispose of needles, lancets and other sharps. Per Insurance coverage  Qty: 1 each, Refills: 0    Comments: Future refills by PCP Dr. Sang Edmondson with phone number [phone].  Associated Diagnoses: Type 2 diabetes mellitus with hyperglycemia, unspecified whether long term insulin use (H)      sulfamethoxazole-trimethoprim (BACTRIM DS) 800-160 MG tablet Take 1 tablet by mouth 2 times daily for 10 days  Qty: 20 tablet    Associated Diagnoses: Cellulitis and abscess of neck       !! - Potential duplicate medications found. Please discuss with provider.      CONTINUE these medications which have NOT CHANGED    Details   metFORMIN (GLUCOPHAGE) 500 MG tablet Take 1,000 mg by mouth 2 times daily         STOP taking these medications       ibuprofen (ADVIL/MOTRIN) 200 MG tablet Comments:   Reason for Stopping:             Allergies   No Known Allergies

## 2021-09-17 NOTE — PLAN OF CARE
DATE & TIME: 09/16/2021 1900-0730  Cognitive Concerns/ Orientation : A&O x4  BEHAVIOR & AGGRESSION TOOL COLOR: Green   ABNL VS/O2: VSS on RA  MOBILITY: Independent, ortho shoe when ambulating.   PAIN MANAGMENT: Pain at R neck abcess/I&D site, managed with Tylenol, oxycodone.   DIET: Moderate carbohydrate, tolerating, fair appetite.   BOWEL/BLADDER: continent, up to bathroom; BM x1 today.   ABNL LAB/BG: , 129, 199. K and Mag protocols, WNL, rechecks in am.   DRAIN/DEVICES: PIV x2, saline locked.   TELEMETRY RHYTHM: n/a  SKIN: Abscess/I&D site on R side of neck, dressing changed per surgery this evening, CDI. Ortho shoe when ambulating.   TESTS/PROCEDURES: POD2 I&D of R neck abscess.   D/C DATE: pending, TCU placement.   OTHER IMPORTANT INFO: Surgery and WOC following. ID now signed off. Diabetic education started, pt administered own insulin, has glucometer in room. Pt is concerned about red, pimple-like bumps on his scalp.

## 2021-09-17 NOTE — PROGRESS NOTES
[facility]    Medicine Progress Note - Hospitalist Service       Date of Admission:  9/12/2021    Assessment & Plan           Chris Shea is a 50 year old male with hx of poorly-controlled DM2, chronic right foot ulcer, and methamphetamine abuse who was admitted on 9/12/2021 for sepsis due to a large neck abscess     Posterior neck cellulitis and MRSA abscess  Presented with 2 day history of progressive neck pain, redness, and swelling with purulent drainage after picking at his skin. On arrival, he was noted to be tachycardic with leukocytosis to 16.4k. In the ED, he was initiated on IV vancomycin and pip-tazo.    - CT neck showed Ill-defined, moderately extensive inflammatory process involving the posterior upper cervical and occipitocervical soft tissues. No evidence for discrete drainable abscess, bone involvement or deep intraspinal extension.  - General Surgery consulted and s/p I&D 9/14, operative cultures grew MRSA.    - ID consult appreciated, IV vancomycin and pip-tazo continued initially, Zosyn was discontinued once MRSA identified on operative culture.  - 9/12 blood cultures x2 NGTD  - Leukocytosis has resolved, plan is 10 days of bactrim at discharge.      Chronic right mid-foot ulcer  2 cm ulcer over plantar aspect of right mid-foot. States he follows at wound clinic through [facility].   - WOCN consult  - Podiatry consulted,s /p excisional debridement of R foot ulcer 9/14      Methamphetamine abuse  Admits to using methamphetamine twice weekly. Last used: 9/10. Denies IVDU. Was  diaphoretic, likely in early withdrawal.  - Psychiatry consulted-they feel he is not withdrawing and has not used frequently.  As needed hydroxyzine ordered, clonidine or benzodiazepine was not recommended.     DM2 with peripheral vascular disease, uncontrolled  BG>400 on arrival. 6/24/21 A1c 13.8. PTA on metformin 1000 mg BID  - Holding PTA metformin  - Started Lantus 20 units at  bedtime, increased to 25 units daily and premeal novolog added per carb counting.   - PTA Metformin resumed at 500 mg twice daily and increased to 1 gm BID.  Premeal NovoLog to 1 unit per 10 g carbohydrate.  - High SSI available  - discussed with patient regarding insulin at discharge, he is open to it though does not prefer. Nursing to continue to teach how to inject insulin meanwhile.     Diet: Advance Diet as Tolerated: Regular Diet Adult; 8787-4311 Calories: Moderate Consistent CHO (4-6 CHO units/meal)  Advance Diet as Tolerated    DVT Prophylaxis: Pneumatic Compression Devices  Tam Catheter: Not present  Central Lines: None  Code Status: Full Code      Disposition Plan   Expected discharge: stable for discharge pending availability of TCU     The patient's care was discussed with the Bedside Nurse and Patient.    Luisa Barfield MD  Hospitalist Service  [facility]  Securely message with the Vocera Web Console (learn more here)  Text page via [employer] Paging/Directory      Clinically Significant Risk Factors Present on Admission                 ______________________________________________________________________    Interval History   Patient was evaluated this morning. Neck pain controlled, overall improved since I&D. Afebrile. BS in 100s this afternoon though was upto 200s this am.    Remains afebrile    Data reviewed today: I reviewed all medications, new labs results over the last 24 hours. I personally reviewed no images or EKG's today.    Physical Exam   Vital Signs: Temp: 98  F (36.7  C) Temp src: Oral BP: (!) 156/99 Pulse: 85   Resp: 18 SpO2: 94 % O2 Device: None (Room air)    Weight: 250 lbs 0 oz    Gen: NAD, pleasant  HEENT: PERRLA EOMI. I&D'ed wound in nape of neck with packing, dressing is soaked with serous drainage, surrounding edema and erythema has markedly improved.    Resp: CTA b/l, no dyspnea.   CV: S1S2 regular, no murmur. No tachycardia.   Abdo: soft, nontender,  nondistended, bowel sounds present  Ext: calves nontender, well perfused. Rt foot I&D'ed wound not examined.   Neuro: AAOx3, CN grossly intact, no facial asymmetry      Data   Recent Labs   Lab 09/17/21  1748 09/17/21  1347 09/17/21  0837 09/17/21  0817 09/17/21  0217 09/16/21  1433 09/16/21  0927 09/15/21  1233 09/15/21  0911 09/14/21  0636 09/14/21  0442 09/13/21  1253 09/13/21  0917 09/12/21 2057 09/12/21  1820   WBC  --   --   --   --   --   --  9.6  --  14.2*  --  21.7*   < > 17.9*   < > 16.4*   HGB  --   --   --   --   --   --  12.2*  --  10.8*  --  12.2*   < > 12.2*   < > 13.2*   MCV  --   --   --   --   --   --  85  --  85  --  82   < > 82   < > 83   PLT  --   --   --   --   --   --  304  --  264  --  289   < > 286   < > 322   NA  --   --   --   --   --   --  139  --  134  --   --   --  135   < > 133   POTASSIUM  --   --   --  3.8  --   --  3.7  --  4.0   < > 3.8   < > 4.2   < > 4.3   CHLORIDE  --   --   --   --   --   --  102  --  102  --   --   --  102   < > 97   CO2  --   --   --   --   --   --  31  --  30  --   --   --  24   < > 26   BUN  --   --   --   --   --   --  10  --  10  --   --   --  15   < > 17   CR  --   --   --  1.01  --   --  1.14  --  1.11  --   --    < > 1.15   < > 1.24   ANIONGAP  --   --   --   --   --   --  6  --  2*  --   --   --  9   < > 10   ELINOR  --   --   --   --   --   --  9.1  --  8.2*  --   --   --  8.5   < > 9.1   * 142* 206*  --    < >   < > 155*   < > 251*   < >  --    < > 314*   < > 430*   ALBUMIN  --   --   --   --   --   --   --   --   --   --   --   --   --   --  2.6*   PROTTOTAL  --   --   --   --   --   --   --   --   --   --   --   --   --   --  7.9   BILITOTAL  --   --   --   --   --   --   --   --   --   --   --   --   --   --  0.6   ALKPHOS  --   --   --   --   --   --   --   --   --   --   --   --   --   --  178*   ALT  --   --   --   --   --   --   --   --   --   --   --   --   --   --  13   AST  --   --   --   --   --   --   --   --   --   --   --   --    --   --  7    < > = values in this interval not displayed.     No results found for this or any previous visit (from the past 24 hour(s)).

## 2021-09-17 NOTE — PLAN OF CARE
DATE & TIME: 09/17/2021 3909-0068  Cognitive Concerns/ Orientation : A&O x4  BEHAVIOR & AGGRESSION TOOL COLOR: Green   ABNL VS/O2: BP elevated 157/107, 158/109, 156/99, other VSS, O2 wnl RA   MOBILITY: Independent, ortho shoe when ambulating.   PAIN MANAGMENT: C/o pain around neck wound/I&D site, received prn Oxycodone x2 and Tylenol x2.  DIET: Moderate carbohydrate, tolerating, good appetite.   BOWEL/BLADDER: continent, up to bathroom.   ABNL LAB/BG: , 142, 136  DRAIN/DEVICES: PIV x2, saline locked.   TELEMETRY RHYTHM: n/a  SKIN: Abscess/I&D site on Rt side of neck, dressing changed per surgery, CDI. Rt foot wound care done per WOC order, dressing CDI, Ortho shoe when ambulating.   TESTS/PROCEDURES:none   D/C DATE: pending TCU placement, SW following.   DISCHARGE BARRIERS: Recent hx of drug use, barrier for TCU placement.   OTHER IMPORTANT INFO: Surgery and WOC following. ID now signed off. Currently on IV Vancomycin BID, plan is 10 days of bactrim at discharge. Insulin administration education in process, pt gave his own insulins with directions one step at a time, still doesn't feel 100% comfortable with insulin pen. D/t elevated BP, started on Lisinopril 5 mg daily.

## 2021-09-18 LAB
CREAT SERPL-MCNC: 0.94 MG/DL (ref 0.66–1.25)
GFR SERPL CREATININE-BSD FRML MDRD: >90 ML/MIN/1.73M2
GLUCOSE BLDC GLUCOMTR-MCNC: 191 MG/DL (ref 70–99)
GLUCOSE BLDC GLUCOMTR-MCNC: 217 MG/DL (ref 70–99)
GLUCOSE BLDC GLUCOMTR-MCNC: 217 MG/DL (ref 70–99)
GLUCOSE BLDC GLUCOMTR-MCNC: 98 MG/DL (ref 70–99)

## 2021-09-18 PROCEDURE — 120N000001 HC R&B MED SURG/OB

## 2021-09-18 PROCEDURE — 82565 ASSAY OF CREATININE: CPT | Performed by: INTERNAL MEDICINE

## 2021-09-18 PROCEDURE — 250N000011 HC RX IP 250 OP 636: Performed by: INTERNAL MEDICINE

## 2021-09-18 PROCEDURE — 250N000013 HC RX MED GY IP 250 OP 250 PS 637: Performed by: PHYSICIAN ASSISTANT

## 2021-09-18 PROCEDURE — 99232 SBSQ HOSP IP/OBS MODERATE 35: CPT | Performed by: HOSPITALIST

## 2021-09-18 PROCEDURE — 258N000003 HC RX IP 258 OP 636: Performed by: INTERNAL MEDICINE

## 2021-09-18 PROCEDURE — 250N000012 HC RX MED GY IP 250 OP 636 PS 637: Performed by: HOSPITALIST

## 2021-09-18 PROCEDURE — 250N000013 HC RX MED GY IP 250 OP 250 PS 637: Performed by: HOSPITALIST

## 2021-09-18 PROCEDURE — 36415 COLL VENOUS BLD VENIPUNCTURE: CPT | Performed by: INTERNAL MEDICINE

## 2021-09-18 RX ADMIN — INSULIN ASPART 3 UNITS: 100 INJECTION, SOLUTION INTRAVENOUS; SUBCUTANEOUS at 18:15

## 2021-09-18 RX ADMIN — METFORMIN HYDROCHLORIDE 1000 MG: 500 TABLET, FILM COATED ORAL at 08:35

## 2021-09-18 RX ADMIN — VANCOMYCIN HYDROCHLORIDE 1500 MG: 5 INJECTION, POWDER, LYOPHILIZED, FOR SOLUTION INTRAVENOUS at 08:42

## 2021-09-18 RX ADMIN — METFORMIN HYDROCHLORIDE 1000 MG: 500 TABLET, FILM COATED ORAL at 17:52

## 2021-09-18 RX ADMIN — ACETAMINOPHEN 650 MG: 325 TABLET, FILM COATED ORAL at 11:09

## 2021-09-18 RX ADMIN — ACETAMINOPHEN 650 MG: 325 TABLET, FILM COATED ORAL at 03:00

## 2021-09-18 RX ADMIN — OXYCODONE HYDROCHLORIDE 5 MG: 5 TABLET ORAL at 03:00

## 2021-09-18 RX ADMIN — OXYCODONE HYDROCHLORIDE 5 MG: 5 TABLET ORAL at 11:10

## 2021-09-18 RX ADMIN — INSULIN GLARGINE 25 UNITS: 100 INJECTION, SOLUTION SUBCUTANEOUS at 21:12

## 2021-09-18 RX ADMIN — INSULIN ASPART 4 UNITS: 100 INJECTION, SOLUTION INTRAVENOUS; SUBCUTANEOUS at 08:34

## 2021-09-18 RX ADMIN — LISINOPRIL 5 MG: 5 TABLET ORAL at 08:35

## 2021-09-18 RX ADMIN — VANCOMYCIN HYDROCHLORIDE 1500 MG: 5 INJECTION, POWDER, LYOPHILIZED, FOR SOLUTION INTRAVENOUS at 21:08

## 2021-09-18 ASSESSMENT — ACTIVITIES OF DAILY LIVING (ADL)
ADLS_ACUITY_SCORE: 3
ADLS_ACUITY_SCORE: 3
ADLS_ACUITY_SCORE: 5

## 2021-09-18 NOTE — PLAN OF CARE
DATE & TIME: 9/17/21 2615-5061    Cognitive Concerns/ Orientation : Pt A/Ox4   BEHAVIOR & AGGRESSION TOOL COLOR: Green   ABNL VS/O2: VSS on RA, except BP elevated  MOBILITY: Independent, pt not wearing ortho shoe when walking in room  PAIN MANAGMENT: Complaints of neck pain, managed with PRN Dilaudid, Tylenol, and Oxycodone  DIET: Mod CHO  BOWEL/BLADDER: Continent  ABNL LAB/BG: /217  DRAIN/DEVICES: IV SL  SKIN: Abscess on R side of neck, dressing changed, CDI. Pt reinforced dressing again overnight. Dressing on foot came off overnight. Pt wanted to do dressing himself he said that in order to be able to eventually go home he needed to be able to do the dressing.  D/C DATE: Discharge pending TCU placement  OTHER IMPORTANT INFO: Surgery and WOC following. Continues on IV vancomycin. Pt awake until 0400 restless unable to sleep.

## 2021-09-18 NOTE — PHARMACY-VANCOMYCIN DOSING SERVICE
Pharmacy Vancomycin Note  Date of Service 2021  Patient's  1970   50 year old, male    Indication: Abscess  Day of Therapy: 6  Current vancomycin regimen:  1250 mg IV q12h  Current vancomycin monitoring method: AUC  Current vancomycin therapeutic monitoring goal: 400-600 mg*h/L    Current estimated CrCl = Estimated Creatinine Clearance: 114.6 mL/min (based on SCr of 1.01 mg/dL).    Creatinine for last 3 days  9/15/2021:  9:11 AM Creatinine 1.11 mg/dL  2021:  9:27 AM Creatinine 1.14 mg/dL  2021:  8:17 AM Creatinine 1.01 mg/dL    Recent Vancomycin Levels (past 3 days)  2021:  7:35 PM Vancomycin 11.1 mg/L    Vancomycin IV Administrations (past 72 hours)                   vancomycin 1250 mg in 0.9% NaCl 250 mL intermittent infusion 1,250 mg (mg) 1,250 mg New Bag 21 0846     1,250 mg New Bag 214     1,250 mg New Bag  0956     1,250 mg New Bag 09/15/21 2006     1,250 mg New Bag  0808                Nephrotoxins and other renal medications (From now, onward)    Start     Dose/Rate Route Frequency Ordered Stop    21 1500  lisinopril (ZESTRIL) tablet 5 mg      5 mg Oral DAILY 21 1455      21 0000  lisinopril (ZESTRIL) 5 MG tablet      5 mg Oral DAILY 21 1455               Contrast Orders - past 72 hours (72h ago, onward)    None          Interpretation of levels and current regimen:  Vancomycin level is reflective of AUC less than 400    Has serum creatinine changed greater than 50% in last 72 hours: Yes    Urine output:  good urine output    Renal Function: Stable    Loading dose: N/A  Regimen: 1500 mg IV every 12 hours.  Start time: 20:46 on 2021  Exposure target: AUC24 (range)400-600 mg/L.hr   AUC24,ss: 451 mg/L.hr  Probability of AUC24 > 400: 77 %  Ctrough,ss: 12.5 mg/L  Probability of Ctrough,ss > 20: 1 %  Probability of nephrotoxicity (Lodise MARKO ): 8 %    Plan:  1. Increase Dose to 1500 mg every 12 hours   2. Vancomycin monitoring  method: AUC  3. Vancomycin therapeutic monitoring goal: 400-600 mg*h/L  4. Pharmacy will check vancomycin levels as appropriate in 1-3 Days.  5. Serum creatinine levels will be ordered daily for the first week of therapy and at least twice weekly for subsequent weeks.    Juan Antonio Blount RPH

## 2021-09-18 NOTE — PLAN OF CARE
DATE & TIME: 9/18/21 6097-7196    Cognitive Concerns/ Orientation : Pt A/Ox4   BEHAVIOR & AGGRESSION TOOL COLOR: Green   ABNL VS/O2: VSS on RA  MOBILITY: Independent, steady, encouraged to wear ortho shoes when OOB.   PAIN MANAGMENT: Complaints of neck pain 7/10 received prn Tylenol x1 and Oxycodone 5 mg x1.   DIET: Mod CHO  BOWEL/BLADDER: Continent  ABNL LAB/BG: , 98, 191  DRAIN/DEVICES: IV SL  SKIN: Abscess on R side of neck, dressing changed x2, CDI, new dressing on Rt foot, CDI. D/C DATE: Discharge pending TCU placement, SW following.   DISCHARGE BARRIERS: Recent hx of drug use, barrier for TCU placement.   OTHER IMPORTANT INFO: Continues on IV vancomycin q12h, plan in place for 10 days of bactrim at discharge. Insulin administration education in process, pt gave his own insulins with directions one step at a time, getting more comfortable compared to yesterday. SW following for placement.

## 2021-09-18 NOTE — PROGRESS NOTES
[facility]    Medicine Progress Note - Hospitalist Service       Date of Admission:  9/12/2021    Assessment & Plan           Chris Shea is a 50 year old male with hx of poorly-controlled DM2, chronic right foot ulcer, and methamphetamine abuse who was admitted on 9/12/2021 for sepsis due to a large neck abscess     Posterior neck cellulitis and MRSA abscess  Presented with 2 day history of progressive neck pain, redness, and swelling with purulent drainage after picking at his skin. On arrival, he was noted to be tachycardic with leukocytosis to 16.4k. In the ED, he was initiated on IV vancomycin and pip-tazo.    - CT neck showed Ill-defined, moderately extensive inflammatory process involving the posterior upper cervical and occipitocervical soft tissues. No evidence for discrete drainable abscess, bone involvement or deep intraspinal extension.  - General Surgery consulted and s/p I&D 9/14, operative cultures grew MRSA.    - ID consult appreciated, IV vancomycin and pip-tazo continued initially, Zosyn was discontinued once MRSA identified on operative culture.  - 9/12 blood cultures x2 NGTD  - Leukocytosis has resolved, IV vanco while in hospital, and plan is 10 days of bactrim at discharge.      Chronic right mid-foot ulcer  2 cm ulcer over plantar aspect of right mid-foot. States he follows at wound clinic through [facility].   - WOCN consult  - Podiatry consulted,s /p excisional debridement of R foot ulcer 9/14      Methamphetamine abuse  Admits to using methamphetamine twice weekly. Last used: 9/10. Denies IVDU. Was  diaphoretic, likely in early withdrawal.  - Psychiatry consulted-they feel he is not withdrawing and has not used frequently.  As needed hydroxyzine ordered, clonidine or benzodiazepine was not recommended.     DM2 with peripheral vascular disease, uncontrolled  BG>400 on arrival. 6/24/21 A1c 13.8. PTA on metformin 1000 mg BID  - Holding PTA metformin  -  Started Lantus 20 units at bedtime, increased to 25 units daily  - Simplify regimen to 6 units novolog premeal instead of carb counting.    - PTA Metformin resumed at 500 mg twice daily and increased to 1 gm BID.     - High SSI available  - discussed with patient regarding insulin at discharge, he is open to it though does not prefer. Nursing to continue to teach how to inject insulin meanwhile.     Diet: Advance Diet as Tolerated: Regular Diet Adult; 3036-7418 Calories: Moderate Consistent CHO (4-6 CHO units/meal)  Advance Diet as Tolerated    DVT Prophylaxis: Pneumatic Compression Devices  Tam Catheter: Not present  Central Lines: None  Code Status: Full Code      Disposition Plan   Expected discharge: stable for discharge pending availability of TCU     The patient's care was discussed with the Bedside Nurse and Patient.    Lusia Barfield MD  Hospitalist Service  [facility]  Securely message with the Vocera Web Console (learn more here)  Text page via [employer] Paging/Directory      Clinically Significant Risk Factors Present on Admission    ______________________________________________________________________    Interval History   Pain at the site wound is controlled. Afebrile. No acute issues.     Data reviewed today: I reviewed all medications, new labs results over the last 24 hours. I personally reviewed no images or EKG's today.    Physical Exam   Vital Signs: Temp: 98.3  F (36.8  C) Temp src: Oral BP: 131/76 Pulse: 91   Resp: 17 SpO2: 99 % O2 Device: None (Room air)    Weight: 250 lbs 0 oz    Gen: NAD, pleasant  HEENT: PERRLA EOMI. I&D'ed wound in nape of neck with packing, dressing is soaked with serous drainage, surrounding edema and erythema has markedly improved since admission.    Resp: CTA b/l, no dyspnea.   CV: S1S2 regular, no murmur. No tachycardia.   Abdo: soft, nontender, nondistended, bowel sounds present  Ext: calves nontender, well perfused. Rt foot I&D'ed wound  not examined.   Neuro: AAOx3, CN grossly intact, no facial asymmetry      Data   Recent Labs   Lab 09/18/21  0749 09/18/21  0114 09/17/21  2103 09/17/21  0837 09/17/21  0817 09/16/21  1433 09/16/21  0927 09/15/21  1233 09/15/21  0911 09/14/21  0636 09/14/21  0442 09/13/21  1253 09/13/21  0917 09/12/21 2057 09/12/21  1820   WBC  --   --   --   --   --   --  9.6  --  14.2*  --  21.7*   < > 17.9*   < > 16.4*   HGB  --   --   --   --   --   --  12.2*  --  10.8*  --  12.2*   < > 12.2*   < > 13.2*   MCV  --   --   --   --   --   --  85  --  85  --  82   < > 82   < > 83   PLT  --   --   --   --   --   --  304  --  264  --  289   < > 286   < > 322   NA  --   --   --   --   --   --  139  --  134  --   --   --  135   < > 133   POTASSIUM  --   --   --   --  3.8  --  3.7  --  4.0   < > 3.8   < > 4.2   < > 4.3   CHLORIDE  --   --   --   --   --   --  102  --  102  --   --   --  102   < > 97   CO2  --   --   --   --   --   --  31  --  30  --   --   --  24   < > 26   BUN  --   --   --   --   --   --  10  --  10  --   --   --  15   < > 17   CR  --   --   --   --  1.01  --  1.14  --  1.11  --   --    < > 1.15   < > 1.24   ANIONGAP  --   --   --   --   --   --  6  --  2*  --   --   --  9   < > 10   ELINOR  --   --   --   --   --   --  9.1  --  8.2*  --   --   --  8.5   < > 9.1   * 217* 202*   < >  --    < > 155*   < > 251*   < >  --    < > 314*   < > 430*   ALBUMIN  --   --   --   --   --   --   --   --   --   --   --   --   --   --  2.6*   PROTTOTAL  --   --   --   --   --   --   --   --   --   --   --   --   --   --  7.9   BILITOTAL  --   --   --   --   --   --   --   --   --   --   --   --   --   --  0.6   ALKPHOS  --   --   --   --   --   --   --   --   --   --   --   --   --   --  178*   ALT  --   --   --   --   --   --   --   --   --   --   --   --   --   --  13   AST  --   --   --   --   --   --   --   --   --   --   --   --   --   --  7    < > = values in this interval not displayed.     No results found for this or any  previous visit (from the past 24 hour(s)).

## 2021-09-19 LAB
CREAT SERPL-MCNC: 1.04 MG/DL (ref 0.66–1.25)
GFR SERPL CREATININE-BSD FRML MDRD: 82 ML/MIN/1.73M2
GLUCOSE BLDC GLUCOMTR-MCNC: 117 MG/DL (ref 70–99)
GLUCOSE BLDC GLUCOMTR-MCNC: 150 MG/DL (ref 70–99)
GLUCOSE BLDC GLUCOMTR-MCNC: 155 MG/DL (ref 70–99)
GLUCOSE BLDC GLUCOMTR-MCNC: 191 MG/DL (ref 70–99)
GLUCOSE BLDC GLUCOMTR-MCNC: 240 MG/DL (ref 70–99)
GLUCOSE BLDC GLUCOMTR-MCNC: 264 MG/DL (ref 70–99)

## 2021-09-19 PROCEDURE — 82565 ASSAY OF CREATININE: CPT | Performed by: INTERNAL MEDICINE

## 2021-09-19 PROCEDURE — 250N000013 HC RX MED GY IP 250 OP 250 PS 637: Performed by: PHYSICIAN ASSISTANT

## 2021-09-19 PROCEDURE — 99232 SBSQ HOSP IP/OBS MODERATE 35: CPT | Performed by: HOSPITALIST

## 2021-09-19 PROCEDURE — 250N000011 HC RX IP 250 OP 636: Performed by: INTERNAL MEDICINE

## 2021-09-19 PROCEDURE — 258N000003 HC RX IP 258 OP 636: Performed by: INTERNAL MEDICINE

## 2021-09-19 PROCEDURE — 120N000001 HC R&B MED SURG/OB

## 2021-09-19 PROCEDURE — 250N000012 HC RX MED GY IP 250 OP 636 PS 637: Performed by: HOSPITALIST

## 2021-09-19 PROCEDURE — 250N000013 HC RX MED GY IP 250 OP 250 PS 637: Performed by: HOSPITALIST

## 2021-09-19 PROCEDURE — 36415 COLL VENOUS BLD VENIPUNCTURE: CPT | Performed by: INTERNAL MEDICINE

## 2021-09-19 RX ORDER — LISINOPRIL 10 MG/1
10 TABLET ORAL DAILY
Status: DISCONTINUED | OUTPATIENT
Start: 2021-09-19 | End: 2021-09-23 | Stop reason: HOSPADM

## 2021-09-19 RX ORDER — SULFAMETHOXAZOLE/TRIMETHOPRIM 800-160 MG
1 TABLET ORAL 2 TIMES DAILY
Status: DISCONTINUED | OUTPATIENT
Start: 2021-09-19 | End: 2021-09-23 | Stop reason: HOSPADM

## 2021-09-19 RX ADMIN — ACETAMINOPHEN 650 MG: 325 TABLET, FILM COATED ORAL at 05:46

## 2021-09-19 RX ADMIN — INSULIN ASPART 5 UNITS: 100 INJECTION, SOLUTION INTRAVENOUS; SUBCUTANEOUS at 09:00

## 2021-09-19 RX ADMIN — SULFAMETHOXAZOLE AND TRIMETHOPRIM 1 TABLET: 800; 160 TABLET ORAL at 22:10

## 2021-09-19 RX ADMIN — METFORMIN HYDROCHLORIDE 1000 MG: 500 TABLET, FILM COATED ORAL at 16:23

## 2021-09-19 RX ADMIN — OXYCODONE HYDROCHLORIDE 5 MG: 5 TABLET ORAL at 05:46

## 2021-09-19 RX ADMIN — VANCOMYCIN HYDROCHLORIDE 1500 MG: 5 INJECTION, POWDER, LYOPHILIZED, FOR SOLUTION INTRAVENOUS at 09:15

## 2021-09-19 RX ADMIN — LISINOPRIL 10 MG: 10 TABLET ORAL at 08:59

## 2021-09-19 RX ADMIN — INSULIN ASPART 1 UNITS: 100 INJECTION, SOLUTION INTRAVENOUS; SUBCUTANEOUS at 11:06

## 2021-09-19 RX ADMIN — METFORMIN HYDROCHLORIDE 1000 MG: 500 TABLET, FILM COATED ORAL at 08:59

## 2021-09-19 RX ADMIN — INSULIN GLARGINE 30 UNITS: 100 INJECTION, SOLUTION SUBCUTANEOUS at 22:10

## 2021-09-19 ASSESSMENT — ACTIVITIES OF DAILY LIVING (ADL)
ADLS_ACUITY_SCORE: 3

## 2021-09-19 NOTE — PROGRESS NOTES
[facility]    Medicine Progress Note - Hospitalist Service       Date of Admission:  9/12/2021    Assessment & Plan           Chris Shea is a 50 year old male with hx of poorly-controlled DM2, chronic right foot ulcer, and methamphetamine abuse who was admitted on 9/12/2021 for sepsis due to a large neck abscess     Posterior neck cellulitis and MRSA abscess  Presented with 2 day history of progressive neck pain, redness, and swelling with purulent drainage after picking at his skin. On arrival, he was noted to be tachycardic with leukocytosis to 16.4k. In the ED, he was initiated on IV vancomycin and pip-tazo.    - CT neck showed Ill-defined, moderately extensive inflammatory process involving the posterior upper cervical and occipitocervical soft tissues. No evidence for discrete drainable abscess, bone involvement or deep intraspinal extension.  - General Surgery consulted and s/p I&D 9/14, operative cultures grew MRSA.    - ID consult appreciated, IV vancomycin and pip-tazo continued initially, Zosyn was discontinued once MRSA identified on operative culture.  - 9/12 blood cultures x2 NGTD  - Leukocytosis has resolved, IV vanco while in hospital, and bactrim at discharge.     Chronic right mid-foot ulcer  2 cm ulcer over plantar aspect of right mid-foot. States he follows at wound clinic through [facility].   - WOCN consult  - Podiatry consulted,s /p excisional debridement of R foot ulcer 9/14      Methamphetamine abuse  Admits to using methamphetamine twice weekly. Last used: 9/10. Denies IVDU. Was  diaphoretic, likely in early withdrawal.  - Psychiatry consulted-they feel he is not withdrawing and has not used frequently.  As needed hydroxyzine ordered, clonidine or benzodiazepine was not recommended.     DM2 with peripheral vascular disease, uncontrolled  BG>400 on arrival. 6/24/21 A1c 13.8. PTA on metformin 1000 mg BID  -  PTA metformin held and started lantus with  premeal novolog and ISS this stay.   -  Blood sugars uncontrolled, adjusting insulin  - now back on PTA metformin as well   - High SSI available  - discussed with patient regarding insulin at discharge, agreeable. Nursing to continue to teach how to inject insulin meanwhile.     Diet: Advance Diet as Tolerated: Regular Diet Adult; 5162-9124 Calories: Moderate Consistent CHO (4-6 CHO units/meal)  Advance Diet as Tolerated    DVT Prophylaxis: Pneumatic Compression Devices  Tam Catheter: Not present  Central Lines: None  Code Status: Full Code      Disposition Plan   Expected discharge: stable for discharge pending availability of TCU     The patient's care was discussed with the Bedside Nurse and Patient.    Luisa Barfield MD  Hospitalist Service  [facility]  Securely message with the Vocera Web Console (learn more here)  Text page via [employer] Paging/Directory      Clinically Significant Risk Factors Present on Admission    ______________________________________________________________________    Interval History   Blood sugars are uncontrolled, in 200s though they were better the day prior. Patient reports he has been eating more as he has nothing much to do and enjoys food.   -neck pain controlled.   -afebrile    Data reviewed today: I reviewed all medications, new labs results over the last 24 hours. I personally reviewed no images or EKG's today.    Physical Exam   Vital Signs: Temp: 98  F (36.7  C) Temp src: Oral BP: (!) 146/100 Pulse: 94   Resp: 20 SpO2: 97 % O2 Device: None (Room air)    Weight: 250 lbs 0 oz    Gen: NAD, pleasant  HEENT: PERRLA EOMI. I&D'ed wound in nape of neck with packing, dressing is soaked with serous drainage, surrounding edema and erythema has markedly improved since admission.    Resp: CTA b/l, no dyspnea.   CV: S1S2 regular, no murmur. No tachycardia.   Abdo: soft, nontender, nondistended, bowel sounds present  Ext: calves nontender, well perfused. Rt foot  I&D'ed wound not examined.   Neuro: AAOx3, CN grossly intact, no facial asymmetry      Data   Recent Labs   Lab 09/19/21  0723 09/19/21  0306 09/18/21  2046 09/18/21  1153 09/18/21  1011 09/17/21  0837 09/17/21  0817 09/16/21  1433 09/16/21  0927 09/15/21  1233 09/15/21  0911 09/14/21  0636 09/14/21  0442 09/13/21  1253 09/13/21  0917 09/12/21 2057 09/12/21  1820   WBC  --   --   --   --   --   --   --   --  9.6  --  14.2*  --  21.7*   < > 17.9*   < > 16.4*   HGB  --   --   --   --   --   --   --   --  12.2*  --  10.8*  --  12.2*   < > 12.2*   < > 13.2*   MCV  --   --   --   --   --   --   --   --  85  --  85  --  82   < > 82   < > 83   PLT  --   --   --   --   --   --   --   --  304  --  264  --  289   < > 286   < > 322   NA  --   --   --   --   --   --   --   --  139  --  134  --   --   --  135   < > 133   POTASSIUM  --   --   --   --   --   --  3.8  --  3.7  --  4.0   < > 3.8   < > 4.2   < > 4.3   CHLORIDE  --   --   --   --   --   --   --   --  102  --  102  --   --   --  102   < > 97   CO2  --   --   --   --   --   --   --   --  31  --  30  --   --   --  24   < > 26   BUN  --   --   --   --   --   --   --   --  10  --  10  --   --   --  15   < > 17   CR  --   --   --   --  0.94  --  1.01  --  1.14   < > 1.11  --   --    < > 1.15   < > 1.24   ANIONGAP  --   --   --   --   --   --   --   --  6  --  2*  --   --   --  9   < > 10   ELINOR  --   --   --   --   --   --   --   --  9.1  --  8.2*  --   --   --  8.5   < > 9.1   * 264* 191*   < >  --    < >  --    < > 155*   < > 251*   < >  --    < > 314*   < > 430*   ALBUMIN  --   --   --   --   --   --   --   --   --   --   --   --   --   --   --   --  2.6*   PROTTOTAL  --   --   --   --   --   --   --   --   --   --   --   --   --   --   --   --  7.9   BILITOTAL  --   --   --   --   --   --   --   --   --   --   --   --   --   --   --   --  0.6   ALKPHOS  --   --   --   --   --   --   --   --   --   --   --   --   --   --   --   --  178*   ALT  --   --   --   --    --   --   --   --   --   --   --   --   --   --   --   --  13   AST  --   --   --   --   --   --   --   --   --   --   --   --   --   --   --   --  7    < > = values in this interval not displayed.     No results found for this or any previous visit (from the past 24 hour(s)).

## 2021-09-19 NOTE — PLAN OF CARE
DATE & TIME: 9/19/21 [id number]                   Cognitive Concerns/ Orientation : Pt A/Ox4   BEHAVIOR & AGGRESSION TOOL COLOR: Green             ABNL VS/O2: VSS on RA  MOBILITY: Independent, steady, encouraged to wear ortho shoes when OOB.   PAIN MANAGMENT: c/o mild neck pain, declined intervention.   DIET: Mod CHO  BOWEL/BLADDER: Continent  ABNL LAB/BG: , 155, 117  DRAIN/DEVICES: IV SL  SKIN: Abscess on R side of neck, dressing changed x2, CDI, new dressing on Rt foot, CDI. D/C DATE: Discharge pending TCU placement, SW following.   DISCHARGE BARRIERS: Recent hx of drug use, barrier for TCU placement.   OTHER IMPORTANT INFO: Continues on IV vancomycin q12h, plan in place for 10 days of bactrim at discharge. Insulin administration education in process, pt gave his own insulins with directions one step at a time, getting more comfortable. SW following for placement.

## 2021-09-19 NOTE — PLAN OF CARE
DATE & TIME: 9/18/21 1900-2300    Cognitive Concerns/ Orientation : Pt A/Ox4   BEHAVIOR & AGGRESSION TOOL COLOR: Green   ABNL VS/O2: VSS on RA  MOBILITY: Independent, encouraged to wear ortho shoe with OOB  PAIN MANAGMENT: Complaints of neck pain, declines intervention at this time  DIET: Mod CHO  BOWEL/BLADDER: Continent  ABNL LAB/BG:   DRAIN/DEVICES: IV SL  SKIN: Abscess on R side of neck, dressing CDI.  Dressing on right foot, CDI.  D/C DATE: Discharge pending placement to TCU  OTHER IMPORTANT INFO: Continues on IV Vancomycin

## 2021-09-19 NOTE — PLAN OF CARE
DATE & TIME: 9/18/21 0632-1461                 Cognitive Concerns/ Orientation : Pt A/Ox4   BEHAVIOR & AGGRESSION TOOL COLOR: Green             ABNL VS/O2: VSS on RA  MOBILITY: Independent, encouraged to wear ortho shoe when OOB  PAIN MANAGMENT: PRN Tylenol and Oxy given for 6/10 neck pain with relief  DIET: Mod CHO  BOWEL/BLADDER: Continent  ABNL LAB/BG:   DRAIN/DEVICES: IV SL  SKIN: Abscess on R side of neck, dressing CDI.  Dressing on right foot, CDI.  D/C DATE: Discharge pending placement to TCU  OTHER IMPORTANT INFO: Continues on IV Vancomycin.

## 2021-09-20 LAB
CREAT SERPL-MCNC: 1.03 MG/DL (ref 0.66–1.25)
GFR SERPL CREATININE-BSD FRML MDRD: 83 ML/MIN/1.73M2
GLUCOSE BLDC GLUCOMTR-MCNC: 106 MG/DL (ref 70–99)
GLUCOSE BLDC GLUCOMTR-MCNC: 114 MG/DL (ref 70–99)
GLUCOSE BLDC GLUCOMTR-MCNC: 133 MG/DL (ref 70–99)
GLUCOSE BLDC GLUCOMTR-MCNC: 156 MG/DL (ref 70–99)
GLUCOSE BLDC GLUCOMTR-MCNC: 158 MG/DL (ref 70–99)
GLUCOSE BLDC GLUCOMTR-MCNC: 158 MG/DL (ref 70–99)
MAGNESIUM SERPL-MCNC: 1.5 MG/DL (ref 1.6–2.3)
POTASSIUM BLD-SCNC: 3.7 MMOL/L (ref 3.4–5.3)

## 2021-09-20 PROCEDURE — 99232 SBSQ HOSP IP/OBS MODERATE 35: CPT | Performed by: HOSPITALIST

## 2021-09-20 PROCEDURE — 250N000012 HC RX MED GY IP 250 OP 636 PS 637: Performed by: HOSPITALIST

## 2021-09-20 PROCEDURE — 250N000013 HC RX MED GY IP 250 OP 250 PS 637: Performed by: PHYSICIAN ASSISTANT

## 2021-09-20 PROCEDURE — 120N000001 HC R&B MED SURG/OB

## 2021-09-20 PROCEDURE — 36415 COLL VENOUS BLD VENIPUNCTURE: CPT | Performed by: INTERNAL MEDICINE

## 2021-09-20 PROCEDURE — 84132 ASSAY OF SERUM POTASSIUM: CPT | Performed by: HOSPITALIST

## 2021-09-20 PROCEDURE — 83735 ASSAY OF MAGNESIUM: CPT | Performed by: HOSPITALIST

## 2021-09-20 PROCEDURE — 250N000013 HC RX MED GY IP 250 OP 250 PS 637: Performed by: HOSPITALIST

## 2021-09-20 PROCEDURE — 82565 ASSAY OF CREATININE: CPT | Performed by: INTERNAL MEDICINE

## 2021-09-20 RX ORDER — MAGNESIUM OXIDE 400 MG/1
400 TABLET ORAL 3 TIMES DAILY
Status: COMPLETED | OUTPATIENT
Start: 2021-09-20 | End: 2021-09-21

## 2021-09-20 RX ADMIN — ACETAMINOPHEN 650 MG: 325 TABLET, FILM COATED ORAL at 21:58

## 2021-09-20 RX ADMIN — LISINOPRIL 10 MG: 10 TABLET ORAL at 08:41

## 2021-09-20 RX ADMIN — Medication 400 MG: at 15:10

## 2021-09-20 RX ADMIN — ACETAMINOPHEN 650 MG: 325 TABLET, FILM COATED ORAL at 08:41

## 2021-09-20 RX ADMIN — OXYCODONE HYDROCHLORIDE 5 MG: 5 TABLET ORAL at 08:41

## 2021-09-20 RX ADMIN — SULFAMETHOXAZOLE AND TRIMETHOPRIM 1 TABLET: 800; 160 TABLET ORAL at 21:32

## 2021-09-20 RX ADMIN — Medication 400 MG: at 09:26

## 2021-09-20 RX ADMIN — ACETAMINOPHEN 650 MG: 325 TABLET, FILM COATED ORAL at 00:34

## 2021-09-20 RX ADMIN — METFORMIN HYDROCHLORIDE 1000 MG: 500 TABLET, FILM COATED ORAL at 08:41

## 2021-09-20 RX ADMIN — Medication 400 MG: at 21:32

## 2021-09-20 RX ADMIN — METFORMIN HYDROCHLORIDE 1000 MG: 500 TABLET, FILM COATED ORAL at 17:22

## 2021-09-20 RX ADMIN — OXYCODONE HYDROCHLORIDE 2.5 MG: 5 TABLET ORAL at 00:34

## 2021-09-20 RX ADMIN — OXYCODONE HYDROCHLORIDE 5 MG: 5 TABLET ORAL at 21:59

## 2021-09-20 RX ADMIN — INSULIN GLARGINE 30 UNITS: 100 INJECTION, SOLUTION SUBCUTANEOUS at 21:32

## 2021-09-20 RX ADMIN — SULFAMETHOXAZOLE AND TRIMETHOPRIM 1 TABLET: 800; 160 TABLET ORAL at 08:41

## 2021-09-20 RX ADMIN — INSULIN ASPART 1 UNITS: 100 INJECTION, SOLUTION INTRAVENOUS; SUBCUTANEOUS at 08:41

## 2021-09-20 ASSESSMENT — ACTIVITIES OF DAILY LIVING (ADL)
ADLS_ACUITY_SCORE: 3
ADLS_ACUITY_SCORE: 3
ADLS_ACUITY_SCORE: 5
ADLS_ACUITY_SCORE: 3
ADLS_ACUITY_SCORE: 5
ADLS_ACUITY_SCORE: 5

## 2021-09-20 ASSESSMENT — MIFFLIN-ST. JEOR: SCORE: 2003.85

## 2021-09-20 NOTE — PROGRESS NOTES
Care Management Follow Up    Length of Stay (days): 8    Expected Discharge Date: 09/21/2021     Concerns to be Addressed: discharge planning     Patient plan of care discussed at interdisciplinary rounds: Yes    Anticipated Discharge Disposition: Transitional Care     Anticipated Discharge Services:    Anticipated Discharge DME:      Patient/family educated on Medicare website which has current facility and service quality ratings: no  Education Provided on the Discharge Plan:    Patient/Family in Agreement with the Plan: yes    Referrals Placed by CM/SW: Post Acute Facilities  Private pay costs discussed: Not applicable    Additional Information:    Per chart review pending TCUs are:    [facility]:  Left vm this AM in admissions  [facility] [place]:  Left  this AM with admissions  [facility]:  Sent referral     Sw confirmed with [facility] that they have declined.  [facility] has also declined.      Lili Tovar, JUANCHOSW

## 2021-09-20 NOTE — PROGRESS NOTES
[facility]    Medicine Progress Note - Hospitalist Service       Date of Admission:  9/12/2021    Assessment & Plan           Chris Shea is a 50 year old male with hx of poorly-controlled DM2, chronic right foot ulcer, and methamphetamine abuse who was admitted on 9/12/2021 for sepsis due to a large neck abscess     Posterior neck cellulitis and MRSA abscess  Presented with 2 day history of progressive neck pain, redness, and swelling with purulent drainage after picking at his skin. On arrival, he was noted to be tachycardic with leukocytosis to 16.4k. In the ED, he was initiated on IV vancomycin and pip-tazo.    - CT neck showed Ill-defined, moderately extensive inflammatory process involving the posterior upper cervical and occipitocervical soft tissues. No evidence for discrete drainable abscess, bone involvement or deep intraspinal extension.  - General Surgery consulted and s/p I&D 9/14, operative cultures grew MRSA.    - ID consult appreciated, IV vancomycin and pip-tazo continued initially, Zosyn was discontinued once MRSA identified on operative culture.  - 9/12 blood cultures x2 NGTD  - Leukocytosis has resolved, IV vanco changed to bactrim, 10 days of bactrim per ID.  - ?wound vac, requesting surgery to reassess.     Chronic right mid-foot ulcer  2 cm ulcer over plantar aspect of right mid-foot. States he follows at wound clinic through [facility].   - WOCN consult  - Podiatry consulted, s /p excisional debridement of R foot ulcer 9/14      Methamphetamine abuse  Admits to using methamphetamine twice weekly. Last used: 9/10. Denies IVDU. Was  diaphoretic, likely in early withdrawal.  - Psychiatry consulted-they feel he is not withdrawing and has not used frequently.  As needed hydroxyzine ordered, clonidine or benzodiazepine was not recommended.     DM2 with peripheral vascular disease, uncontrolled  BG>400 on arrival. 6/24/21 A1c 13.8. PTA on metformin 1000 mg  BID  -  PTA metformin held and started lantus with premeal novolog and ISS this stay.    - now back on PTA metformin as well   - lantus 30 units and novolog 8 untis TID, BS in 100s today.   - High SSI available  - discussed with patient regarding insulin at discharge, agreeable. Nursing to continue to teach how to inject insulin meanwhile.     Diet: Advance Diet as Tolerated: Regular Diet Adult; 2416-5765 Calories: Moderate Consistent CHO (4-6 CHO units/meal)  Advance Diet as Tolerated    DVT Prophylaxis: Pneumatic Compression Devices  Tam Catheter: Not present  Central Lines: None  Code Status: Full Code      Disposition Plan   Expected discharge: stable for discharge pending availability of TCU     The patient's care was discussed with the Bedside Nurse and Patient.    Luisa Barfield MD  Hospitalist Service  [facility]  Securely message with the Vocera Web Console (learn more here)  Text page via [employer] Paging/Directory      Clinically Significant Risk Factors Present on Admission    ______________________________________________________________________    Interval History   Blood sugars are better 100s today.   Neck pain controlled.  Afebrile  No acute issues reported.  Message left to Gen surgery regarding ?wound vac     Data reviewed today: I reviewed all medications, new labs results over the last 24 hours. I personally reviewed no images or EKG's today.    Physical Exam   Vital Signs: Temp: 97.5  F (36.4  C) Temp src: Oral BP: 109/77 Pulse: 103   Resp: 20 SpO2: 95 % O2 Device: None (Room air)    Weight: 239 lbs 0 oz    Gen: NAD, pleasant  HEENT: PERRLA EOMI. I&D'ed wound in nape of neck with packing, dressing is soaked with serous drainage, surrounding edema and erythema has markedly improved since admission.    Resp: CTA b/l, no dyspnea.   CV: S1S2 regular, no murmur. No tachycardia.   Abdo: soft, nontender, nondistended, bowel sounds present  Ext: calves nontender, well  perfused. Rt foot I&D'ed wound not examined.   Neuro: AAOx3, CN grossly intact, no facial asymmetry      Data   Recent Labs   Lab 09/20/21  0742 09/20/21  0734 09/20/21 0225 09/19/21 2115 09/19/21  1047 09/19/21  1012 09/18/21  1153 09/18/21  1011 09/17/21  0837 09/17/21  0817 09/16/21  1433 09/16/21  0927 09/15/21  1233 09/15/21  0911 09/14/21  0636 09/14/21  0442   WBC  --   --   --   --   --   --   --   --   --   --   --  9.6  --  14.2*  --  21.7*   HGB  --   --   --   --   --   --   --   --   --   --   --  12.2*  --  10.8*  --  12.2*   MCV  --   --   --   --   --   --   --   --   --   --   --  85  --  85  --  82   PLT  --   --   --   --   --   --   --   --   --   --   --  304  --  264  --  289   NA  --   --   --   --   --   --   --   --   --   --   --  139  --  134  --   --    POTASSIUM  --  3.7  --   --   --   --   --   --   --  3.8  --  3.7   < > 4.0   < > 3.8   CHLORIDE  --   --   --   --   --   --   --   --   --   --   --  102  --  102  --   --    CO2  --   --   --   --   --   --   --   --   --   --   --  31  --  30  --   --    BUN  --   --   --   --   --   --   --   --   --   --   --  10  --  10  --   --    CR  --  1.03  --   --   --  1.04  --  0.94   < > 1.01  --  1.14   < > 1.11  --   --    ANIONGAP  --   --   --   --   --   --   --   --   --   --   --  6  --  2*  --   --    ELINOR  --   --   --   --   --   --   --   --   --   --   --  9.1  --  8.2*  --   --    *  --  156* 150*   < >  --    < >  --    < >  --    < > 155*   < > 251*   < >  --     < > = values in this interval not displayed.     No results found for this or any previous visit (from the past 24 hour(s)).

## 2021-09-20 NOTE — PLAN OF CARE
"DATE & TIME: 9/19/21 1900-0730                Cognitive Concerns/ Orientation : A&O x4, calm & cooperative   BEHAVIOR & AGGRESSION TOOL COLOR: Green             ABNL VS/O2: VSS on RA  MOBILITY: Independent, steady, encouraged to wear ortho shoes when OOB.   PAIN MANAGMENT: 7/10 posterior R neck pain, described as aching and a \"weird twinge\" when trying to sleep. PRN Tylenol and Oxy 2.5mg given with relief  DIET: Mod CHO  BOWEL/BLADDER: Continent, up to BR   ABNL LAB/BG:  & 156  DRAIN/DEVICES: No MD JUAN aware   SKIN: Abscess on R side of neck, dressing CDI. R foot wound, dressing CDI.  D/C DATE: Discharge pending TCU placement, SW following.   DISCHARGE BARRIERS: Recent hx of drug use, barrier for TCU placement.   OTHER IMPORTANT INFO: PO Bactrim BID. Insulin administration education in process, pt gave his own insulins with directions one step at a time, getting more comfortable. Contact precautions for MRSA  "

## 2021-09-21 LAB
BACTERIA WND CULT: ABNORMAL
CREAT SERPL-MCNC: 1.19 MG/DL (ref 0.66–1.25)
GFR SERPL CREATININE-BSD FRML MDRD: 70 ML/MIN/1.73M2
GLUCOSE BLDC GLUCOMTR-MCNC: 116 MG/DL (ref 70–99)
GLUCOSE BLDC GLUCOMTR-MCNC: 150 MG/DL (ref 70–99)
GLUCOSE BLDC GLUCOMTR-MCNC: 173 MG/DL (ref 70–99)
GLUCOSE BLDC GLUCOMTR-MCNC: 94 MG/DL (ref 70–99)

## 2021-09-21 PROCEDURE — 250N000013 HC RX MED GY IP 250 OP 250 PS 637: Performed by: PHYSICIAN ASSISTANT

## 2021-09-21 PROCEDURE — 36415 COLL VENOUS BLD VENIPUNCTURE: CPT | Performed by: INTERNAL MEDICINE

## 2021-09-21 PROCEDURE — 82565 ASSAY OF CREATININE: CPT | Performed by: INTERNAL MEDICINE

## 2021-09-21 PROCEDURE — 250N000013 HC RX MED GY IP 250 OP 250 PS 637: Performed by: HOSPITALIST

## 2021-09-21 PROCEDURE — 120N000001 HC R&B MED SURG/OB

## 2021-09-21 PROCEDURE — 250N000012 HC RX MED GY IP 250 OP 636 PS 637: Performed by: HOSPITALIST

## 2021-09-21 PROCEDURE — 99232 SBSQ HOSP IP/OBS MODERATE 35: CPT | Performed by: INTERNAL MEDICINE

## 2021-09-21 RX ADMIN — METFORMIN HYDROCHLORIDE 1000 MG: 500 TABLET, FILM COATED ORAL at 07:48

## 2021-09-21 RX ADMIN — LISINOPRIL 10 MG: 10 TABLET ORAL at 07:48

## 2021-09-21 RX ADMIN — METFORMIN HYDROCHLORIDE 1000 MG: 500 TABLET, FILM COATED ORAL at 18:30

## 2021-09-21 RX ADMIN — OXYCODONE HYDROCHLORIDE 5 MG: 5 TABLET ORAL at 16:40

## 2021-09-21 RX ADMIN — INSULIN ASPART 2 UNITS: 100 INJECTION, SOLUTION INTRAVENOUS; SUBCUTANEOUS at 18:30

## 2021-09-21 RX ADMIN — OXYCODONE HYDROCHLORIDE 5 MG: 5 TABLET ORAL at 05:50

## 2021-09-21 RX ADMIN — ACETAMINOPHEN 650 MG: 325 TABLET, FILM COATED ORAL at 05:50

## 2021-09-21 RX ADMIN — SULFAMETHOXAZOLE AND TRIMETHOPRIM 1 TABLET: 800; 160 TABLET ORAL at 22:42

## 2021-09-21 RX ADMIN — INSULIN GLARGINE 30 UNITS: 100 INJECTION, SOLUTION SUBCUTANEOUS at 22:42

## 2021-09-21 RX ADMIN — SULFAMETHOXAZOLE AND TRIMETHOPRIM 1 TABLET: 800; 160 TABLET ORAL at 07:48

## 2021-09-21 RX ADMIN — ACETAMINOPHEN 650 MG: 325 TABLET, FILM COATED ORAL at 16:40

## 2021-09-21 RX ADMIN — Medication 400 MG: at 22:42

## 2021-09-21 RX ADMIN — Medication 400 MG: at 16:40

## 2021-09-21 RX ADMIN — Medication 400 MG: at 07:48

## 2021-09-21 ASSESSMENT — ACTIVITIES OF DAILY LIVING (ADL)
ADLS_ACUITY_SCORE: 5

## 2021-09-21 NOTE — PROGRESS NOTES
16 beat run of v-tach noted by tele monitor. Patient refusing prn Labetalol, hospital medicine informed. Home Cardizem reordered, will administer upon approval by pharmacy.    Educated on the affect of diabetes delaying wound healing. Patient given hand out to read as a reference and was given oral teaching. Patient seemed receptive.

## 2021-09-21 NOTE — PLAN OF CARE
"DATE & TIME: 9/20/2021 1900-0730             Cognitive Concerns/ Orientation : A&O x4, calm and cooperative  BEHAVIOR & AGGRESSION TOOL COLOR: Green             ABNL VS/O2: VSS on RA  MOBILITY: Independent, steady. Ortho shoe to be worn when out of bed.  PAIN MANAGMENT: 7/10 posterior R neck pain, described as aching and a \"weird twinge\". PRN Tylenol and Oxy 5mg given x1.  DIET: Mod CHO  BOWEL/BLADDER: Continent, up to BR   ABNL LAB/BG:  and 158. Mg 1.5 (currently being replaced),   DRAIN/DEVICES: No PIV, MD aware   SKIN: Abscess on R side of neck, dressing changed CDI. Pt would not let nurse put dressing on wound on the R bottom foot. Yellow drainage draining from neck abscess.  D/C DATE: Discharge pending TCU placement, SW following. TCUs declining pt due to Hx of drug use.  DISCHARGE BARRIERS: Recent hx of drug use, barrier for TCU placement.   OTHER IMPORTANT INFO: PO Bactrim BID. Insulin administration education in process, pt gave his own insulins with directions one step at a time, getting more comfortable. Contact precautions for MRSA in neck wound. Refused routine Covid swab. LS clear, BS active x4.   "

## 2021-09-21 NOTE — PROGRESS NOTES
Care Management Follow Up    Length of Stay (days): 9    Expected Discharge Date: 09/24/2021     Concerns to be Addressed: discharge planning     Patient plan of care discussed at interdisciplinary rounds: Yes    Anticipated Discharge Disposition: Transitional Care     Anticipated Discharge Services:    Anticipated Discharge DME:      Patient/family educated on Medicare website which has current facility and service quality ratings: no  Education Provided on the Discharge Plan:    Patient/Family in Agreement with the Plan: yes    Referrals Placed by CM/SW: Post Acute Facilities  Private pay costs discussed: Not applicable    Additional Information:    Per vm from  TCU, they are following to determine pt's final plan for wound cares.  Per RN and CC, it has been determined that pt is not appropriate for wound vac.  CC to f/u with surgery on plan.  Once final plan determined, Sw to f/u with  TCU.      Lili Tovar, JUANCHOSW

## 2021-09-21 NOTE — PROGRESS NOTES
[facility]    Medicine Progress Note - Hospitalist Service       Date of Admission:  9/12/2021    Assessment & Plan           Chris Shea is a 50 year old male with hx of poorly-controlled DM2, chronic right foot ulcer, and methamphetamine abuse who was admitted on 9/12/2021 for sepsis due to a large neck abscess     Posterior neck cellulitis and MRSA abscess  Presented with 2 day history of progressive neck pain, redness, and swelling with purulent drainage after picking at his skin. On arrival, he was noted to be tachycardic with leukocytosis to 16.4k. In the ED, he was initiated on IV vancomycin and pip-tazo.    - CT neck showed Ill-defined, moderately extensive inflammatory process involving the posterior upper cervical and occipitocervical soft tissues. No evidence for discrete drainable abscess, bone involvement or deep intraspinal extension.  - General Surgery consulted and s/p I&D 9/14, operative cultures grew MRSA.    - ID consult appreciated, IV vancomycin and pip-tazo continued initially, Zosyn was discontinued once MRSA identified on operative culture.  - 9/12 blood cultures x2 NGTD  - Leukocytosis has resolved, IV vanco changed to bactrim, 10 days of bactrim per ID.  -Examined the wound today, patient is under the impression that wound VAC would be improving his symptoms, that wound does not appear that it might need a wound VAC, expecting input from surgery.  Chronic right mid-foot ulcer  2 cm ulcer over plantar aspect of right mid-foot. States he follows at wound clinic through [facility].   - WOCN consult  - Podiatry consulted, s /p excisional debridement of R foot ulcer 9/14      Methamphetamine abuse  Admits to using methamphetamine twice weekly. Last used: 9/10. Denies IVDU. Was  diaphoretic, likely in early withdrawal.  - Psychiatry consulted-they feel he is not withdrawing and has not used frequently.  As needed hydroxyzine ordered, clonidine or  benzodiazepine was not recommended.     DM2 with peripheral vascular disease, uncontrolled  BG>400 on arrival. 6/24/21 A1c 13.8. PTA on metformin 1000 mg BID  -  PTA metformin held and started lantus with premeal novolog and ISS this stay.    - now back on PTA metformin as well   - lantus 30 units and novolog 8 untis TID, BS in 100s today.   - High SSI available  - discussed with patient regarding insulin at discharge, agreeable. Nursing to continue to teach how to inject insulin meanwhile.   Patient refused to repeat Covid swab  Diet: Advance Diet as Tolerated: Regular Diet Adult; 2373-2171 Calories: Moderate Consistent CHO (4-6 CHO units/meal)  Advance Diet as Tolerated    DVT Prophylaxis: Pneumatic Compression Devices  Tam Catheter: Not present  Central Lines: None  Code Status: Full Code      Disposition Plan   Expected discharge: stable for discharge pending availability of TCU, patient currently do not have a home so sitting up home with home care may not be a option.     The patient's care was discussed with the Bedside Nurse and Patient.    Cecy Osuna MD  Hospitalist Service  [facility]  Securely message with the Vocera Web Console (learn more here)  Text page via [employer] Paging/Directory      Clinically Significant Risk Factors Present on Admission    ______________________________________________________________________    Interval History   Neck pain improved, blood sugar stable, patient was inquiring about wound VAC, he had about that from someone else, he wanted me to examine the wound, wound appears to be healing well, his wound is packed with gauze, discussed briefly about wound VAC and that this wound may not qualify for that.  Pending input from general surgery.    Data reviewed today: I reviewed all medications, new labs results over the last 24 hours. I personally reviewed no images or EKG's today.    Physical Exam   Vital Signs: Temp: 97.9  F (36.6  C) Temp src:  Oral BP: 128/83 Pulse: 83   Resp: 18 SpO2: 96 % O2 Device: None (Room air)    Weight: 239 lbs 0 oz    Constitutional: Awake, alert, cooperative, no apparent distress, wound on the back of his neck was examined, appears to be healing well  Respiratory: Clear to auscultation bilaterally, no crackles or wheezing  Cardiovascular: Regular rate and rhythm, normal S1 and S2, and no murmur noted  GI: Normal bowel sounds, soft, non-distended, non-tender  Skin/Integumen: Right foot  dressed, not examined  Neuro : moving all 4 extremities, no focal deficit noted         Data   Recent Labs   Lab 09/21/21  1211 09/21/21  0956 09/21/21  0731 09/20/21  2347 09/20/21  0742 09/20/21  0734 09/19/21  1047 09/19/21  1012 09/17/21  0837 09/17/21  0817 09/16/21  1433 09/16/21  0927 09/15/21  1233 09/15/21  0911   WBC  --   --   --   --   --   --   --   --   --   --   --  9.6  --  14.2*   HGB  --   --   --   --   --   --   --   --   --   --   --  12.2*  --  10.8*   MCV  --   --   --   --   --   --   --   --   --   --   --  85  --  85   PLT  --   --   --   --   --   --   --   --   --   --   --  304  --  264   NA  --   --   --   --   --   --   --   --   --   --   --  139  --  134   POTASSIUM  --   --   --   --   --  3.7  --   --   --  3.8  --  3.7   < > 4.0   CHLORIDE  --   --   --   --   --   --   --   --   --   --   --  102  --  102   CO2  --   --   --   --   --   --   --   --   --   --   --  31  --  30   BUN  --   --   --   --   --   --   --   --   --   --   --  10  --  10   CR  --  1.19  --   --   --  1.03  --  1.04   < > 1.01  --  1.14   < > 1.11   ANIONGAP  --   --   --   --   --   --   --   --   --   --   --  6  --  2*   ELINOR  --   --   --   --   --   --   --   --   --   --   --  9.1  --  8.2*   *  --  94 158*   < >  --    < >  --    < >  --    < > 155*   < > 251*    < > = values in this interval not displayed.     No results found for this or any previous visit (from the past 24 hour(s)).

## 2021-09-22 LAB
ERYTHROCYTE [DISTWIDTH] IN BLOOD BY AUTOMATED COUNT: 13 % (ref 10–15)
GLUCOSE BLDC GLUCOMTR-MCNC: 104 MG/DL (ref 70–99)
GLUCOSE BLDC GLUCOMTR-MCNC: 117 MG/DL (ref 70–99)
GLUCOSE BLDC GLUCOMTR-MCNC: 119 MG/DL (ref 70–99)
GLUCOSE BLDC GLUCOMTR-MCNC: 146 MG/DL (ref 70–99)
GLUCOSE BLDC GLUCOMTR-MCNC: 171 MG/DL (ref 70–99)
GLUCOSE BLDC GLUCOMTR-MCNC: 99 MG/DL (ref 70–99)
HCT VFR BLD AUTO: 37.9 % (ref 40–53)
HGB BLD-MCNC: 12.3 G/DL (ref 13.3–17.7)
MAGNESIUM SERPL-MCNC: 1.8 MG/DL (ref 1.6–2.3)
MCH RBC QN AUTO: 27.4 PG (ref 26.5–33)
MCHC RBC AUTO-ENTMCNC: 32.5 G/DL (ref 31.5–36.5)
MCV RBC AUTO: 84 FL (ref 78–100)
PLATELET # BLD AUTO: 515 10E3/UL (ref 150–450)
POTASSIUM BLD-SCNC: 4.9 MMOL/L (ref 3.4–5.3)
RBC # BLD AUTO: 4.49 10E6/UL (ref 4.4–5.9)
WBC # BLD AUTO: 9.9 10E3/UL (ref 4–11)

## 2021-09-22 PROCEDURE — 84132 ASSAY OF SERUM POTASSIUM: CPT | Performed by: INTERNAL MEDICINE

## 2021-09-22 PROCEDURE — 120N000001 HC R&B MED SURG/OB

## 2021-09-22 PROCEDURE — G0463 HOSPITAL OUTPT CLINIC VISIT: HCPCS

## 2021-09-22 PROCEDURE — 250N000012 HC RX MED GY IP 250 OP 636 PS 637: Performed by: HOSPITALIST

## 2021-09-22 PROCEDURE — 99232 SBSQ HOSP IP/OBS MODERATE 35: CPT | Performed by: INTERNAL MEDICINE

## 2021-09-22 PROCEDURE — 36415 COLL VENOUS BLD VENIPUNCTURE: CPT | Performed by: INTERNAL MEDICINE

## 2021-09-22 PROCEDURE — 83735 ASSAY OF MAGNESIUM: CPT | Performed by: INTERNAL MEDICINE

## 2021-09-22 PROCEDURE — 250N000013 HC RX MED GY IP 250 OP 250 PS 637: Performed by: HOSPITALIST

## 2021-09-22 PROCEDURE — 250N000013 HC RX MED GY IP 250 OP 250 PS 637: Performed by: PHYSICIAN ASSISTANT

## 2021-09-22 PROCEDURE — 85018 HEMOGLOBIN: CPT | Performed by: INTERNAL MEDICINE

## 2021-09-22 RX ADMIN — ACETAMINOPHEN 650 MG: 325 TABLET, FILM COATED ORAL at 13:53

## 2021-09-22 RX ADMIN — OXYCODONE HYDROCHLORIDE 5 MG: 5 TABLET ORAL at 04:54

## 2021-09-22 RX ADMIN — SULFAMETHOXAZOLE AND TRIMETHOPRIM 1 TABLET: 800; 160 TABLET ORAL at 08:13

## 2021-09-22 RX ADMIN — INSULIN GLARGINE 30 UNITS: 100 INJECTION, SOLUTION SUBCUTANEOUS at 22:08

## 2021-09-22 RX ADMIN — METFORMIN HYDROCHLORIDE 1000 MG: 500 TABLET, FILM COATED ORAL at 19:18

## 2021-09-22 RX ADMIN — OXYCODONE HYDROCHLORIDE 5 MG: 5 TABLET ORAL at 13:53

## 2021-09-22 RX ADMIN — ACETAMINOPHEN 650 MG: 325 TABLET, FILM COATED ORAL at 04:54

## 2021-09-22 RX ADMIN — METFORMIN HYDROCHLORIDE 1000 MG: 500 TABLET, FILM COATED ORAL at 08:12

## 2021-09-22 RX ADMIN — LISINOPRIL 10 MG: 10 TABLET ORAL at 08:13

## 2021-09-22 RX ADMIN — SULFAMETHOXAZOLE AND TRIMETHOPRIM 1 TABLET: 800; 160 TABLET ORAL at 22:08

## 2021-09-22 ASSESSMENT — ACTIVITIES OF DAILY LIVING (ADL)
ADLS_ACUITY_SCORE: 3
ADLS_ACUITY_SCORE: 5

## 2021-09-22 NOTE — PROGRESS NOTES
[facility]    Medicine Progress Note - Hospitalist Service       Date of Admission:  9/12/2021    Assessment & Plan           Chris Shea is a 50 year old male with hx of poorly-controlled DM2, chronic right foot ulcer, and methamphetamine abuse who was admitted on 9/12/2021 for sepsis due to a large neck abscess     Posterior neck cellulitis and MRSA abscess  Presented with 2 day history of progressive neck pain, redness, and swelling with purulent drainage after picking at his skin. On arrival, he was noted to be tachycardic with leukocytosis to 16.4k. In the ED, he was initiated on IV vancomycin and pip-tazo.    - CT neck showed Ill-defined, moderately extensive inflammatory process involving the posterior upper cervical and occipitocervical soft tissues. No evidence for discrete drainable abscess, bone involvement or deep intraspinal extension.  - General Surgery consulted and s/p I&D 9/14, operative cultures grew MRSA.    - ID consult appreciated, IV vancomycin and pip-tazo continued initially, Zosyn was discontinued once MRSA identified on operative culture.  - 9/12 blood cultures x2 NGTD  - Leukocytosis has resolved, IV vanco changed to bactrim, 10 days of bactrim per ID.  -Plan for wound VAC as per surgery, pending placement issues due to wound care needs.  Patient needs dressing changes every day twice.  Chronic right mid-foot ulcer  2 cm ulcer over plantar aspect of right mid-foot. States he follows at wound clinic through [facility].   - WOCN consult  - Podiatry consulted, s /p excisional debridement of R foot ulcer 9/14      Methamphetamine abuse  Admits to using methamphetamine twice weekly. Last used: 9/10. Denies IVDU. Was  diaphoretic, likely in early withdrawal.  - Psychiatry consulted-they feel he is not withdrawing and has not used frequently.  As needed hydroxyzine ordered, clonidine or benzodiazepine was not recommended.     DM2 with peripheral vascular  disease, uncontrolled  BG>400 on arrival. 6/24/21 A1c 13.8. PTA on metformin 1000 mg BID  -  PTA metformin held and started lantus with premeal novolog and ISS this stay.    - now back on PTA metformin as well   - lantus 30 units and novolog 8 untis TID, BS in 100s today.   - High SSI available  - discussed with patient regarding insulin at discharge, agreeable. Nursing to continue to teach how to inject insulin meanwhile.   Patient refused to repeat Covid swab  Diet: Advance Diet as Tolerated: Regular Diet Adult; 7089-6987 Calories: Moderate Consistent CHO (4-6 CHO units/meal)  Advance Diet as Tolerated    DVT Prophylaxis: Pneumatic Compression Devices  Tam Catheter: Not present  Central Lines: None  Code Status: Full Code      Disposition Plan   Expected discharge: stable for discharge pending availability of TCU, patient currently do not have a home so sitting up home with home care may not be a option.     The patient's care was discussed with the Bedside Nurse and Patient.    Cecy Osuna MD  Hospitalist Service  [facility]  Securely message with the Vocera Web Console (learn more here)  Text page via [employer] Paging/Directory      Clinically Significant Risk Factors Present on Admission    ______________________________________________________________________    Interval History   Patient has multiple concerns being in the hospital, he want to leave, discussed about the difficulty finding TCU due to his history of drug use in the past, patient denies any recent use, he denies any pain on his neck, I discussed in depth about his wound care needs, he expressed understanding that he will be able to do it by himself and he might need help but he was not able to get any help or support from his family.  Currently he is planning to try to reach [facility] [employer] or [facility] to see if they would help him.    Data reviewed today: I reviewed all medications, new labs results over the last  24 hours. I personally reviewed no images or EKG's today.    Physical Exam   Vital Signs: Temp: 97.9  F (36.6  C) Temp src: Oral BP: 125/80 Pulse: 85   Resp: 18 SpO2: 98 % O2 Device: None (Room air)    Weight: 239 lbs 0 oz    Constitutional: Awake, alert, cooperative, no apparent distress, wound on the back of his neck was examined, appears to be healing well  Respiratory: Clear to auscultation bilaterally, no crackles or wheezing  Cardiovascular: Regular rate and rhythm, normal S1 and S2, and no murmur noted  GI: Normal bowel sounds, soft, non-distended, non-tender  Skin/Integumen: Right foot  dressed, not examined  Neuro : moving all 4 extremities, no focal deficit noted         Data   Recent Labs   Lab 09/22/21  1205 09/22/21  0808 09/22/21  0735 09/22/21  0213 09/21/21  1211 09/21/21  0956 09/20/21  0742 09/20/21  0734 09/19/21  1047 09/19/21  1012 09/17/21  0837 09/17/21  0817 09/16/21  1433 09/16/21  0927   WBC  --  9.9  --   --   --   --   --   --   --   --   --   --   --  9.6   HGB  --  12.3*  --   --   --   --   --   --   --   --   --   --   --  12.2*   MCV  --  84  --   --   --   --   --   --   --   --   --   --   --  85   PLT  --  515*  --   --   --   --   --   --   --   --   --   --   --  304   NA  --   --   --   --   --   --   --   --   --   --   --   --   --  139   POTASSIUM  --  4.9  --   --   --   --   --  3.7  --   --   --  3.8  --  3.7   CHLORIDE  --   --   --   --   --   --   --   --   --   --   --   --   --  102   CO2  --   --   --   --   --   --   --   --   --   --   --   --   --  31   BUN  --   --   --   --   --   --   --   --   --   --   --   --   --  10   CR  --   --   --   --   --  1.19  --  1.03  --  1.04   < > 1.01  --  1.14   ANIONGAP  --   --   --   --   --   --   --   --   --   --   --   --   --  6   ELINOR  --   --   --   --   --   --   --   --   --   --   --   --   --  9.1   GLC 99  --  117* 171*   < >  --    < >  --    < >  --    < >  --    < > 155*    < > = values in this interval not  displayed.     No results found for this or any previous visit (from the past 24 hour(s)).

## 2021-09-22 NOTE — PLAN OF CARE
"DATE & TIME: 9/22/2021 0450-1460  Cognitive Concerns/ Orientation : A&O x4, calm and cooperative.  BEHAVIOR & AGGRESSION TOOL COLOR: Green             ABNL VS/O2: VSS on RA  MOBILITY: Independent, steady. Ortho shoe to be worn when out of bed.  PAIN MANAGMENT: R neck pain, PRN Tylenol and Oxycodone Available given x1 together.  DIET: Mod CHO  BOWEL/BLADDER: Continent, up to BR   ABNL LAB/BG: , 99, 146, Plts 515  DRAIN/DEVICES: No PIV, MD aware   SKIN: Tunneled wound on R side of neck, packing dressing changed x2; CDI. No dressing to R foot; pt refuses; states \"healing well\".   D/C DATE: Discharge pending TCU placement, SW following. TCUs declining pt due to Hx of drug use.  DISCHARGE BARRIERS: Recent hx of drug use, barrier for TCU placement.   OTHER IMPORTANT INFO: PO Bactrim BID. Contact precautions for MRSA in neck wound. Refused routine Covid swab. Pt requesting more \"aggressive\" tx than what is being done. Pt reports baseline numbness and tingling in feet. IS use encouraged. LS clear, BS active x4.   "

## 2021-09-22 NOTE — PROGRESS NOTES
"[facility] Nurse Inpatient Wound Assessment   Reason for consultation: Re-evaluate and treat  R foot wounds    Assessment  R foot wounds due to Diabetic Ulcer with I&D performed by podiatry on 9/14/21  Status: healing and improving    Treatment Plan  R foot wounds: Daily  and PRN   1. Cleanse with Vashe (716395). Pat dry.  2. Apply Cavilon No Sting Barrier Film to periwound skin  2. Moisten NuGauze (546166) with VASHE and lightly fill wound bed.  3. Cover with 2x4 PolyMem Foam ([id number]).  4. Time and Date  5. Ensure wearing orthotic when up.    Orders Reviewed  Recommended provider order: None, at this time  [facility] Nurse follow-up plan:weekly  Nursing to notify the Provider(s) and re-consult the [facility] Nurse if wound(s) deteriorates or new skin concern.    Patient History  According to provider note(s):         \"Chris Shea is a 50 year old male with hx of poorly-controlled DM2, chronic right foot ulcer, and methamphetamine abuse who was admitted on 9/12/2021 for sepsis due to a large neck abscess   Posterior neck cellulitis  Chronic right mid-foot ulcer  2 cm ulcer over plantar aspect of right mid-foot. States he follows at wound clinic through [facility].   - WOCN consult  - Podiatry consulted,s /p excisional debridement of R foot ulcer 9/14      Methamphetamine abuse  DM2 with peripheral vascular disease, uncontrolled\"  Objective Data  Containment of urine/stool: Continent of bladder and Continent of bowel    Active Diet Order  Orders Placed This Encounter      Advance Diet as Tolerated: Regular Diet Adult; 9999-6230 Calories: Moderate Consistent CHO (4-6 CHO units/meal)      Advance Diet as Tolerated      Output:   I/O last 3 completed shifts:  In: 720 [P.O.:720]  Out: -     Risk Assessment:   Sensory Perception: 4-->no impairment  Moisture: 3-->occasionally moist  Activity: 3-->walks occasionally  Mobility: 4-->no limitation  Nutrition: 4-->excellent  Friction and Shear: 3-->no apparent problem  Mio Score: " "21                          Labs:   Recent Labs   Lab 09/22/21  0808   HGB 12.3*   WBC 9.9       Physical Exam  Areas of skin assessed: focused R plantar foot    Wound Location:  R plantar foot  Date of last photo 9/22/21 9-22 with flash    9-22 without flash         Wound History: Patient with Chronic R foot ulcer that had excisional debridement by podiatry 9/14/21. Reports \"I was wearing work boots that were probably 3 sizes too small, just trying to get by on the day to day.\"     Wound Base: 100 % non-granular tissue. Red and dry      Palpation of the wound bed: normal      Drainage: none, but patient found not wearing dressing with \"clean socks\" in use at start of consult     Description of drainage: none     Measurements (length x width x depth, in cm) 0.6  x 0.6  x  0.4 cm      Tunneling N/A     Undermining N/A  Periwound skin: intact, hyperkeratosis, indurated and calloused      Color: normal and consistent with surrounding tissue and with iodine staining      Temperature: normal   Odor: none  Pain: absent and denies , none       Interventions  Visual inspection and assessment completed  Wound Care Rationale Protect periwound skin, Promote moist wound healing without tissue dehydration , Gently fill dead space to prevent abscess formation  and Decrease bacterial load  Wound Care: done per plan of care  Supplies: at bedside  Current off-loading measures: Pillows under calves and Pillows  Current support surface: Standard  Atmos Air mattress  Education provided to: plan of care and Off-loading pressure  Discussed plan of care with Patient and Nurse    Elva PAEZ         "

## 2021-09-22 NOTE — PLAN OF CARE
DATE & TIME: 9/21/2021 9170-5445            Cognitive Concerns/ Orientation : A&O x4, calm and cooperative  BEHAVIOR & AGGRESSION TOOL COLOR: Green             ABNL VS/O2: VSS on RA  MOBILITY: Independent, steady. Ortho shoe to be worn when out of bed.  PAIN MANAGMENT: R neck pain, PRN Tylenol and Oxy 5mg givenX1  DIET: Mod CHO  BOWEL/BLADDER: Continent, up to BR   ABNL LAB/BG: BG 97/116. Mg 1.5 (currently PO replaced),   DRAIN/DEVICES: No PIV, MD aware   SKIN: tunneled wound on R side of neck, packing dressing changed CDI. Pt would not let nurse put dressing on wound on the R bottom foot. Yellow drainage draining from neck abscess.  D/C DATE: Discharge pending TCU placement, SW following. TCUs declining pt due to Hx of drug use.  DISCHARGE BARRIERS: Recent hx of drug use, barrier for TCU placement.   OTHER IMPORTANT INFO: PO Bactrim BID. Insulin administration education in process, pt gave his own insulins with directions one step at a time, getting more comfortable. Contact precautions for MRSA in neck wound. Refused routine Covid swab, feels pretty strongly about it (states that would do it if symptomatic) LS clear, BS active x4.

## 2021-09-22 NOTE — PLAN OF CARE
"DATE & TIME: 9/22/2021 Night shift  Cognitive Concerns/ Orientation : A&O x4, calm and cooperative  BEHAVIOR & AGGRESSION TOOL COLOR: Green             ABNL VS/O2: VSS on RA  MOBILITY: Independent, steady. Ortho shoe to be worn when out of bed.  PAIN MANAGMENT: R neck pain, PRN Tylenol and Oxy 5mg givenX1  DIET: Mod CHO  BOWEL/BLADDER: Continent, up to BR   ABNL LAB/BG: .   DRAIN/DEVICES: No PIV, MD aware   SKIN: tunneled wound on R side of neck, packing dressing changed CDI. No dressing to R foot; pt refuses; states \"healing well\".   D/C DATE: Discharge pending TCU placement, SW following. TCUs declining pt due to Hx of drug use.  DISCHARGE BARRIERS: Recent hx of drug use, barrier for TCU placement.   OTHER IMPORTANT INFO: PO Bactrim BID. Contact precautions for MRSA in neck wound. Refused routine Covid swab  "

## 2021-09-22 NOTE — PROGRESS NOTES
Surgery    No new complaints.   Posterior neck wound dressing changed recently.   Continue dressing changes.  Challenging discharge planning.   Wound care instructions in epic.    Bobby Deshpande PA-C

## 2021-09-22 NOTE — PROGRESS NOTES
Care Management Follow Up    Length of Stay (days): 10    Expected Discharge Date: 09/24/2021     Concerns to be Addressed: discharge planning     Patient plan of care discussed at interdisciplinary rounds: Yes    Anticipated Discharge Disposition: Transitional Care     Anticipated Discharge Services:    Anticipated Discharge DME:      Patient/family educated on Medicare website which has current facility and service quality ratings: no  Education Provided on the Discharge Plan:    Patient/Family in Agreement with the Plan: yes    Referrals Placed by CM/SW: Post Acute Facilities  Private pay costs discussed: Not applicable    Additional Information:    Per [facility] is keeping pt on list but has declined for now.  Several previous referrals sent, several declines including with all locations within [facility], [facility], and [facility].  Sw reached out to remaining pending referrals:    [facility]:  No beds available, possible week of 9/27  [facility]:  Attempted to call (vm not working), resent referral    Sw sent referral to [facility], formally known as [facility].    Update:  Sw sent re-referral to [facility] (previously they did not have beds available); unsure on this week's bed availability with this TCU.      Lili Tovar, JUANCHOSW

## 2021-09-23 VITALS
RESPIRATION RATE: 18 BRPM | TEMPERATURE: 98 F | SYSTOLIC BLOOD PRESSURE: 123 MMHG | BODY MASS INDEX: 30.67 KG/M2 | WEIGHT: 239 LBS | HEIGHT: 74 IN | HEART RATE: 99 BPM | OXYGEN SATURATION: 98 % | DIASTOLIC BLOOD PRESSURE: 67 MMHG

## 2021-09-23 LAB
GLUCOSE BLDC GLUCOMTR-MCNC: 102 MG/DL (ref 70–99)
GLUCOSE BLDC GLUCOMTR-MCNC: 124 MG/DL (ref 70–99)
GLUCOSE BLDC GLUCOMTR-MCNC: 139 MG/DL (ref 70–99)
GLUCOSE BLDC GLUCOMTR-MCNC: 76 MG/DL (ref 70–99)

## 2021-09-23 PROCEDURE — 250N000013 HC RX MED GY IP 250 OP 250 PS 637: Performed by: PHYSICIAN ASSISTANT

## 2021-09-23 PROCEDURE — 250N000013 HC RX MED GY IP 250 OP 250 PS 637: Performed by: HOSPITALIST

## 2021-09-23 PROCEDURE — 99239 HOSP IP/OBS DSCHRG MGMT >30: CPT | Performed by: INTERNAL MEDICINE

## 2021-09-23 RX ORDER — ACETAMINOPHEN 500 MG
500 TABLET ORAL EVERY 8 HOURS PRN
Qty: 30 TABLET | Refills: 0 | Status: SHIPPED | OUTPATIENT
Start: 2021-09-23

## 2021-09-23 RX ORDER — AMOXICILLIN 250 MG
1 CAPSULE ORAL 2 TIMES DAILY PRN
Qty: 20 TABLET | Refills: 0 | Status: SHIPPED | OUTPATIENT
Start: 2021-09-23

## 2021-09-23 RX ORDER — LISINOPRIL 10 MG/1
10 TABLET ORAL DAILY
Qty: 30 TABLET | Refills: 0 | Status: ON HOLD | OUTPATIENT
Start: 2021-09-24 | End: 2022-01-02

## 2021-09-23 RX ORDER — OXYCODONE HYDROCHLORIDE 5 MG/1
5 TABLET ORAL 3 TIMES DAILY PRN
Qty: 8 TABLET | Refills: 0 | Status: SHIPPED | OUTPATIENT
Start: 2021-09-23

## 2021-09-23 RX ORDER — SULFAMETHOXAZOLE/TRIMETHOPRIM 800-160 MG
1 TABLET ORAL 2 TIMES DAILY
Qty: 12 TABLET | Refills: 0 | Status: ON HOLD | OUTPATIENT
Start: 2021-09-23 | End: 2021-12-31

## 2021-09-23 RX ORDER — INSULIN ASPART 100 [IU]/ML
INJECTION, SOLUTION INTRAVENOUS; SUBCUTANEOUS
Qty: 15 ML | Refills: 0 | Status: SHIPPED | OUTPATIENT
Start: 2021-09-23

## 2021-09-23 RX ORDER — SULFAMETHOXAZOLE/TRIMETHOPRIM 800-160 MG
1 TABLET ORAL 2 TIMES DAILY
Qty: 12 TABLET | Refills: 0 | Status: SHIPPED | OUTPATIENT
Start: 2021-09-23 | End: 2021-09-23

## 2021-09-23 RX ADMIN — ACETAMINOPHEN 650 MG: 325 TABLET, FILM COATED ORAL at 04:58

## 2021-09-23 RX ADMIN — LISINOPRIL 10 MG: 10 TABLET ORAL at 09:24

## 2021-09-23 RX ADMIN — SULFAMETHOXAZOLE AND TRIMETHOPRIM 1 TABLET: 800; 160 TABLET ORAL at 09:24

## 2021-09-23 RX ADMIN — OXYCODONE HYDROCHLORIDE 5 MG: 5 TABLET ORAL at 04:58

## 2021-09-23 RX ADMIN — METFORMIN HYDROCHLORIDE 1000 MG: 500 TABLET, FILM COATED ORAL at 17:17

## 2021-09-23 RX ADMIN — OXYCODONE HYDROCHLORIDE 5 MG: 5 TABLET ORAL at 17:17

## 2021-09-23 RX ADMIN — ACETAMINOPHEN 650 MG: 325 TABLET, FILM COATED ORAL at 17:17

## 2021-09-23 RX ADMIN — METFORMIN HYDROCHLORIDE 1000 MG: 500 TABLET, FILM COATED ORAL at 09:24

## 2021-09-23 ASSESSMENT — ACTIVITIES OF DAILY LIVING (ADL)
ADLS_ACUITY_SCORE: 3

## 2021-09-23 NOTE — PROGRESS NOTES
Care Management Discharge Note    Discharge Date: 09/23/2021       Discharge Disposition: Shelter  Discharge Services: None     Discharge DME:      Discharge Transportation: Friend     Private pay costs discussed: Not applicable    Education Provided on the Discharge Plan: yes   Persons Notified of Discharge Plans: None  Patient/Family in Agreement with the Plan: yes    Handoff Referral Completed: Yes    Additional Information:  Discussed discharge plans with patient.  Patient stating he has a bed secured at [facility] but will need $42/week which he does not have at present. Discussed about dressing changes 2xday. Patient stating there are several walk in  clinics in [place] that will be able to do dressing changes for patient.Patient will follow up with the [facility] or [facility] clinics. Patient is aware of the importance of follow up with wound care.   Patient requested assistance with payment of 2 weeks at [facility] shelter. We were able to assist patient at this time.   Plan for discharge this evening. Friend will provide transportation.        Yo Parker RN  Inpatient Care Coordinator  [facility]  # [phone]

## 2021-09-23 NOTE — PROGRESS NOTES
[facility]    Medicine Progress Note - Hospitalist Service       Date of Admission:  9/12/2021    Assessment & Plan           Chris Shea is a 50 year old male with hx of poorly-controlled DM2, chronic right foot ulcer, and methamphetamine abuse who was admitted on 9/12/2021 for sepsis due to a large neck abscess     Posterior neck cellulitis and MRSA abscess  Presented with 2 day history of progressive neck pain, redness, and swelling with purulent drainage after picking at his skin. On arrival, he was noted to be tachycardic with leukocytosis to 16.4k. In the ED, he was initiated on IV vancomycin and pip-tazo.    - CT neck showed Ill-defined, moderately extensive inflammatory process involving the posterior upper cervical and occipitocervical soft tissues. No evidence for discrete drainable abscess, bone involvement or deep intraspinal extension.  - General Surgery consulted and s/p I&D 9/14, operative cultures grew MRSA.    - ID consult appreciated, IV vancomycin and pip-tazo continued initially, Zosyn was discontinued once MRSA identified on operative culture.  - 9/12 blood cultures x2 NGTD  - Leukocytosis has resolved, IV vanco changed to bactrim, 10 days of bactrim per ID.  -[facility] has no plan for wound VAC placement, pending placement issues due to wound care needs.  Patient needs dressing changes every day twice.  And is homeless and does not have a setting where he can get the dressing changes done twice, he is trying to arrange it himself.  TCU placement is not easy due to history of drug use in the past.  Chronic right mid-foot ulcer  2 cm ulcer over plantar aspect of right mid-foot. States he follows at wound clinic through [facility].   - WOCN consulted  - Podiatry consulted, s /p excisional debridement of R foot ulcer 9/14      Methamphetamine abuse  Admits to using methamphetamine twice weekly. Last used: 9/10. Denies IVDU. Was  diaphoretic, likely in early  withdrawal.  - Psychiatry consulted-they feel he is not withdrawing and has not used frequently.  As needed hydroxyzine ordered, clonidine or benzodiazepine was not recommended.     DM2 with peripheral vascular disease, uncontrolled  BG>400 on arrival. 6/24/21 A1c 13.8. PTA on metformin 1000 mg BID  -  PTA metformin held and started lantus with premeal novolog and ISS this stay.    - now back on PTA metformin as well   - lantus 30 units and novolog 8 untis TID, BS in 100s today.   - High SSI available  - discussed with patient regarding insulin at discharge, agreeable. Nursing to continue to teach how to inject insulin meanwhile.   Patient refused to repeat Covid swab  Diet: Advance Diet as Tolerated: Regular Diet Adult; 3982-9442 Calories: Moderate Consistent CHO (4-6 CHO units/meal)  Advance Diet as Tolerated    DVT Prophylaxis: Pneumatic Compression Devices  Tam Catheter: Not present  Central Lines: None  Code Status: Full Code      Disposition Plan   Expected discharge: stable for discharge pending availability of TCU, patient currently do not have a home so setting up home with home care may not be a option.     The patient's care was discussed with the Bedside Nurse and Patient.    Cecy Osuna MD  Hospitalist Service  [facility]  Securely message with the [email or web address] Web Console (learn more here)  Text page via [employer] Paging/Directory      Clinically Significant Risk Factors Present on Admission    ______________________________________________________________________    Interval History   He denies any change in his symptoms, denies any pain, he mentions that he is trying to get some phone numbers and arrange dressing changes in the [facility] clinic while he stays in a homeless shelter.  This was discussed with care coordinators.    Data reviewed today: I reviewed all medications, new labs results over the last 24 hours. I personally reviewed no images or EKG's  today.    Physical Exam   Vital Signs: Temp: 97.9  F (36.6  C) Temp src: Oral BP: 128/82 Pulse: 89   Resp: 18 SpO2: 99 % O2 Device: None (Room air)    Weight: 239 lbs 0 oz    Constitutional: Awake, alert, cooperative, no apparent distress, wound on the back of his neck was examined, appears to be healing well  Respiratory: Clear to auscultation bilaterally, no crackles or wheezing  Cardiovascular: Regular rate and rhythm, normal S1 and S2, and no murmur noted  GI: Normal bowel sounds, soft, non-distended, non-tender  Skin/Integumen: Right foot  dressed, not examined  Neuro : moving all 4 extremities, no focal deficit noted         Data   Recent Labs   Lab 09/23/21  1157 09/23/21  0740 09/23/21  0221 09/22/21  1205 09/22/21  0808 09/21/21  1211 09/21/21  0956 09/20/21  0742 09/20/21  0734 09/19/21  1047 09/19/21  1012 09/17/21  0837 09/17/21  0817   WBC  --   --   --   --  9.9  --   --   --   --   --   --   --   --    HGB  --   --   --   --  12.3*  --   --   --   --   --   --   --   --    MCV  --   --   --   --  84  --   --   --   --   --   --   --   --    PLT  --   --   --   --  515*  --   --   --   --   --   --   --   --    POTASSIUM  --   --   --   --  4.9  --   --   --  3.7  --   --   --  3.8   CR  --   --   --   --   --   --  1.19  --  1.03  --  1.04   < > 1.01   * 139* 124*   < >  --    < >  --    < >  --    < >  --    < >  --     < > = values in this interval not displayed.     No results found for this or any previous visit (from the past 24 hour(s)).

## 2021-09-23 NOTE — PLAN OF CARE
Discharge    Patient discharged to shelter with friend  Care plan note:  Diabetes education done with patient.   Last blood glucose reading was 76.   Foot and neck dressing changes done prior to discharge.   Follow up with [facility] or [facility] clinics for dressing changes.    Listed belongings gathered and given to patient (including from security/pharmacy). Yes  Care Plan and Patient education resolved: Yes  Prescriptions if needed, hard copies sent with patient  Yes  Medication Bin checked and emptied on discharge Yes  SW/care coordinator/charge RN aware of discharge: Yes

## 2021-09-23 NOTE — PLAN OF CARE
DATE & TIME: 9/23/21 (2687-4721)  Cognitive Concerns/ Orientation : A & Ox 4, calm and cooperative  BEHAVIOR & AGGRESSION TOOL COLOR: Green    CIWA SCORE: NA    ABNL VS/O2: VSS on RA   MOBILITY: independent, steady, ortho shoe to be worn when OOB  PAIN MANAGMENT: Denies pain  DIET: Mod CHO, tolerating   BOWEL/BLADDER: BS active x 4, continent, up to BR   ABNL LAB/BG:  & 105  DRAIN/DEVICES: no IV  TELEMETRY RHYTHM: NA   SKIN: tunneled wound on R. Side of neck (packing dressing, small amount of yellow drainage-changed dressing, CDI), R. Foot ulcer (dressing CDI)   TESTS/PROCEDURES: NA   D/C DATE: Today  Discharge Barriers:  OTHER IMPORTANT INFO: PO bactrim BID; contact precautions maintained (MRSA in neck wound), baseline numbness/tingling in feet

## 2021-09-23 NOTE — DISCHARGE SUMMARY
[facility]    Discharge Summary  Hospitalist    Date of Admission:  9/12/2021  Date of Discharge:  9/23/2021  Discharging Provider: Cecy Osuna MD    Discharge Diagnoses   Cellulitis and abscess of neck    History of Present Illness   Please review admission history and physical.    Hospital Course   Chris Shea was admitted on 9/12/2021.  The following problems were addressed during his hospitalization:    Active Problems:    Cellulitis and abscess of neck    Ulcer of right foot, unspecified ulcer stage (H)    Acute sepsis (H)  Chris Shea is a 50 year old male with hx of poorly-controlled DM2, chronic right foot ulcer, and methamphetamine abuse who was admitted on 9/12/2021 for sepsis due to a large neck abscess     Posterior neck cellulitis and MRSA abscess  Presented with 2 day history of progressive neck pain, redness, and swelling with purulent drainage after picking at his skin. On arrival, he was noted to be tachycardic with leukocytosis to 16.4k. In the ED, he was initiated on IV vancomycin and pip-tazo.     - CT neck showed Ill-defined, moderately extensive inflammatory process involving the posterior upper cervical and occipitocervical soft tissues. No evidence for discrete drainable abscess, bone involvement or deep intraspinal extension.  - General Surgery consulted and s/p I&D 9/14, operative cultures grew MRSA.    - ID consult appreciated, IV vancomycin and pip-tazo continued initially, Zosyn was discontinued once MRSA identified on operative culture.  - 9/12 blood cultures x2 NGTD  - Leukocytosis has resolved, IV vanco changed to bactrim, 10 days of bactrim per ID.  -Surgery has no plan for wound VAC placement, pending placement issues due to wound care needs.  Patient needs dressing changes every day twice.    has made arrangements for him to continue his wound care, patient wanted to be discharged from the hospital, plan to discharge him with  surgery follow-up.  Chronic right mid-foot ulcer  2 cm ulcer over plantar aspect of right mid-foot. States he follows at wound clinic through [facility].  Here he was seen by wound care, podiatry consulted, status post excisional debridement of right foot ulcer on 9/14.        Methamphetamine abuse  Admits to using methamphetamine twice weekly. Last used: 9/10. Denies IVDU. Was  diaphoretic, likely in early withdrawal.  - Psychiatry consulted-they feel he is not withdrawing and has not used frequently.  As needed hydroxyzine ordered, clonidine or benzodiazepine was not recommended.     DM2 with peripheral vascular disease, uncontrolled  BG>400 on arrival. 6/24/21 A1c 13.8. PTA on metformin 1000 mg BID  -  PTA metformin held and started lantus with premeal novolog and ISS this stay.    PTA Metformin was restarted, Lantus dose was adjusted here with premeal added, he was educated regarding insulin and will be discharged with supplies.    Patient refused to repeat Covid swab        Cecy Osuna MD    Significant Results and Procedures       Pending Results     Unresulted Labs Ordered in the Past 30 Days of this Admission     No orders found from 8/13/2021 to 9/13/2021.          Code Status   Full Code       Primary Care Physician   Sang Edmondson    Physical Exam   Temp: 97.9  F (36.6  C) Temp src: Oral BP: 128/82 Pulse: 89   Resp: 18 SpO2: 99 % O2 Device: None (Room air)    Vitals:    09/12/21 1817 09/20/21 0910   Weight: 113.4 kg (250 lb) 108.4 kg (239 lb)     Vital Signs with Ranges  Temp:  [97.9  F (36.6  C)-98.1  F (36.7  C)] 97.9  F (36.6  C)  Pulse:  [89-96] 89  Resp:  [16-18] 18  BP: (111-129)/(75-82) 128/82  SpO2:  [95 %-99 %] 99 %  I/O last 3 completed shifts:  In: 720 [P.O.:720]  Out: -     The patient was examined on the day of discharge.    Discharge Disposition   Discharged to home  Condition at discharge: Stable    Consultations This Hospital Stay   PHARMACY TO DOSE VANCO  SURGERY GENERAL IP  CONSULT  WOUND OSTOMY CONTINENCE NURSE  IP CONSULT  PHARMACY TO DOSE VANCO  INFECTIOUS DISEASES IP CONSULT  PODIATRY IP CONSULT  PSYCHIATRY IP CONSULT  ORTHOSIS EXTREMITY LOWER REFERRAL IP CONSULT  WOUND OSTOMY CONTINENCE NURSE  IP CONSULT  PHARMACY TO DOSE VANCO  CARE MANAGEMENT / SOCIAL WORK IP CONSULT    Time Spent on this Encounter   I, Cecy Osuna MD, personally saw the patient today and spent greater than 30 minutes discharging this patient.    Discharge Orders      WOUND CARE SUPPLIES    Saline, gauze, fluffs, q tip applicator, abd and tape     Activity    Activity as tolerated in the DH2/ off loading shoe     Dressing    Wound care for posterior neck: Remove packing and shower, do not soak in a tub. You may also use saline or Microkleenz to cleanse wound as needed. Use the q tip applicator to insert gauze or fluff into the wound clockwise, and apply gauze or abd and tape over the wound. Packing changes daily.     Wound cares and dressing choice per the recommendations of the [facility] RN team.     Follow Up    Follow up with Dr. Dodson for your neck wound in 2 weeks. Call [phone] to schedule an appointment or if you have any concerns. We are located at [address] [place] [address].       Follow-up with [facility] where he is established versus follow-up with [facility] podiatry in 3 to 4 weeks.    *PLEASE CALL [phone] TO SCHEDULE*  Location openings are subject to change due to COVID-19  --------------------------------------------------------------------------  DR. DANETTE LANIER  [place]  [address]  [place] [address] [phone]    [facility]  [address]  [place] [address] [phone]  --------------------------------------------------------------------------  DR. SHAYY PITTS  [facility]  [address]     [phone]        [address]  [place]  [address]    --------------------------------------------------------------------------      [place], [place]     [phone]     [id number]  ---------------------------------------------------------------------------  DR. ELAINE LÓPEZ  [place]  [address]  [place], [place] [address] [phone]     General info for SNF    Length of Stay Estimate: Short Term Care: Estimated # of Days <30  Condition at Discharge: Improving  Level of care:skilled   Rehabilitation Potential: Good  Admission H&P remains valid and up-to-date: Yes  Recent Chemotherapy: N/A  Use Nursing Home Standing Orders: Yes     Mantoux instructions    Give two-step Mantoux (PPD) Per Facility Policy Yes     Follow Up and recommended labs and tests    Follow up with long term physician.  The following labs/tests are recommended: BMP in 5 days.     Reason for your hospital stay    MRSA abscess     Glucose monitor nursing POCT    Before meals and at bedtime     Activity - Up ad raven     Full Code     Contact Isolation    Given MRSA     Advance Diet as Tolerated    Follow this diet upon discharge: Orders Placed This Encounter      Advance Diet as Tolerated: Regular Diet Adult; 9790-7794 Calories: Moderate Consistent CHO (4-6 CHO units/meal)     Discharge Medications   Current Discharge Medication List      START taking these medications    Details   acetaminophen (TYLENOL) 500 MG tablet Take 2 tablets (1,000 mg) by mouth every 8 hours as needed for other (mild to moderate pain)    Associated Diagnoses: Cellulitis and abscess of neck      Alcohol Swabs PADS Use to swab the area of the injection or abilio as directed. Per insurance coverage  Qty: 100 each, Refills: 0    Comments: Future refills by PCP Dr. Sang Edmondson with phone number [phone].  Associated Diagnoses: Type 2 diabetes mellitus with hyperglycemia, unspecified whether long term insulin use (H)      blood glucose (NO BRAND SPECIFIED) lancets standard To use to test glucose level in  the blood. Use to test blood sugar  3  times daily as directed. To accompany glucose monitor brands per insurance coverage.  Qty: 100 each, Refills: 0    Comments: Future refills by PCP Dr. Sang Edmondson with phone number [phone].  Associated Diagnoses: Type 2 diabetes mellitus with hyperglycemia, unspecified whether long term insulin use (H)      blood glucose (NO BRAND SPECIFIED) test strip To use to test glucose level in the blood. Use to test blood sugar  3 times daily as directed. To accompany glucose monitor brands per insurance coverage.  Qty: 100 strip, Refills: 0    Comments: Future refills by PCP Dr. Sang Edmondson with phone number [phone].  Associated Diagnoses: Type 2 diabetes mellitus with hyperglycemia, unspecified whether long term insulin use (H)      blood glucose calibration (NO BRAND SPECIFIED) solution Used to calibrate the blood glucose monitor as needed and as directed.  To accompany  blood glucose brands per insurance coverage  Qty: 1 each, Refills: 0    Comments: Future refills by PCP Dr. Sang Edmondson with phone number [phone].  Associated Diagnoses: Type 2 diabetes mellitus with hyperglycemia, unspecified whether long term insulin use (H)      blood glucose monitoring (NO BRAND SPECIFIED) meter device kit Use as directed. Per insurance coverage  Qty: 1 kit, Refills: 0    Comments: Future refills by PCP Dr. Sang Edmondson with phone number [phone].  Associated Diagnoses: Type 2 diabetes mellitus with hyperglycemia, unspecified whether long term insulin use (H)      hydrOXYzine (ATARAX) 25 MG tablet Take 1 tablet (25 mg) by mouth 3 times daily as needed for anxiety    Associated Diagnoses: Anxiety      !! insulin aspart (NOVOLOG FLEXPEN) 100 UNIT/ML pen Inject 6 Units Subcutaneous 3 times daily (with meals)  Qty: 15 mL, Refills: 0    Associated Diagnoses: Type 2 diabetes mellitus with hyperglycemia, unspecified whether long term insulin use (H)      !! insulin aspart (NOVOLOG FLEXPEN)  100 UNIT/ML pen Novolog Flexpen  Give before meals and before bed:  For Pre-Meal Glucose:  140-189 give 1 unit   190-239 give 2 units   240-289 give 3 units   290-339 give 4 units   = or >340 give 5 units     For Bedtime Glucose  200 - 239 give 1 units   240 - 289 give 1.5 units  290 - 339 give 2 units  = or >340 give 2.5 units  Qty: 15 mL, Refills: 0    Associated Diagnoses: Type 2 diabetes mellitus with hyperglycemia, unspecified whether long term insulin use (H)      !! insulin aspart (NOVOLOG PEN) 100 UNIT/ML pen Inject 8 Units Subcutaneous 3 times daily (with meals)  Qty: 3 mL, Refills: 3    Associated Diagnoses: Type 2 diabetes mellitus with hyperglycemia, unspecified whether long term insulin use (H)      insulin glargine (LANTUS SOLOSTAR) 100 UNIT/ML pen Inject 20 Units Subcutaneous At Bedtime Lantus Solostar Pen  Qty: 15 mL, Refills: 0    Comments: If Lantus is not covered by insurance, may substitute Basaglar at same dose and frequency.    Associated Diagnoses: Type 2 diabetes mellitus with hyperglycemia, unspecified whether long term insulin use (H)      insulin pen needle (31G X 5 MM) 31G X 5 MM miscellaneous Use as directed by provider. Per insurance coverage  Qty: 100 each, Refills: 0    Comments: Future refills by PCP Dr. Sang Edmondson with phone number [phone].  Associated Diagnoses: Type 2 diabetes mellitus with hyperglycemia, unspecified whether long term insulin use (H)      !! lisinopril (ZESTRIL) 10 MG tablet Take 1 tablet (10 mg) by mouth daily  Qty: 30 tablet, Refills: 0    Associated Diagnoses: Essential hypertension      !! lisinopril (ZESTRIL) 5 MG tablet Take 1 tablet (5 mg) by mouth daily    Associated Diagnoses: Essential hypertension      oxyCODONE (ROXICODONE) 5 MG tablet Take 1 tablet (5 mg) by mouth 3 times daily as needed for moderate to severe pain (use as second choice to tylenol if pain not relieved with tyelnol.)  Qty: 21 tablet    Associated Diagnoses: Cellulitis and abscess  of neck      senna-docusate (SENOKOT-S/PERICOLACE) 8.6-50 MG tablet Take 1 tablet by mouth 2 times daily as needed for constipation    Associated Diagnoses: Drug-induced constipation      Sharps Container MISC Use as directed to dispose of needles, lancets and other sharps. Per Insurance coverage  Qty: 1 each, Refills: 0    Comments: Future refills by PCP Dr. Sang Edmondson with phone number [phone].  Associated Diagnoses: Type 2 diabetes mellitus with hyperglycemia, unspecified whether long term insulin use (H)      !! sulfamethoxazole-trimethoprim (BACTRIM DS) 800-160 MG tablet Take 1 tablet by mouth 2 times daily for 6 days  Qty: 12 tablet, Refills: 0    Associated Diagnoses: Acute sepsis (H)      !! sulfamethoxazole-trimethoprim (BACTRIM DS) 800-160 MG tablet Take 1 tablet by mouth 2 times daily for 10 days  Qty: 20 tablet    Associated Diagnoses: Cellulitis and abscess of neck       !! - Potential duplicate medications found. Please discuss with provider.      CONTINUE these medications which have NOT CHANGED    Details   metFORMIN (GLUCOPHAGE) 500 MG tablet Take 1,000 mg by mouth 2 times daily         STOP taking these medications       ibuprofen (ADVIL/MOTRIN) 200 MG tablet Comments:   Reason for Stopping:             Allergies   No Known Allergies  Data   Most Recent 3 CBC's:Recent Labs   Lab Test 09/22/21  0808 09/16/21  0927 09/15/21  0911   WBC 9.9 9.6 14.2*   HGB 12.3* 12.2* 10.8*   MCV 84 85 85   * 304 264      Most Recent 3 BMP's:  Recent Labs   Lab Test 09/23/21  1157 09/23/21  0740 09/23/21  0221 09/22/21  1205 09/22/21  0808 09/21/21  1211 09/21/21  0956 09/20/21  0742 09/20/21  0734 09/19/21  1047 09/19/21  1012 09/17/21  0837 09/17/21  0817 09/16/21  1433 09/16/21  0927 09/15/21  1233 09/15/21  0911 09/13/21  1253 09/13/21  0917   NA  --   --   --   --   --   --   --   --   --   --   --   --   --   --  139  --  134  --  135   POTASSIUM  --   --   --   --  4.9  --   --   --  3.7  --   --    --  3.8  --  3.7   < > 4.0   < > 4.2   CHLORIDE  --   --   --   --   --   --   --   --   --   --   --   --   --   --  102  --  102  --  102   CO2  --   --   --   --   --   --   --   --   --   --   --   --   --   --  31  --  30  --  24   BUN  --   --   --   --   --   --   --   --   --   --   --   --   --   --  10  --  10  --  15   CR  --   --   --   --   --   --  1.19  --  1.03  --  1.04   < > 1.01  --  1.14   < > 1.11   < > 1.15   ANIONGAP  --   --   --   --   --   --   --   --   --   --   --   --   --   --  6  --  2*  --  9   ELINOR  --   --   --   --   --   --   --   --   --   --   --   --   --   --  9.1  --  8.2*  --  8.5   * 139* 124*   < >  --    < >  --    < >  --    < >  --    < >  --    < > 155*   < > 251*   < > 314*    < > = values in this interval not displayed.     Most Recent 2 LFT's:  Recent Labs   Lab Test 09/12/21  1820   AST 7   ALT 13   ALKPHOS 178*   BILITOTAL 0.6     Most Recent INR's and Anticoagulation Dosing History:  Anticoagulation Dose History    There is no flowsheet data to display.       Most Recent 3 Troponin's:No lab results found.  Most Recent Cholesterol Panel:No lab results found.  Most Recent 6 Bacteria Isolates From Any Culture (See EPIC Reports for Culture Details):No lab results found.  Most Recent TSH, T4 and A1c Labs:  Recent Labs   Lab Test 09/12/21  1820   A1C 13.3*     Results for orders placed or performed during the hospital encounter of 09/12/21   Soft tissue neck CT w contrast    Narrative    CT SCAN OF THE NECK WITH CONTRAST  9/12/2021 7:45 PM     HISTORY: Neck abscess, deep tissue. Infection to back of neck.    TECHNIQUE: Axial CT images of the neck following administration of  intravenous contrast with reformations. Radiation dose for this scan  was reduced using automated exposure control, adjustment of the mA  and/or kV according to patient size, or iterative reconstruction  technique. 80mL Isovue-370 IV.     COMPARISON: None.     FINDINGS: Swelling with  extensive edema and soft tissue induration  noted centered in the posterior occipital, suboccipital and midline  posterior cervical regions. This extends from about the external  occipital protuberance superiorly and inferiorly to the soft tissues  at the C5 level. Cutaneous and subcutaneous induration noted and there  is swelling and edema involving the posterior paraspinous musculature  and musculature extending to the occipital scalp. Soft tissue swelling  extends laterally and anteriorly adjacent to the mid and upper  portions of the sternocleidomastoid muscles. No evidence for discrete,  drainable fluid collection or radiopaque foreign body. No gas bubbles.  No evidence for extension into the deep cervical soft tissues or  interspinous involvement. Thickening of the trapezius and ligamentum  nuchae structures noted.  Muscular edema versus myositis possible.  There is no evidence for intramuscular fluid collection or mass.    Visualized sinuses, nasopharynx and orbits: Unremarkable.     Tongue, oral cavity and oropharynx:  Unremarkable.     Hypopharynx: Unremarkable.     Larynx and trachea: Unremarkable.     Thyroid: No abnormal thyroid gland nodules.     Submandibular glands: Unremarkable.     Parotid glands: Unremarkable.       Lymph nodes: Scattered reactive nodes noted in the jugular chains and  posterior triangles. No evidence for cystic or necrotic adenopathy.     Vasculature: Unremarkable.     Upper mediastinum and lungs: Unremarkable.     Bones: Unremarkable.     Extensive mandibular and maxillary dental caries and periodontal  disease noted.      Impression    IMPRESSION:    Ill-defined, moderately extensive inflammatory process involving the  posterior upper cervical and occipitocervical soft tissues. No  evidence for discrete drainable abscess, bone involvement or deep  intraspinal extension. Mild muscular swelling and interspersed edema  noted. Probable extension of presumed cellulitis into the  spaces,  however myositis remains a possibility. No evidence for discrete,  intramuscular masses or fluid collections.      HAY JONES MD         SYSTEM ID:  [id number]   XR Foot Right G/E 3 Views    Narrative    XR FOOT RIGHT G/E 3 VIEWS 9/13/2021 4:42 PM     HISTORY: ulcer below the first metataral head      Impression    IMPRESSION: Plantar wound beneath the first metatarsal head as seen on  the tangential view. No apparent osseous destruction or joint space  narrowing. Fifth toe proximal phalangeal deformity, presumably related  to old healed fracture. Forefoot arterial calcification.    MICHAEL CRONIN MD         SYSTEM ID:  [id number]

## 2021-09-23 NOTE — PLAN OF CARE
Cognitive Concerns/ Orientation : A & Ox 4, calm and cooperative  BEHAVIOR & AGGRESSION TOOL COLOR: Green    CIWA SCORE: NA    ABNL VS/O2: VSS on RA   MOBILITY: independent, steady, ortho shoe to be worn when OOB  PAIN MANAGMENT: Denies pain  DIET: Mod CHO, tolerating   BOWEL/BLADDER: BS active x 4, continent, up to BR   ABNL LAB/BG:  Hgb12.3   DRAIN/DEVICES: no IV-MD aware   TELEMETRY RHYTHM: NA   SKIN: tunneled wound on R. Side of neck (packing dressing, small amount of yellow drainage/ refused dressing change, CDI), R. Foot ulcer (red, refusing dressing changes)   TESTS/PROCEDURES: NA   D/C DATE: pending   Discharge Barriers: TCU placement-SW following   OTHER IMPORTANT INFO: PO bactrim BID; contact precautions maintained (MRSA in neck wound), baseline numbness/tingling in feet

## 2021-09-23 NOTE — PLAN OF CARE
DATE & TIME: 9/22/2021; 7689-3000  Cognitive Concerns/ Orientation : A & Ox 4, calm and cooperative, resting comfortable   BEHAVIOR & AGGRESSION TOOL COLOR: Green    CIWA SCORE: NA    ABNL VS/O2: VSS on RA   MOBILITY: independent, steady, ortho shoe to be worn when Oob   PAIN MANAGMENT: reporting R. Neck pain, managed well with PRN tylenol and oxycodone  DIET: Mod CHO, tolerating   BOWEL/BLADDER: BS active x 4, continent, up to BR   ABNL LAB/BG: Qa=293,104; hgb=12.3   DRAIN/DEVICES: no IV-MD aware   TELEMETRY RHYTHM: NA   SKIN: tunneled wound on R. Side of neck (packing dressing, small amount of yellow drainage/ refused dressing change, CDI), R. Foot ulcer (red, refusing dressing changes)   TESTS/PROCEDURES: NA   D/C DATE: pending   Discharge Barriers: TCU placement-SW following   OTHER IMPORTANT INFO: PO bactrim BID; contact precautions maintained (MRSA in neck wound), refusing routine covid swab per day shift; baseline numbness/tingling in feet

## 2021-09-24 ENCOUNTER — PATIENT OUTREACH (OUTPATIENT)
Dept: CARE COORDINATION | Facility: CLINIC | Age: 51
End: 2021-09-24

## 2021-09-24 DIAGNOSIS — Z71.89 OTHER SPECIFIED COUNSELING: ICD-10-CM

## 2021-09-24 NOTE — PROGRESS NOTES
Clinic Care Coordination Contact  [facility]/Voicemail       Clinical Data: Care Coordinator Outreach  Outreach attempted x 1. Phone just kept ringing for over a minute.  Plan:  Care Coordinator will try to reach patient again in 1-2 business days.    Jo Escalera  Community Health Worker  Connected Care Resource [facility]  [phone]

## 2021-09-25 NOTE — PROGRESS NOTES
Clinic Care Coordination Contact  [facility]/Voicemail       Clinical Data: Care Coordinator Outreach  Outreach attempted x 2.  Unable to leave message on patient's voicemail with call back information.    Plan:  Care Coordinator will do no further outreaches at this time.    Terri HEART Community Health Worker  Clinic Care Coordination  [facility]  Phone: [phone]

## 2021-12-19 ENCOUNTER — HOSPITAL ENCOUNTER (INPATIENT)
Facility: CLINIC | Age: 51
LOS: 12 days | Discharge: HOME | End: 2022-01-02
Attending: EMERGENCY MEDICINE | Admitting: INTERNAL MEDICINE
Payer: COMMERCIAL

## 2021-12-19 DIAGNOSIS — E11.9 TYPE 2 DIABETES MELLITUS WITHOUT COMPLICATION, WITH LONG-TERM CURRENT USE OF INSULIN (H): Primary | ICD-10-CM

## 2021-12-19 DIAGNOSIS — I10 BENIGN ESSENTIAL HYPERTENSION: ICD-10-CM

## 2021-12-19 DIAGNOSIS — L03.115 CELLULITIS OF RIGHT LOWER EXTREMITY: ICD-10-CM

## 2021-12-19 DIAGNOSIS — A41.9 ACUTE SEPSIS (H): ICD-10-CM

## 2021-12-19 DIAGNOSIS — Z79.4 TYPE 2 DIABETES MELLITUS WITHOUT COMPLICATION, WITH LONG-TERM CURRENT USE OF INSULIN (H): Primary | ICD-10-CM

## 2021-12-19 PROCEDURE — 93005 ELECTROCARDIOGRAM TRACING: CPT

## 2021-12-19 PROCEDURE — 96361 HYDRATE IV INFUSION ADD-ON: CPT

## 2021-12-19 PROCEDURE — 99285 EMERGENCY DEPT VISIT HI MDM: CPT | Mod: 25

## 2021-12-19 PROCEDURE — C9803 HOPD COVID-19 SPEC COLLECT: HCPCS

## 2021-12-19 PROCEDURE — 96360 HYDRATION IV INFUSION INIT: CPT

## 2021-12-19 ASSESSMENT — MIFFLIN-ST. JEOR: SCORE: 2019.26

## 2021-12-20 ENCOUNTER — APPOINTMENT (OUTPATIENT)
Dept: MRI IMAGING | Facility: CLINIC | Age: 51
End: 2021-12-20
Attending: PODIATRIST
Payer: COMMERCIAL

## 2021-12-20 ENCOUNTER — APPOINTMENT (OUTPATIENT)
Dept: ULTRASOUND IMAGING | Facility: CLINIC | Age: 51
End: 2021-12-20
Attending: EMERGENCY MEDICINE
Payer: COMMERCIAL

## 2021-12-20 ENCOUNTER — APPOINTMENT (OUTPATIENT)
Dept: GENERAL RADIOLOGY | Facility: CLINIC | Age: 51
End: 2021-12-20
Attending: HOSPITALIST
Payer: COMMERCIAL

## 2021-12-20 ENCOUNTER — APPOINTMENT (OUTPATIENT)
Dept: CT IMAGING | Facility: CLINIC | Age: 51
End: 2021-12-20
Attending: HOSPITALIST
Payer: COMMERCIAL

## 2021-12-20 PROBLEM — L08.9 SKIN INFECTION: Status: ACTIVE | Noted: 2021-12-20

## 2021-12-20 PROBLEM — L03.115 CELLULITIS OF RIGHT LOWER EXTREMITY: Status: ACTIVE | Noted: 2021-12-20

## 2021-12-20 LAB
ALBUMIN SERPL-MCNC: 2.5 G/DL (ref 3.4–5)
ALP SERPL-CCNC: 97 U/L (ref 40–150)
ALT SERPL W P-5'-P-CCNC: 19 U/L (ref 0–70)
ANION GAP SERPL CALCULATED.3IONS-SCNC: 5 MMOL/L (ref 3–14)
AST SERPL W P-5'-P-CCNC: 22 U/L (ref 0–45)
BASOPHILS # BLD AUTO: 0 10E3/UL (ref 0–0.2)
BASOPHILS NFR BLD AUTO: 0 %
BILIRUB SERPL-MCNC: 0.5 MG/DL (ref 0.2–1.3)
BUN SERPL-MCNC: 25 MG/DL (ref 7–30)
CALCIUM SERPL-MCNC: 8.8 MG/DL (ref 8.5–10.1)
CHLORIDE BLD-SCNC: 90 MMOL/L (ref 94–109)
CO2 SERPL-SCNC: 32 MMOL/L (ref 20–32)
CREAT SERPL-MCNC: 1.32 MG/DL (ref 0.66–1.25)
CRP SERPL-MCNC: 216 MG/L (ref 0–8)
D DIMER PPP FEU-MCNC: 2.24 UG/ML FEU (ref 0–0.5)
EOSINOPHIL # BLD AUTO: 0 10E3/UL (ref 0–0.7)
EOSINOPHIL NFR BLD AUTO: 0 %
ERYTHROCYTE [DISTWIDTH] IN BLOOD BY AUTOMATED COUNT: 12.3 % (ref 10–15)
ETHANOL SERPL-MCNC: <0.01 G/DL
GFR SERPL CREATININE-BSD FRML MDRD: 61 ML/MIN/1.73M2
GLUCOSE BLD-MCNC: 266 MG/DL (ref 70–99)
GLUCOSE BLDC GLUCOMTR-MCNC: 133 MG/DL (ref 70–99)
GLUCOSE BLDC GLUCOMTR-MCNC: 151 MG/DL (ref 70–99)
GLUCOSE BLDC GLUCOMTR-MCNC: 234 MG/DL (ref 70–99)
GLUCOSE BLDC GLUCOMTR-MCNC: 239 MG/DL (ref 70–99)
HBA1C MFR BLD: 9.7 % (ref 0–5.6)
HCT VFR BLD AUTO: 40.9 % (ref 40–53)
HGB BLD-MCNC: 13.8 G/DL (ref 13.3–17.7)
HOLD SPECIMEN: NORMAL
HOLD SPECIMEN: NORMAL
IMM GRANULOCYTES # BLD: 0.1 10E3/UL
IMM GRANULOCYTES NFR BLD: 1 %
KETONES BLD-SCNC: 0.5 MMOL/L (ref 0–0.6)
LACTATE SERPL-SCNC: 1 MMOL/L (ref 0.7–2)
LYMPHOCYTES # BLD AUTO: 1.5 10E3/UL (ref 0.8–5.3)
LYMPHOCYTES NFR BLD AUTO: 24 %
MCH RBC QN AUTO: 27.9 PG (ref 26.5–33)
MCHC RBC AUTO-ENTMCNC: 33.7 G/DL (ref 31.5–36.5)
MCV RBC AUTO: 83 FL (ref 78–100)
MONOCYTES # BLD AUTO: 0.6 10E3/UL (ref 0–1.3)
MONOCYTES NFR BLD AUTO: 9 %
NEUTROPHILS # BLD AUTO: 4.3 10E3/UL (ref 1.6–8.3)
NEUTROPHILS NFR BLD AUTO: 66 %
NRBC # BLD AUTO: 0 10E3/UL
NRBC BLD AUTO-RTO: 0 /100
PLATELET # BLD AUTO: 258 10E3/UL (ref 150–450)
POTASSIUM BLD-SCNC: 4.2 MMOL/L (ref 3.4–5.3)
PROCALCITONIN SERPL-MCNC: 0.19 NG/ML
PROT SERPL-MCNC: 8.5 G/DL (ref 6.8–8.8)
RBC # BLD AUTO: 4.95 10E6/UL (ref 4.4–5.9)
SARS-COV-2 RNA RESP QL NAA+PROBE: POSITIVE
SODIUM SERPL-SCNC: 127 MMOL/L (ref 133–144)
WBC # BLD AUTO: 6.4 10E3/UL (ref 4–11)

## 2021-12-20 PROCEDURE — 96376 TX/PRO/DX INJ SAME DRUG ADON: CPT

## 2021-12-20 PROCEDURE — 87040 BLOOD CULTURE FOR BACTERIA: CPT | Performed by: INTERNAL MEDICINE

## 2021-12-20 PROCEDURE — 80053 COMPREHEN METABOLIC PANEL: CPT | Performed by: EMERGENCY MEDICINE

## 2021-12-20 PROCEDURE — 255N000002 HC RX 255 OP 636: Performed by: INTERNAL MEDICINE

## 2021-12-20 PROCEDURE — 82962 GLUCOSE BLOOD TEST: CPT

## 2021-12-20 PROCEDURE — 250N000011 HC RX IP 250 OP 636: Performed by: INTERNAL MEDICINE

## 2021-12-20 PROCEDURE — 82077 ASSAY SPEC XCP UR&BREATH IA: CPT | Performed by: EMERGENCY MEDICINE

## 2021-12-20 PROCEDURE — 258N000003 HC RX IP 258 OP 636: Performed by: EMERGENCY MEDICINE

## 2021-12-20 PROCEDURE — 250N000013 HC RX MED GY IP 250 OP 250 PS 637: Performed by: EMERGENCY MEDICINE

## 2021-12-20 PROCEDURE — 0JBQ0ZZ EXCISION OF RIGHT FOOT SUBCUTANEOUS TISSUE AND FASCIA, OPEN APPROACH: ICD-10-PCS | Performed by: PODIATRIST

## 2021-12-20 PROCEDURE — 99220 PR INITIAL OBSERVATION CARE,LEVEL III: CPT | Performed by: INTERNAL MEDICINE

## 2021-12-20 PROCEDURE — 250N000011 HC RX IP 250 OP 636: Performed by: HOSPITALIST

## 2021-12-20 PROCEDURE — 83605 ASSAY OF LACTIC ACID: CPT | Performed by: EMERGENCY MEDICINE

## 2021-12-20 PROCEDURE — 85025 COMPLETE CBC W/AUTO DIFF WBC: CPT | Performed by: EMERGENCY MEDICINE

## 2021-12-20 PROCEDURE — 85379 FIBRIN DEGRADATION QUANT: CPT | Performed by: HOSPITALIST

## 2021-12-20 PROCEDURE — 258N000003 HC RX IP 258 OP 636: Performed by: INTERNAL MEDICINE

## 2021-12-20 PROCEDURE — 99221 1ST HOSP IP/OBS SF/LOW 40: CPT | Performed by: SURGERY

## 2021-12-20 PROCEDURE — 96372 THER/PROPH/DIAG INJ SC/IM: CPT | Performed by: INTERNAL MEDICINE

## 2021-12-20 PROCEDURE — 250N000009 HC RX 250: Performed by: INTERNAL MEDICINE

## 2021-12-20 PROCEDURE — 70491 CT SOFT TISSUE NECK W/DYE: CPT

## 2021-12-20 PROCEDURE — 86140 C-REACTIVE PROTEIN: CPT | Performed by: HOSPITALIST

## 2021-12-20 PROCEDURE — 82010 KETONE BODYS QUAN: CPT | Performed by: EMERGENCY MEDICINE

## 2021-12-20 PROCEDURE — 73720 MRI LWR EXTREMITY W/O&W/DYE: CPT | Mod: 26 | Performed by: RADIOLOGY

## 2021-12-20 PROCEDURE — 99253 IP/OBS CNSLTJ NEW/EST LOW 45: CPT | Mod: 25 | Performed by: PODIATRIST

## 2021-12-20 PROCEDURE — G0378 HOSPITAL OBSERVATION PER HR: HCPCS

## 2021-12-20 PROCEDURE — 96365 THER/PROPH/DIAG IV INF INIT: CPT

## 2021-12-20 PROCEDURE — 84145 PROCALCITONIN (PCT): CPT | Performed by: HOSPITALIST

## 2021-12-20 PROCEDURE — 96366 THER/PROPH/DIAG IV INF ADDON: CPT

## 2021-12-20 PROCEDURE — 250N000013 HC RX MED GY IP 250 OP 250 PS 637: Performed by: HOSPITALIST

## 2021-12-20 PROCEDURE — 73720 MRI LWR EXTREMITY W/O&W/DYE: CPT | Mod: RT

## 2021-12-20 PROCEDURE — 71045 X-RAY EXAM CHEST 1 VIEW: CPT

## 2021-12-20 PROCEDURE — 36415 COLL VENOUS BLD VENIPUNCTURE: CPT | Performed by: INTERNAL MEDICINE

## 2021-12-20 PROCEDURE — 96372 THER/PROPH/DIAG INJ SC/IM: CPT | Performed by: HOSPITALIST

## 2021-12-20 PROCEDURE — 83036 HEMOGLOBIN GLYCOSYLATED A1C: CPT | Performed by: INTERNAL MEDICINE

## 2021-12-20 PROCEDURE — 36415 COLL VENOUS BLD VENIPUNCTURE: CPT | Performed by: EMERGENCY MEDICINE

## 2021-12-20 PROCEDURE — 250N000013 HC RX MED GY IP 250 OP 250 PS 637: Performed by: INTERNAL MEDICINE

## 2021-12-20 PROCEDURE — 87635 SARS-COV-2 COVID-19 AMP PRB: CPT | Performed by: EMERGENCY MEDICINE

## 2021-12-20 PROCEDURE — 93926 LOWER EXTREMITY STUDY: CPT | Mod: LT

## 2021-12-20 PROCEDURE — 250N000012 HC RX MED GY IP 250 OP 636 PS 637: Performed by: INTERNAL MEDICINE

## 2021-12-20 PROCEDURE — 99207 PR APP CREDIT; MD BILLING SHARED VISIT: CPT | Performed by: HOSPITALIST

## 2021-12-20 PROCEDURE — 36415 COLL VENOUS BLD VENIPUNCTURE: CPT | Performed by: HOSPITALIST

## 2021-12-20 PROCEDURE — A9585 GADOBUTROL INJECTION: HCPCS | Performed by: INTERNAL MEDICINE

## 2021-12-20 PROCEDURE — 11042 DBRDMT SUBQ TIS 1ST 20SQCM/<: CPT | Performed by: PODIATRIST

## 2021-12-20 RX ORDER — GADOBUTROL 604.72 MG/ML
11 INJECTION INTRAVENOUS ONCE
Status: COMPLETED | OUTPATIENT
Start: 2021-12-20 | End: 2021-12-20

## 2021-12-20 RX ORDER — NICOTINE POLACRILEX 4 MG
15-30 LOZENGE BUCCAL
Status: DISCONTINUED | OUTPATIENT
Start: 2021-12-20 | End: 2022-01-02 | Stop reason: HOSPADM

## 2021-12-20 RX ORDER — CEPHALEXIN 500 MG/1
500 CAPSULE ORAL ONCE
Status: COMPLETED | OUTPATIENT
Start: 2021-12-20 | End: 2021-12-20

## 2021-12-20 RX ORDER — SODIUM CHLORIDE, SODIUM LACTATE, POTASSIUM CHLORIDE, CALCIUM CHLORIDE 600; 310; 30; 20 MG/100ML; MG/100ML; MG/100ML; MG/100ML
125 INJECTION, SOLUTION INTRAVENOUS CONTINUOUS
Status: DISCONTINUED | OUTPATIENT
Start: 2021-12-20 | End: 2021-12-20

## 2021-12-20 RX ORDER — ONDANSETRON 2 MG/ML
4 INJECTION INTRAMUSCULAR; INTRAVENOUS EVERY 6 HOURS PRN
Status: DISCONTINUED | OUTPATIENT
Start: 2021-12-20 | End: 2021-12-22

## 2021-12-20 RX ORDER — AMPICILLIN AND SULBACTAM 2; 1 G/1; G/1
3 INJECTION, POWDER, FOR SOLUTION INTRAMUSCULAR; INTRAVENOUS EVERY 6 HOURS
Status: DISCONTINUED | OUTPATIENT
Start: 2021-12-20 | End: 2021-12-31

## 2021-12-20 RX ORDER — IOPAMIDOL 755 MG/ML
80 INJECTION, SOLUTION INTRAVASCULAR ONCE
Status: COMPLETED | OUTPATIENT
Start: 2021-12-20 | End: 2021-12-20

## 2021-12-20 RX ORDER — NALOXONE HYDROCHLORIDE 0.4 MG/ML
0.2 INJECTION, SOLUTION INTRAMUSCULAR; INTRAVENOUS; SUBCUTANEOUS
Status: DISCONTINUED | OUTPATIENT
Start: 2021-12-20 | End: 2022-01-02 | Stop reason: HOSPADM

## 2021-12-20 RX ORDER — DEXTROSE MONOHYDRATE 25 G/50ML
25-50 INJECTION, SOLUTION INTRAVENOUS
Status: DISCONTINUED | OUTPATIENT
Start: 2021-12-20 | End: 2022-01-02 | Stop reason: HOSPADM

## 2021-12-20 RX ORDER — NALOXONE HYDROCHLORIDE 0.4 MG/ML
0.4 INJECTION, SOLUTION INTRAMUSCULAR; INTRAVENOUS; SUBCUTANEOUS
Status: DISCONTINUED | OUTPATIENT
Start: 2021-12-20 | End: 2022-01-02 | Stop reason: HOSPADM

## 2021-12-20 RX ORDER — PROCHLORPERAZINE 25 MG
25 SUPPOSITORY, RECTAL RECTAL EVERY 12 HOURS PRN
Status: DISCONTINUED | OUTPATIENT
Start: 2021-12-20 | End: 2021-12-22

## 2021-12-20 RX ORDER — IBUPROFEN 400 MG/1
400 TABLET, FILM COATED ORAL EVERY 6 HOURS PRN
Status: DISCONTINUED | OUTPATIENT
Start: 2021-12-20 | End: 2022-01-02 | Stop reason: HOSPADM

## 2021-12-20 RX ORDER — ACETAMINOPHEN 650 MG/1
650 SUPPOSITORY RECTAL EVERY 6 HOURS PRN
Status: DISCONTINUED | OUTPATIENT
Start: 2021-12-20 | End: 2022-01-02 | Stop reason: HOSPADM

## 2021-12-20 RX ORDER — OXYCODONE HYDROCHLORIDE 5 MG/1
5 TABLET ORAL EVERY 6 HOURS PRN
Status: DISCONTINUED | OUTPATIENT
Start: 2021-12-20 | End: 2022-01-02 | Stop reason: HOSPADM

## 2021-12-20 RX ORDER — ACETAMINOPHEN 325 MG/1
650 TABLET ORAL EVERY 6 HOURS PRN
Status: DISCONTINUED | OUTPATIENT
Start: 2021-12-20 | End: 2021-12-21

## 2021-12-20 RX ORDER — SULFAMETHOXAZOLE/TRIMETHOPRIM 800-160 MG
1 TABLET ORAL 2 TIMES DAILY
Status: DISCONTINUED | OUTPATIENT
Start: 2021-12-20 | End: 2021-12-20

## 2021-12-20 RX ORDER — PROCHLORPERAZINE MALEATE 10 MG
10 TABLET ORAL EVERY 6 HOURS PRN
Status: DISCONTINUED | OUTPATIENT
Start: 2021-12-20 | End: 2021-12-22

## 2021-12-20 RX ORDER — ONDANSETRON 4 MG/1
4 TABLET, ORALLY DISINTEGRATING ORAL EVERY 6 HOURS PRN
Status: DISCONTINUED | OUTPATIENT
Start: 2021-12-20 | End: 2021-12-22

## 2021-12-20 RX ORDER — SULFAMETHOXAZOLE/TRIMETHOPRIM 800-160 MG
1 TABLET ORAL ONCE
Status: COMPLETED | OUTPATIENT
Start: 2021-12-20 | End: 2021-12-20

## 2021-12-20 RX ORDER — LIDOCAINE 40 MG/G
CREAM TOPICAL
Status: DISCONTINUED | OUTPATIENT
Start: 2021-12-20 | End: 2021-12-23

## 2021-12-20 RX ADMIN — CEPHALEXIN 250 MG: 250 CAPSULE ORAL at 13:07

## 2021-12-20 RX ADMIN — INSULIN ASPART 4 UNITS: 100 INJECTION, SOLUTION INTRAVENOUS; SUBCUTANEOUS at 10:14

## 2021-12-20 RX ADMIN — DICLOFENAC SODIUM 4 G: 10 GEL TOPICAL at 13:15

## 2021-12-20 RX ADMIN — IOPAMIDOL 80 ML: 755 INJECTION, SOLUTION INTRAVENOUS at 10:58

## 2021-12-20 RX ADMIN — DICLOFENAC SODIUM 4 G: 10 GEL TOPICAL at 21:29

## 2021-12-20 RX ADMIN — INSULIN GLARGINE 20 UNITS: 100 INJECTION, SOLUTION SUBCUTANEOUS at 10:15

## 2021-12-20 RX ADMIN — AMPICILLIN SODIUM AND SULBACTAM SODIUM 3 G: 2; 1 INJECTION, POWDER, FOR SOLUTION INTRAMUSCULAR; INTRAVENOUS at 21:18

## 2021-12-20 RX ADMIN — SODIUM CHLORIDE 60 ML: 9 INJECTION, SOLUTION INTRAVENOUS at 10:58

## 2021-12-20 RX ADMIN — DICLOFENAC SODIUM 4 G: 10 GEL TOPICAL at 10:16

## 2021-12-20 RX ADMIN — GADOBUTROL 11 ML: 604.72 INJECTION INTRAVENOUS at 18:05

## 2021-12-20 RX ADMIN — ACETAMINOPHEN 650 MG: 325 TABLET, FILM COATED ORAL at 09:36

## 2021-12-20 RX ADMIN — GUAIFENESIN 10 ML: 200 SOLUTION ORAL at 10:14

## 2021-12-20 RX ADMIN — SULFAMETHOXAZOLE AND TRIMETHOPRIM 1 TABLET: 800; 160 TABLET ORAL at 03:41

## 2021-12-20 RX ADMIN — ENOXAPARIN SODIUM 40 MG: 40 INJECTION SUBCUTANEOUS at 13:08

## 2021-12-20 RX ADMIN — IBUPROFEN 400 MG: 400 TABLET ORAL at 13:07

## 2021-12-20 RX ADMIN — SULFAMETHOXAZOLE AND TRIMETHOPRIM 1 TABLET: 800; 160 TABLET ORAL at 10:14

## 2021-12-20 RX ADMIN — INSULIN ASPART 4 UNITS: 100 INJECTION, SOLUTION INTRAVENOUS; SUBCUTANEOUS at 13:08

## 2021-12-20 RX ADMIN — SODIUM CHLORIDE, POTASSIUM CHLORIDE, SODIUM LACTATE AND CALCIUM CHLORIDE 1000 ML: 600; 310; 30; 20 INJECTION, SOLUTION INTRAVENOUS at 00:21

## 2021-12-20 RX ADMIN — AMPICILLIN SODIUM AND SULBACTAM SODIUM 3 G: 2; 1 INJECTION, POWDER, FOR SOLUTION INTRAMUSCULAR; INTRAVENOUS at 13:46

## 2021-12-20 RX ADMIN — OXYCODONE HYDROCHLORIDE 5 MG: 5 TABLET ORAL at 13:07

## 2021-12-20 RX ADMIN — VANCOMYCIN HYDROCHLORIDE 2000 MG: 5 INJECTION, POWDER, LYOPHILIZED, FOR SOLUTION INTRAVENOUS at 14:24

## 2021-12-20 RX ADMIN — CEPHALEXIN 500 MG: 500 CAPSULE ORAL at 03:41

## 2021-12-20 ASSESSMENT — ACTIVITIES OF DAILY LIVING (ADL): DEPENDENT_IADLS:: INDEPENDENT

## 2021-12-20 NOTE — PROGRESS NOTES
"PRIMARY DIAGNOSIS: \"GENERIC\" NURSING  OUTPATIENT/OBSERVATION GOALS TO BE MET BEFORE DISCHARGE:  1. ADLs back to baseline: No    2. Activity and level of assistance: Up with standby assistance.    3. Pain status: Improved-controlled with oral pain medications.    4. Return to near baseline physical activity: No     Discharge Planner Nurse   Safe discharge environment identified: No  Barriers to discharge: Yes - safe discharge plan, SW consult placed. ID and gen surg consults. Podiatry rec MRI R foot.        Entered by: Alberta Jerez 12/20/2021 1:24 PM     Please review provider order for any additional goals.   Nurse to notify provider when observation goals have been met and patient is ready for discharge.  "

## 2021-12-20 NOTE — PROGRESS NOTES
Hospitalist update: please refer to the H&P done earlier this am.  Repots productive cough and mild sob, sating well on RA.  Also reports neck pain (recently here for neck abscess and did not follow unit(s)-homeless).  Hyperglycemic, did not get his lantus this am yet. Reports he has not been taking anything (supposed to) PTA.  Prn cough meds.  CXR.  Porcalcitonin.  D dimer/crp.  CT neck.  Cont current insulin regimen. Add premal carb coverage if needed.  Pain control.      Disp: TBD. Homeless. Consult  for safe discharge planning.     Addendum:  CT neck result noted.  ID/surgery consult placed.

## 2021-12-20 NOTE — PHARMACY-ADMISSION MEDICATION HISTORY
Pharmacy Medication History  Admission medication history interview status for the 12/19/2021  admission is complete. See EPIC admission navigator for prior to admission medications     Location of Interview: Phone  Medication history sources: Patient    Significant changes made to the medication list:  None     In the past week, patient estimated taking medication this percent of the time: No medications taken in over a month per pt report    Additional medication history information:   Pt has not taken any of his medications in over a month.     Medication reconciliation completed by provider prior to medication history? Yes    Time spent in this activity: 20 min    Prior to Admission medications    Medication Sig Last Dose Taking? Auth Provider   acetaminophen (TYLENOL) 500 MG tablet Take 1 tablet (500 mg) by mouth every 8 hours as needed for other (mild to moderate pain) More than a month at Unknown time Yes Cecy Osuna MD   insulin aspart (NOVOLOG FLEXPEN) 100 UNIT/ML pen 1-20 units on sliding scale ,120-150-1 unit,[id number],201-250 4Units,[id number],100-928-04sense,351-400 -12 units ,call md if>400 More than a month at Unknown time Yes Cecy Osuna MD   insulin aspart (NOVOLOG PEN) 100 UNIT/ML pen Inject 8 Units Subcutaneous 3 times daily (with meals) More than a month at Unknown time Yes Cecy Osuna MD   insulin glargine (LANTUS PEN) 100 UNIT/ML pen Inject 30 Units Subcutaneous every evening More than a month at Unknown time Yes Cecy Osuna MD   lisinopril (ZESTRIL) 10 MG tablet Take 1 tablet (10 mg) by mouth daily More than a month at Unknown time Yes Cecy Osuna MD   metFORMIN (GLUCOPHAGE) 500 MG tablet Take 1,000 mg by mouth 2 times daily More than a month at Unknown time Yes Unknown, Entered By History   oxyCODONE (ROXICODONE) 5 MG tablet Take 1 tablet (5 mg) by mouth 3 times daily as needed for moderate to severe pain (use as second choice to tylenol if pain not relieved  with tyelnol.) More than a month at Unknown time Yes Cecy Osuna MD   senna-docusate (SENOKOT-S/PERICOLACE) 8.6-50 MG tablet Take 1 tablet by mouth 2 times daily as needed for constipation More than a month at Unknown time Yes Cecy Osuna MD   sulfamethoxazole-trimethoprim (BACTRIM DS) 800-160 MG tablet Take 1 tablet by mouth 2 times daily More than a month at Unknown time Yes Cecy Osuna MD       The information provided in this note is only as accurate as the sources available at the time of update(s)

## 2021-12-20 NOTE — CONSULTS
General Surgery [facility] Consultation/H&P    Chris Shea MRN#: [id number]   Age: 51 year old YOB: 1970     Date of Admission:          12/19/2021  Reason for consult/H&P: Neck abscess in the past   Surgeon:      Sohail Thompson MD                  Chief Complaint:   Multiple problems         History of Present Illness:   This patient is a 51 year old  male who presented to the [facility] ER with leg sores, diabetes, homelessness. Denies fever, chills, nausea, vomiting, change in BM or urination.   Denies having any previous episodes or abdominal surgery. History is obtained from the patient and chart. He had a neck abscess drained this fall. This area has not been bothering him recently.         Past Medical History:    has a past medical history of Methamphetamine abuse (H), Right foot ulcer (H), and Type 2 diabetes mellitus with diabetic peripheral angiopathy without gangrene, without long-term current use of insulin (H).          Past Surgical History:     Past Surgical History:   Procedure Laterality Date     INCISION AND DRAINAGE NECK, COMBINED N/A 9/14/2021    Procedure: INCISION AND DRAINAGE, POSTERIOR NECK;  Surgeon: Lucina Dodson MD;  Location:  OR            Medications:     Prior to Admission medications    Medication Sig Start Date End Date Taking? Authorizing Provider   acetaminophen (TYLENOL) 500 MG tablet Take 1 tablet (500 mg) by mouth every 8 hours as needed for other (mild to moderate pain) 9/23/21  Yes Cecy Osuna MD   insulin aspart (NOVOLOG FLEXPEN) 100 UNIT/ML pen 1-20 units on sliding scale ,120-150-1 unit,[id number],201-250 4Units,026-526-6mcpuo,[id number],351-400 -12 units ,call md if>400 9/23/21  Yes eCcy Osuna MD   insulin aspart (NOVOLOG PEN) 100 UNIT/ML pen Inject 8 Units Subcutaneous 3 times daily (with meals) 9/23/21  Yes Cecy Osuna MD   insulin glargine (LANTUS PEN) 100 UNIT/ML pen Inject 30 Units Subcutaneous every  evening 9/23/21  Yes Cecy Osuna MD   lisinopril (ZESTRIL) 10 MG tablet Take 1 tablet (10 mg) by mouth daily 9/24/21  Yes Cecy Osuna MD   metFORMIN (GLUCOPHAGE) 500 MG tablet Take 1,000 mg by mouth 2 times daily   Yes Unknown, Entered By History   oxyCODONE (ROXICODONE) 5 MG tablet Take 1 tablet (5 mg) by mouth 3 times daily as needed for moderate to severe pain (use as second choice to tylenol if pain not relieved with tyelnol.) 9/23/21  Yes Cecy Osuna MD   senna-docusate (SENOKOT-S/PERICOLACE) 8.6-50 MG tablet Take 1 tablet by mouth 2 times daily as needed for constipation 9/23/21  Yes Cecy Osuna MD   sulfamethoxazole-trimethoprim (BACTRIM DS) 800-160 MG tablet Take 1 tablet by mouth 2 times daily 9/23/21  Yes Cecy Osuna MD   Alcohol Swabs PADS Use to swab the area of the injection or abilio as directed. Per insurance coverage 9/16/21   Luisa Barfield MD   blood glucose (NO BRAND SPECIFIED) lancets standard To use to test glucose level in the blood. Use to test blood sugar  3  times daily as directed. To accompany glucose monitor brands per insurance coverage. 9/16/21   Luisa Barfield MD   blood glucose (NO BRAND SPECIFIED) test strip To use to test glucose level in the blood. Use to test blood sugar  3 times daily as directed. To accompany glucose monitor brands per insurance coverage. 9/16/21   Luisa Barfield MD   blood glucose calibration (NO BRAND SPECIFIED) solution Used to calibrate the blood glucose monitor as needed and as directed.  To accompany  blood glucose brands per insurance coverage 9/16/21   Luisa Barfield MD   blood glucose monitoring (NO BRAND SPECIFIED) meter device kit Use as directed. Per insurance coverage 9/16/21   Luisa Barfield MD   insulin pen needle (31G X 5 MM) 31G X 5 MM miscellaneous Use as directed by provider. Per insurance coverage 9/16/21   Luisa Barfield MD   Sharps Container MISC Use as directed to dispose of needles, lancets and  "other sharps. Per Insurance coverage 9/16/21   Luisa Barfield MD            Allergies:   No Known Allergies         Social History:     Social History     Tobacco Use     Smoking status: Never Smoker     Smokeless tobacco: Not on file   Substance Use Topics     Alcohol use: No             Family History:    This patient has no significant relevant family history.  Family history is reviewed in detail.          Review of Systems:   Complete ROS is negative other than noted in the HPI.  C: NEGATIVE for , change in weight  R: NEGATIVE for significant  SOB  CV: NEGATIVE for chest pain, palpitations or peripheral edema  GI:  NEGATIVE for dysuria, heartburn, or change in bowel habits  H: NEGATIVE for bleeding problems         Physical Exam:   Blood pressure 122/76, pulse 90, temperature 98.5  F (36.9  C), temperature source Oral, resp. rate 17, height 1.905 m (6' 3\"), weight 108.9 kg (240 lb), SpO2 94 %.  I/O last 3 completed shifts:  In: 300 [P.O.:300]  Out: 600 [Urine:600]    General - This is a well developed, well nourished male .  HEENT - Normocephalic. Atraumatic. Moist mucous membranes. Pupils equal.  No scleral icterus. Nose normal.  Neck - Supple without masses. He has a non tender, firm area on the back of his right neck. No drainage, tenderness or opening.No cervical adenopathy or thyromegaly  Lungs - Breathing not labored  Chest - Not tender. CVA's nontender. No supraclavicular adenopathy            Data:   Labs:  Recent Labs   Lab Test 12/20/21  0017 09/22/21  0808 09/16/21  0927   WBC 6.4 9.9 9.6   HGB 13.8 12.3* 12.2*   HCT 40.9 37.9* 38.0*    515* 304     Recent Labs   Lab Test 12/20/21  0017 09/22/21  0808 09/21/21  0956 09/20/21  0734 09/17/21  0817 09/16/21  0927 09/15/21  0911   POTASSIUM 4.2 4.9  --  3.7   < > 3.7 4.0   CHLORIDE 90*  --   --   --   --  102 102   CO2 32  --   --   --   --  31 30   BUN 25  --   --   --   --  10 10   CR 1.32*  --  1.19 1.03   < > 1.14 1.11    < > = values in " this interval not displayed.     Recent Labs   Lab Test 12/20/21  0017 09/12/21  1820   BILITOTAL 0.5 0.6   ALT 19 13   AST 22 7   ALKPHOS 97 178*     No lab results found.  Recent Labs   Lab Test 12/20/21  0017 09/22/21  0808 09/20/21  0734 09/17/21  0817 09/16/21  0927 09/15/21  0911   ELINOR 8.8  --   --   --  9.1 8.2*   MAG  --  1.8 1.5* 1.6 1.7  --      Recent Labs   Lab Test 12/20/21  0017 09/16/21  0927 09/15/21  0911 09/13/21  0917 09/12/21  1820   ANIONGAP 5 6 2*   < > 10   ALBUMIN 2.5*  --   --   --  2.6*    < > = values in this interval not displayed.       CT scan of the neck: images reviewed in detail. No pus pocket seen. Thickened tissue, but not an abscess                   Assessment:   Neck abscess in the past. Not currently a problem. No need for treatment for this          Plan:      I would not treat the neck firmness. His foot ulcer has been debrided by podiatry.   Following     I have discussed the history, physical, and plan with the patient.   Sohail Thompson M.D.

## 2021-12-20 NOTE — CONSULTS
[facility] [facility]  CONSULTATION NOTE    CHIEF COMPLAINT: Right foot pain and ulcer     I was asked by Nichole Sood to evaluate this patient for right foot.    PATIENT HISTORY:  Chris Shea is a 51 year old male  with a past medical history significant for what's listed below. He presented to the ED because he didn't have a place to sleep, other than his car. He was found to be covid positive and to have a right foot ulcer. He states that his foot forms a blister that drains from time to time. States it's painful to touch. States that it started at work when he was on his feet a lot. He states he hasn't been seeing anyone to care for it. Tmax of 99 in the past 24 hours.         Review of Systems:  A 10 point review of systems was performed and is positive for that noted above in the patient history.  All other areas are negative.     PAST MEDICAL HISTORY:   Past Medical History:   Diagnosis Date     Methamphetamine abuse (H)      Right foot ulcer (H)      Type 2 diabetes mellitus with diabetic peripheral angiopathy without gangrene, without long-term current use of insulin (H)         PAST SURGICAL HISTORY:   Past Surgical History:   Procedure Laterality Date     INCISION AND DRAINAGE NECK, COMBINED N/A 9/14/2021    Procedure: INCISION AND DRAINAGE, POSTERIOR NECK;  Surgeon: Lucina Dodson MD;  Location:  OR        MEDICATIONS:  Reviewed in Epic. Current.     ALLERGIES:  No Known Allergies     SOCIAL HISTORY:   Social History     Socioeconomic History     Marital status: Single     Spouse name: Not on file     Number of children: Not on file     Years of education: Not on file     Highest education level: Not on file   Occupational History     Not on file   Tobacco Use     Smoking status: Never Smoker     Smokeless tobacco: Not on file   Substance and Sexual Activity     Alcohol use: No     Drug use: No     Sexual activity: Never   Other Topics Concern     Parent/sibling w/ CABG, MI or  "angioplasty before 65F 55M? Not Asked   Social History Narrative     Not on file     Social Determinants of Health     Financial Resource Strain: Not on file   Food Insecurity: Not on file   Transportation Needs: Not on file   Physical Activity: Not on file   Stress: Not on file   Social Connections: Not on file   Intimate Partner Violence: Not on file   Housing Stability: Not on file        FAMILY HISTORY: No family history on file.     EXAM:Vitals: /76   Pulse 97   Temp 99  F (37.2  C) (Oral)   Resp 18   Ht 1.905 m (6' 3\")   Wt 108.9 kg (240 lb)   SpO2 94%   BMI 30.00 kg/m    BMI= Body mass index is 30 kg/m .    LABS:     Hba1c: 9.7 on 12/20/21   Last Comprehensive Metabolic Panel:  Sodium   Date Value Ref Range Status   12/20/2021 127 (L) 133 - 144 mmol/L Final     Potassium   Date Value Ref Range Status   12/20/2021 4.2 3.4 - 5.3 mmol/L Final     Chloride   Date Value Ref Range Status   12/20/2021 90 (L) 94 - 109 mmol/L Final     Carbon Dioxide (CO2)   Date Value Ref Range Status   12/20/2021 32 20 - 32 mmol/L Final     Anion Gap   Date Value Ref Range Status   12/20/2021 5 3 - 14 mmol/L Final     Glucose   Date Value Ref Range Status   12/20/2021 266 (H) 70 - 99 mg/dL Final     GLUCOSE BY METER POCT   Date Value Ref Range Status   12/20/2021 239 (H) 70 - 99 mg/dL Final     Comment:     Dr/RN Notified     Urea Nitrogen   Date Value Ref Range Status   12/20/2021 25 7 - 30 mg/dL Final     Creatinine   Date Value Ref Range Status   12/20/2021 1.32 (H) 0.66 - 1.25 mg/dL Final     GFR Estimate   Date Value Ref Range Status   12/20/2021 61 >60 mL/min/1.73m2 Final     Comment:     As of July 11, 2021, eGFR is calculated by the CKD-EPI creatinine equation, without race adjustment. eGFR can be influenced by muscle mass, exercise, and diet. The reported eGFR is an estimation only and is only applicable if the renal function is stable.     Calcium   Date Value Ref Range Status   12/20/2021 8.8 8.5 - 10.1 mg/dL " Final     Lab Results   Component Value Date    WBC 6.4 12/20/2021     Lab Results   Component Value Date    RBC 4.95 12/20/2021     Lab Results   Component Value Date    HGB 13.8 12/20/2021     Lab Results   Component Value Date    HCT 40.9 12/20/2021     Lab Results   Component Value Date     12/20/2021      No results found for: INR     General appearance: Patient is alert and fully cooperative with history & exam.  No sign of distress is noted during the visit.      Respiratory: Breathing is regular & unlabored while sitting. Noted productive cough.     HEENT: Hearing is intact to spoken word.  Speech is clear.  No gross evidence of visual impairment that would impact ambulation.     Dermatologic: Right foot submet one callus noted to be dry. Noted blister midarch. Following debridement, purulence noted. See procedure note. Tender to palpation. No ascending cellulitis or fluctuance.     Vascular: Dorsalis pedis and posterior tibial pulses are intact & regular bilaterally.  CFT and skin temperature is normal to both lower extremities.      Neurologic: Lower extremity sensation is diminished, bilateral foot, to light touch.  No evidence of neurological-based weakness or contracture in the lower extremities.       Musculoskeletal: Patient is ambulatory without an assistive device or brace.  No gross foot or ankle deformity noted.       Psychiatric: Affect is pleasant & appropriate.      IMAGING:  None     PROCEDURE:   Verbal consent was obtained for debridement. A 15 blade was used to excise the nonivable tissue to the blister, down to and including subcutaneous tissue. Noted 2 ml of purulence afterwards. Ulcer measures 1cm x .5 x .2 cm. Submet one site also debrided, expressing trace purulence. No probe to bone. Well tolerated. Site was cleansed with alcohol and wrapped.       ASSESSMENT:  1. Right foot submet one ulcer with adjacent blister  2. Positive Covid infection       MEDICAL DECISION MAKING:    -Discussed with patient, will order an MRI to evaluate for further infection. Possible need for debridement if abscess formation present on MRI. None of the sites probed to bone or deep structures.  He agrees to this plan. Patient noted relief/less pain after debridement.   -Cont IV abx.   -Procedure could be done tomorrow pending MRI completion and medical clearance.   -WBAT to RLE     Thank you for the consultation request and the opportunity to participate in the care of Chris Neely DPM   [place] Department of Podiatry/Foot & Ankle Surgery  [phone]

## 2021-12-20 NOTE — ED TRIAGE NOTES
[facility]  ED Arrival Note    Arrives via EMS, per report the patient called 911 because he did not have a place to stay and it is too cold to stay in his car.       ABC's intact. A &O X4. . Pt arrives with c/o wound check in his feet and homelessness. Patient reports that he has sores in both feet from being in the cold weather. Additinally, patient reports not have a place to stay.         Visitors during triage: None      Triage Interventions: N/A    Ambulatory: Yes    Meets Stroke Criteria?: No    Meets Trauma Criteria?: No      Directed to: Triage Lobby

## 2021-12-20 NOTE — PROGRESS NOTES
"PRIMARY DIAGNOSIS: \"GENERIC\" NURSING  OUTPATIENT/OBSERVATION GOALS TO BE MET BEFORE DISCHARGE:  1. ADLs back to baseline: No    2. Activity and level of assistance: Up with standby assistance.    3. Pain status: Improved-controlled with oral pain medications.    4. Return to near baseline physical activity: No     Discharge Planner Nurse   Safe discharge environment identified: No  Barriers to discharge: Yes - safe discharge plan, MRI to be completed yet, currently on IV abx per ID       Entered by: Alberta Jerez 12/20/2021 4:10 PM     Please review provider order for any additional goals.   Nurse to notify provider when observation goals have been met and patient is ready for discharge.  "

## 2021-12-20 NOTE — H&P
[facility]  History and Physical  Hospitalist       Date of Admission:  12/19/2021    Assessment & Plan   Chris Shea is a 51 year old male with homelessness, uncontrolled diabetes with peripheral vascular disease, neuropathy, nephropathy, CKD stage II, chronic bilateral foot wounds, methamphetamine abuse, h/o tobacco abuse who was admitted to observation on 12/19/2021 for podiatry assessment and social work assistance.  Incidental Covid-19+ testing in an unvaccinated patient.    Right foot cellulitis  Chronic right mid foot ulcer  Afebrile, normal WBC, no hypotension. Lactate 1.0. Does have h/o neck abscess that was MRSA but this wound has not failed an oral antibiotic course, it's more of an incidental finding in a homeless patient with limited resources. Underwent excision and debridement of this foot ulcer 9/14/21.   -able to tolerate oral antibiotics. Start with keflex and bactrim.   -podiatry consultation  -follows at [facility] wound clinic    Cold exposure left foot  Cold left foot attributed wet boot and cold exposure in homeless patient. Lower extremity doppler with no evidence for high grade stenosis or occlusion of the left lower extremity arterial system. There were triphasic waveforms throughout the left lower extremity arterial system.   -no further evaluation needed    Poorly controlled diabetes  Neuropathy, Peripheral Vascular Disease and Nephropathy secondary to diabetes  Pseudohyponatremia  CKD stage II  Prior admission was discharged with diabetes supplies and lantus with premeal aspart. He is not taking these. Recent hemoglobin A1c 13.3% from September 2021 indication poor glycemic control and doubt it has improved with no use of recommended insulin nor previously prescribed metformin.   -consistent carb diet  -start lantus 20 units in AM and reassess response  -corrective dose aspart    COVID-19 infection in unvaccinated patient  This patient was evaluated during a  global COVID-19 pandemic, which necessitated consideration that the patient might be at risk for infection with the SARS-CoV-2 virus that causes COVID-19. Applicable protocols for evaluation were followed during the patient's care. Low suspicion for infection, however his test is positive.  He has remained afebrile and has oxygen saturation of 98 to 100%.  -Surveillance COVID-19 PCR test result => POSITIVE  -Covid precautions  -No indication for treatment with steroids nor remdesivir as he does not meet criteria at present    Methamphetamine Abuse  Noted.    Clinically Significant Risk Factors Present on Admission         # Hyponatremia: Na = 127 mmol/L (Ref range: 133 - 144 mmol/L) on admission, will monitor as appropriate           Hold all nonessential medications, vitamins, supplements given observation status.  Allow patient to take medications from his home supply if desired. These would need to be reviewed by pharmacy prior to administration.    DVT prophylaxis: Pneumatic Compression Devices and Ambulate every shift  Code Status: Full    Disposition: Expected discharge in 1-2 days.    We are operating under sub optimal conditions in the setting of a worldwide pandemic. This hospital and others in the metro area are at full capacity with limited inpatient and ICU bed availability. This has led to longer wait times in triage, the Emergency Department and outside hospitals and clinics who are referring patients. We are providing the best possible care with limited resources and staffing.     Nichole Sood MD  Text Page    ~~~~~~~~~~~~~~~~~~~~~~~~~~~~~~~~~~~~~~~~~~~~~~~~~~~~~  Primary Care Physician   Sang Edmondson    Chief Complaint   Homelessness, cold     History is obtained from the patient and medical records.    History of Present Illness   Chris Shea is a 51 year old male with homelessness, uncontrolled diabetes with peripheral vascular disease, neuropathy, nephropathy, chronic foot wounds,  "methamphetamine abuse, h/o tobacco abuse, h/o MRSA neck abscess who presents with cod exposure. Called 911 as he did not have a place to stay and his car was too cold to sleep in. Initially wondered if he should have his foot ulcers looked at and to evaluate whether he had carbon monoxide poisoning. Had wet boots and cold, dusky appearance to left foot which improved with warming. He has many complaints, including burning pain in bilateral lower extremities, myalgias, chronic cough, nausea, fatigue.  These have been going on for many months.  He explains that he has been sleeping in some sort of garage area and therefore breathing in exhaust.  He is convinced that he has some \"long-term carbon monoxide poisoning\" that someone needs to look into.  He was not reassured with his very normal lab profile and oxygenation and was unwilling to consider that perhaps some of his symptoms are related to longstanding, uncontrolled diabetes.  Denies vomiting, diarrhea, blood in stool.  His foot ulcers have been there for quite some time and he has been seen by podiatry for this in the past.  These are not his biggest concern today.  His request is that we \"go over everything\" and fix all that is wrong with him.    Reports that he did get diabetes supplies and glucose at his last hospitalization in September 2021.  However, he was not able to use a needles medicines because they are somehow locked in a different car.  Suggested that he try gabapentin to treat his peripheral neuropathy symptoms and he right away said \"No!\" as he felt it changed his personality and made him tired.    Case reviewed with Dr. Carlson from the Emergency Department. Labs and imaging reviewed.     Past Medical History    I have reviewed this patient's medical history and updated it with pertinent information if needed.   Past Medical History:   Diagnosis Date     Methamphetamine abuse (H)      Right foot ulcer (H)      Type 2 diabetes mellitus with " diabetic peripheral angiopathy without gangrene, without long-term current use of insulin (H)      Past Surgical History   I have reviewed this patient's surgical history and updated it with pertinent information if needed.  Past Surgical History:   Procedure Laterality Date     INCISION AND DRAINAGE NECK, COMBINED N/A 2021    Procedure: INCISION AND DRAINAGE, POSTERIOR NECK;  Surgeon: Lucina Dodson MD;  Location:  OR     Prior to Admission Medications   Prior to Admission Medications   Prescriptions Last Dose Informant Patient Reported? Taking?   Alcohol Swabs PADS   No No   Sig: Use to swab the area of the injection or abilio as directed. Per insurance coverage   Sharps Container MISC   No No   Sig: Use as directed to dispose of needles, lancets and other sharps. Per Insurance coverage   acetaminophen (TYLENOL) 500 MG tablet   No No   Sig: Take 1 tablet (500 mg) by mouth every 8 hours as needed for other (mild to moderate pain)   blood glucose (NO BRAND SPECIFIED) lancets standard   No No   Sig: To use to test glucose level in the blood. Use to test blood sugar  3  times daily as directed. To accompany glucose monitor brands per insurance coverage.   blood glucose (NO BRAND SPECIFIED) test strip   No No   Sig: To use to test glucose level in the blood. Use to test blood sugar  3 times daily as directed. To accompany glucose monitor brands per insurance coverage.   blood glucose calibration (NO BRAND SPECIFIED) solution   No No   Sig: Used to calibrate the blood glucose monitor as needed and as directed.  To accompany  blood glucose brands per insurance coverage   blood glucose monitoring (NO BRAND SPECIFIED) meter device kit   No No   Sig: Use as directed. Per insurance coverage   insulin aspart (NOVOLOG FLEXPEN) 100 UNIT/ML pen   No No   Si-20 units on sliding scale ,120-150-1 unit,[id number],201-250 4Units,[id number],[id number],351-400 -12 units ,call md if>400   insulin aspart (NOVOLOG  PEN) 100 UNIT/ML pen   No No   Sig: Inject 8 Units Subcutaneous 3 times daily (with meals)   insulin glargine (LANTUS PEN) 100 UNIT/ML pen   No No   Sig: Inject 30 Units Subcutaneous every evening   insulin pen needle (31G X 5 MM) 31G X 5 MM miscellaneous   No No   Sig: Use as directed by provider. Per insurance coverage   lisinopril (ZESTRIL) 10 MG tablet   No No   Sig: Take 1 tablet (10 mg) by mouth daily   metFORMIN (GLUCOPHAGE) 500 MG tablet   Yes No   Sig: Take 1,000 mg by mouth 2 times daily   oxyCODONE (ROXICODONE) 5 MG tablet   No No   Sig: Take 1 tablet (5 mg) by mouth 3 times daily as needed for moderate to severe pain (use as second choice to tylenol if pain not relieved with tyelnol.)   senna-docusate (SENOKOT-S/PERICOLACE) 8.6-50 MG tablet   No No   Sig: Take 1 tablet by mouth 2 times daily as needed for constipation   sulfamethoxazole-trimethoprim (BACTRIM DS) 800-160 MG tablet   No No   Sig: Take 1 tablet by mouth 2 times daily      Facility-Administered Medications: None     Allergies   No Known Allergies    Social History   I have reviewed this patient's social history and updated it with pertinent information if needed. Chris KAMARA Monse has history of tobacco abuse, methamphetamine abuse and regular alcohol use.     Family History   Dad with CAD, hypertension, emphysema. Mom with diabetes, hypertension.     Review of Systems   The 10 point Review of Systems is negative other than noted in the HPI or here.    Physical Exam   Temp: 97.9  F (36.6  C) Temp src: Temporal BP: 119/84 Pulse: 108   Resp: 18 SpO2: 98 %      Vital Signs with Ranges  Temp:  [97.9  F (36.6  C)] 97.9  F (36.6  C)  Pulse:  [108-109] 108  Resp:  [18] 18  BP: (119-139)/(73-84) 119/84  SpO2:  [95 %-100 %] 98 %  240 lbs 0 oz    Constitutional: Irritable, no respiratory distress  Eyes: PERRL, sclerae anicteric  HEENT: Dentition poor, atraumatic  No tenderness to palpation of neck  Respiratory: Lungs CTAB, no wheezes or  crackles  Cardiovascular: Tachycardic, regular, no murmurs  1+ bilateral LE edema  GI: soft, non-tender, nondistended  Skin/Integument: warm, dry, no acute rashes  Ball of right midfoot with 2 cm eschar with faint surrounding erythema, no drainage  Several other areas of bilateral feet with small scabs but no open or draining wounds  +onchymycosis   Genitourinary: not examined  Musc: CONROY, normal limb strength x 4, no calf tenderness  Neuro: AOx3, no focal deficits, no tremors  Psych: Avoids eye contact, irascible and histrionic affect, not confused      Data   Data reviewed today:  I personally reviewed:Lower extremity doppler with no evidence for high grade stenosis or occlusion of the left lower extremity arterial system. There were triphasic waveforms throughout the left lower extremity arterial system.     Recent Labs   Lab 12/20/21  0017   WBC 6.4   HGB 13.8   MCV 83      *   POTASSIUM 4.2   CHLORIDE 90*   CO2 32   BUN 25   CR 1.32*   ANIONGAP 5   ELINOR 8.8   *   ALBUMIN 2.5*   PROTTOTAL 8.5   BILITOTAL 0.5   ALKPHOS 97   ALT 19   AST 22       Imaging:  Recent Results (from the past 24 hour(s))   US Lower Extremity Arterial Duplex Left    Narrative    EXAM: US LOWER EXTREMITY ARTERIAL DUPLEX LEFT  LOCATION: [facility]  DATE/TIME: 12/20/2021 1:28 AM    INDICATION: Cool right foot. Evaluate for arterial occlusion.  COMPARISON: None.  TECHNIQUE: Duplex utilizing 2D gray-scale imaging, Doppler interrogation with color-flow and spectral waveform analysis.    FINDINGS: Color Doppler and grayscale imaging demonstrates no evidence for high-grade stenosis or occlusion of the left lower extremity arterial system. Waveform analysis demonstrates triphasic waveforms throughout the left lower extremity arterial   system.    LEFT LOWER EXTREMITY ARTERIAL ASSESSMENT:    Common femoral artery: 76 cm/s  Profunda femoris artery: 70 cm/s  SFA (proximal): 91 cm/s  SFA (mid): 98 cm/s  SFA  (distal): 77 cm/s  Popliteal artery: 57 cm/s  Posterior tibial artery: 58 cm/s  Dorsalis pedis artery: 55 cm/s      Impression    IMPRESSION:  1.  No evidence for high-grade stenosis or occlusion of the left lower extremity arterial system. Waveform analysis demonstrates triphasic waveforms throughout the left lower extremity arterial system.

## 2021-12-20 NOTE — CONSULTS
Care Management Initial Consult  Spoke with patient re plan of care and discharge plans. Patient known to writer from last admission. Patient at that time was discharged to [facility] where he had to pay $42/week.Patient had stated he was not able to pay for it and requested some funding till he gets his sliding scale insulin. Writer able to assist patient for 2 weeks with funding provided. Patient was given 30 days supply of insulin,diabetic supplies and 1 week worth of oral antibiotics.  Patient now states he did not go to the shelter but was living in his car or on  the streets. Patient did not seek any medical help for dressing changes. Patient stated he would attempt to do the dressing himself. Patient has been homeless most of his life and from our previous conversation patient is able to manage on his own.  Patient now states he is seeking employment but unable to do so due to foot pain. . Discussed returning to medical shelter once discharged.   General Information  Assessment completed with: Patient,    Type of CM/SW Visit: Offer D/C Planning    Primary Care Provider verified and updated as needed: Yes   Readmission within the last 30 days: lack of support,previous discharge plan unsuccessful      Reason for Consult: discharge planning  Advance Care Planning: Advance Care Planning Reviewed: no concerns identified          Communication Assessment  Patient's communication style: spoken language (English or Bilingual)             Cognitive  Cognitive/Neuro/Behavioral: WDL                      Living Environment:   People in home:  (alone homeless)     Current living Arrangements: homeless      Able to return to prior arrangements: yes       Family/Social Support:  Care provided by: self  Provides care for: no one  Marital Status: Single  None          Description of Support System:           Current Resources:   Patient receiving home care services:       Community Resources:    Equipment currently used at  home:    Supplies currently used at home:      Employment/Financial:  Employment Status: unemployed        Financial Concerns: unable to afford food,unable to afford medication(s),unable to afford rent/mortgage,unemployed           Lifestyle & Psychosocial Needs:  Social Determinants of Health     Tobacco Use: Unknown     Smoking Tobacco Use: Never Smoker     Smokeless Tobacco Use: Unknown   Alcohol Use: Not on file   Financial Resource Strain: Not on file   Food Insecurity: Not on file   Transportation Needs: Not on file   Physical Activity: Not on file   Stress: Not on file   Social Connections: Not on file   Intimate Partner Violence: Not on file   Depression: Not on file   Housing Stability: Not on file       Functional Status:  Prior to admission patient needed assistance:   Dependent ADLs:: Independent  Dependent IADLs:: Independent       Mental Health Status:  Mental Health Status: Past Concern  Mental Health Management: Psychiatrist    Chemical Dependency Status:  Chemical Dependency Status: Past Concern  Chemical Dependency Management: AA          Values/Beliefs:  Spiritual, Cultural Beliefs, Buddhist Practices, Values that affect care: yes       Cultural/Buddhist Practices Patient Routinely Participates In: leeanna Parker RN

## 2021-12-20 NOTE — CONSULTS
[facility]    Infectious Disease Consultation     Date of Admission:  12/19/2021  Date of Consult (When I saw the patient): 12/20/21    Assessment & Plan   Chris Shea is a 51 year old male who was admitted on 12/19/2021.     Impression:  1. 51 y.o male with diabetes.   2. Methamphetamine use.   3. Chronic right foot ulcer.   4. Admitted on 9/12/2021 for sepsis due to a large neck abscess, s/p I&D cultures with MRSA, treated with IV antibiotics inpatient followed by oral bactrim. Patient tells me today he never took any oral antibiotics, never picked from pharmacy. Blood cultures that admission were negative.   5. Admitted now because has no place to live, tells me his homeless situation has gotten worse.   6. Tested positive for covid, some cough, no shortness of breath, no fever, CXR clear.   7. CT scan shows continued fluid collection.   8. Very poor dentition.     Recommendations:   Get a set of blood cultures  Surgery/ ENT consult.   Will follow up podiatry plan on the right foot ulcer.   Vancomycin for now also pedro Miller MD    Reason for Consult   Reason for consult: I was asked to evaluate this patient for neck abscesses, foot ulcer, history of MRSA in the neck abscesses .    Primary Care Physician   Sang Edmondson    Chief Complaint   No place to sleep     History is obtained from the patient and medical records    History of Present Illness   Chris Shea is a 51 year old male who presents with    Past Medical History   I have reviewed this patient's medical history and updated it with pertinent information if needed.   Past Medical History:   Diagnosis Date     Methamphetamine abuse (H)      Right foot ulcer (H)      Type 2 diabetes mellitus with diabetic peripheral angiopathy without gangrene, without long-term current use of insulin (H)        Past Surgical History   I have reviewed this patient's surgical history and updated it with pertinent information  if needed.  Past Surgical History:   Procedure Laterality Date     INCISION AND DRAINAGE NECK, COMBINED N/A 2021    Procedure: INCISION AND DRAINAGE, POSTERIOR NECK;  Surgeon: Lucina Dodson MD;  Location:  OR       Prior to Admission Medications   Prior to Admission Medications   Prescriptions Last Dose Informant Patient Reported? Taking?   Alcohol Swabs PADS   No No   Sig: Use to swab the area of the injection or abilio as directed. Per insurance coverage   Sharps Container MISC   No No   Sig: Use as directed to dispose of needles, lancets and other sharps. Per Insurance coverage   acetaminophen (TYLENOL) 500 MG tablet More than a month at Unknown time Self No Yes   Sig: Take 1 tablet (500 mg) by mouth every 8 hours as needed for other (mild to moderate pain)   blood glucose (NO BRAND SPECIFIED) lancets standard   No No   Sig: To use to test glucose level in the blood. Use to test blood sugar  3  times daily as directed. To accompany glucose monitor brands per insurance coverage.   blood glucose (NO BRAND SPECIFIED) test strip   No No   Sig: To use to test glucose level in the blood. Use to test blood sugar  3 times daily as directed. To accompany glucose monitor brands per insurance coverage.   blood glucose calibration (NO BRAND SPECIFIED) solution   No No   Sig: Used to calibrate the blood glucose monitor as needed and as directed.  To accompany  blood glucose brands per insurance coverage   blood glucose monitoring (NO BRAND SPECIFIED) meter device kit   No No   Sig: Use as directed. Per insurance coverage   insulin aspart (NOVOLOG FLEXPEN) 100 UNIT/ML pen More than a month at Unknown time Self No Yes   Si-20 units on sliding scale ,120-150-1 unit,370-560-2zpttl,201-250 4Units,[id number],[id number],351-400 -12 units ,call md if>400   insulin aspart (NOVOLOG PEN) 100 UNIT/ML pen More than a month at Unknown time Self No Yes   Sig: Inject 8 Units Subcutaneous 3 times daily (with meals)    insulin glargine (LANTUS PEN) 100 UNIT/ML pen More than a month at Unknown time Self No Yes   Sig: Inject 30 Units Subcutaneous every evening   insulin pen needle (31G X 5 MM) 31G X 5 MM miscellaneous   No No   Sig: Use as directed by provider. Per insurance coverage   lisinopril (ZESTRIL) 10 MG tablet More than a month at Unknown time Self No Yes   Sig: Take 1 tablet (10 mg) by mouth daily   metFORMIN (GLUCOPHAGE) 500 MG tablet More than a month at Unknown time Self Yes Yes   Sig: Take 1,000 mg by mouth 2 times daily   oxyCODONE (ROXICODONE) 5 MG tablet More than a month at Unknown time Self No Yes   Sig: Take 1 tablet (5 mg) by mouth 3 times daily as needed for moderate to severe pain (use as second choice to tylenol if pain not relieved with tyelnol.)   senna-docusate (SENOKOT-S/PERICOLACE) 8.6-50 MG tablet More than a month at Unknown time Self No Yes   Sig: Take 1 tablet by mouth 2 times daily as needed for constipation   sulfamethoxazole-trimethoprim (BACTRIM DS) 800-160 MG tablet More than a month at Unknown time Self No Yes   Sig: Take 1 tablet by mouth 2 times daily      Facility-Administered Medications: None     Allergies   No Known Allergies    Immunization History   Immunization History   Administered Date(s) Administered     Tdap (Adacel,Boostrix) 09/12/2021       Social History   I have reviewed this patient's social history and updated it with pertinent information if needed. Chris ASAD Shea  reports that he has never smoked. He does not have any smokeless tobacco history on file. He reports that he does not drink alcohol and does not use drugs.    Family History   I have reviewed this patient's family history and updated it with pertinent information if needed.   No family history on file.    Review of Systems   The 10 point Review of Systems is negative other than noted in the HPI or here.     Physical Exam   Temp: 98.5  F (36.9  C) Temp src: Oral BP: 122/76 Pulse: 90   Resp: 17 SpO2: 94 % O2  Device: None (Room air)    Vital Signs with Ranges  Temp:  [97.9  F (36.6  C)-99  F (37.2  C)] 98.5  F (36.9  C)  Pulse:  [] 90  Resp:  [17-18] 17  BP: (119-144)/(73-84) 122/76  SpO2:  [94 %-100 %] 94 %  240 lbs 0 oz  Body mass index is 30 kg/m .    GENERAL APPEARANCE:  awake  EYES: Eyes grossly normal to inspection  NECK: no adenopathy  RESP: lungs clear   CV: regular rates and rhythm  LYMPHATICS: normal ant/post cervical and supraclavicular nodes  ABDOMEN: soft, nontender  MS: right foot chronic ulcer   SKIN: indurated area on the back of the neck with skin changes, erythema   Psych: affect normal       Data   Lab Results   Component Value Date    WBC 6.4 12/20/2021    HGB 13.8 12/20/2021    HCT 40.9 12/20/2021     12/20/2021     (L) 12/20/2021    POTASSIUM 4.2 12/20/2021    CHLORIDE 90 (L) 12/20/2021    CO2 32 12/20/2021    BUN 25 12/20/2021    CR 1.32 (H) 12/20/2021     (H) 12/20/2021    DD 2.24 (H) 12/20/2021    AST 22 12/20/2021    ALT 19 12/20/2021    ALKPHOS 97 12/20/2021    BILITOTAL 0.5 12/20/2021     No results for input(s): CULT in the last 168 hours.  No lab results found.    Invalid input(s): UC

## 2021-12-20 NOTE — PROGRESS NOTES
RECEIVING UNIT ED HANDOFF REVIEW    ED Nurse Handoff Report was reviewed by: Alberta Jerez RN on December 20, 2021 at 8:16 AM

## 2021-12-20 NOTE — PHARMACY-VANCOMYCIN DOSING SERVICE
Pharmacy Vancomycin Initial Note  Date of Service 2021  Patient's  1970  51 year old, male    Indication: Abscess    Current estimated CrCl = Estimated Creatinine Clearance: 86.3 mL/min (A) (based on SCr of 1.32 mg/dL (H)).    Creatinine for last 3 days  2021: 12:17 AM Creatinine 1.32 mg/dL    Recent Vancomycin Level(s) for last 3 days  No results found for requested labs within last 72 hours.      Vancomycin IV Administrations (past 72 hours)      No vancomycin orders with administrations in past 72 hours.                Nephrotoxins and other renal medications (From now, onward)    Start     Dose/Rate Route Frequency Ordered Stop    21 1330  ampicillin-sulbactam (UNASYN) 3 g vial to attach to  mL bag         3 g  over 15-30 Minutes Intravenous EVERY 6 HOURS 21 1314      21 1330  vancomycin 2000 mg in 0.9% NaCl 500 ml intermittent infusion 2,000 mg         2,000 mg  over 2 Hours Intravenous ONCE 21 1328      21 0843  ibuprofen (ADVIL/MOTRIN) tablet 400 mg         400 mg Oral EVERY 6 HOURS PRN 21 0843            Contrast Orders - past 72 hours (72h ago, onward)            Start     Dose/Rate Route Frequency Ordered Stop    21 1030  iopamidol (ISOVUE-370) solution 80 mL         80 mL Intravenous ONCE 21 1011 21 1058          [employer] Prediction of Planned Initial Vancomycin Regimen    Loading dose: Vancomycin 2000mg IV x1  Regimen: 1250 mg IV every 12 hours.  Start time: 14:15 on 2021  Exposure target: AUC24 (range)400-600 mg/L.hr   AUC24,ss: 460 mg/L.hr  Probability of AUC24 > 400: 80 %  Ctrough,ss: 13.6 mg/L  Probability of Ctrough,ss > 20: 1 %  Probability of nephrotoxicity (Lodise MARKO ): 9 %        Plan:  1. Start vancomycin  2000 mg IV x1, then vancomycin 1250 mg IV q12h   2. Vancomycin monitoring method: AUC  3. Vancomycin therapeutic monitoring goal: 400-600 mg*h/L  4. Pharmacy will check vancomycin levels as  appropriate in 1-3 Days.    5. Serum creatinine levels will be ordered.    Maddie Zhang RP

## 2021-12-20 NOTE — ED NOTES
"[facility]  ED Nurse Handoff Report    ED Chief complaint: Homeless and Wound Check      ED Diagnosis:   Final diagnoses:   Cellulitis of right lower extremity       Code Status: Full Code----- according to previous visits    Allergies: No Known Allergies    Patient Story: c/o bilateral foot pain and generalized pain all over  Focused Assessment:  Arrived via EMS. Admits to being homeless and staying in his car. He c/o bilateral foot pain but worse on right. Has blanched area to bottom of foot with redness to medial ankle area. Initially left foot felt cold to touch.     Treatments and/or interventions provided: left LE ultrasound, IV, labs, ekg, PO antibiotics  Patient's response to treatments and/or interventions: resting    To be done/followed up on inpatient unit:  monitor    Does this patient have any cognitive concerns?: none    Activity level - Baseline/Home:  Independent  Activity Level - Current:   Independent    Patient's Preferred language: English   Needed?: No    Isolation: covid-19 sppecial precautions, contact  Infection:MRSA, covid +  Patient tested for COVID 19 prior to admission: YES  Bariatric?: No    Vital Signs:   Vitals:    12/19/21 2302 12/19/21 2303 12/20/21 0250 12/20/21 0252   BP:  139/73 119/84    Pulse: 109  108    Resp: 18      Temp: 97.9  F (36.6  C)      TempSrc: Temporal      SpO2: 100%  95% 98%   Weight: 108.9 kg (240 lb)      Height: 1.905 m (6' 3\")          Cardiac Rhythm:     Was the PSS-3 completed:   Yes  What interventions are required if any?               Family Comments: none present  OBS brochure/video discussed/provided to patient/family: Yes              Name of person given brochure if not patient:               Relationship to patient:     For the majority of the shift this patient's behavior was Green.   Behavioral interventions performed were .    ED NURSE PHONE NUMBER: [phone]         "

## 2021-12-20 NOTE — ED PROVIDER NOTES
History     Chief Complaint:  Homeless and Wound Check       HPI   Chris Shea is a 51 year old male who presents with right foot pain.  He had been in the hospital 3 months ago and had a foot debridement for a chronic wound at that time. Pt reports he has been living in a garage, but was kicked out recently and staying in his car, but has been unable to start it.  He reports he has been dealing with pain in his foot since discharge and feels it is infected.  He also reports his left foot is cold.    Allergies:  No Known Allergies     Medications:    acetaminophen (TYLENOL) 500 MG tablet  Alcohol Swabs PADS  blood glucose (NO BRAND SPECIFIED) lancets standard  blood glucose (NO BRAND SPECIFIED) test strip  blood glucose calibration (NO BRAND SPECIFIED) solution  blood glucose monitoring (NO BRAND SPECIFIED) meter device kit  insulin aspart (NOVOLOG FLEXPEN) 100 UNIT/ML pen  insulin aspart (NOVOLOG PEN) 100 UNIT/ML pen  insulin glargine (LANTUS PEN) 100 UNIT/ML pen  insulin pen needle (31G X 5 MM) 31G X 5 MM miscellaneous  lisinopril (ZESTRIL) 10 MG tablet  metFORMIN (GLUCOPHAGE) 500 MG tablet  oxyCODONE (ROXICODONE) 5 MG tablet  senna-docusate (SENOKOT-S/PERICOLACE) 8.6-50 MG tablet  [employer] MISC  sulfamethoxazole-trimethoprim (BACTRIM DS) 800-160 MG tablet        Past Medical History:    Past Medical History:   Diagnosis Date     Methamphetamine abuse (H)      Right foot ulcer (H)      Type 2 diabetes mellitus with diabetic peripheral angiopathy without gangrene, without long-term current use of insulin (H)        Patient Active Problem List    Diagnosis Date Noted     Cellulitis of right lower extremity 12/20/2021     Priority: Medium     Cellulitis and abscess of neck 09/12/2021     Priority: Medium     Ulcer of right foot, unspecified ulcer stage (H) 09/12/2021     Priority: Medium     Acute sepsis (H) 09/12/2021     Priority: Medium        Past Surgical History:    Past Surgical History:  "  Procedure Laterality Date     INCISION AND DRAINAGE NECK, COMBINED N/A 9/14/2021    Procedure: INCISION AND DRAINAGE, POSTERIOR NECK;  Surgeon: Lucina Dodson MD;  Location: [facility] OR        Family History:    family history is not on file.    Social History:   reports that he has never smoked. He does not have any smokeless tobacco history on file. He reports that he does not drink alcohol and does not use drugs.    PCP: Sang Edmondson     Review of Systems   Constitutional: Positive for fatigue. Negative for fever.   Respiratory: Negative for cough and shortness of breath.    Cardiovascular: Negative for chest pain.   Gastrointestinal: Negative for abdominal pain, nausea and vomiting.   Musculoskeletal: Positive for gait problem.   Skin: Positive for rash and wound.   All other systems reviewed and are negative.        Physical Exam     Patient Vitals for the past 24 hrs:   BP Temp Temp src Pulse Resp SpO2 Height Weight   12/20/21 0252 -- -- -- -- -- 98 % -- --   12/20/21 0250 119/84 -- -- 108 -- 95 % -- --   12/19/21 2303 139/73 -- -- -- -- -- -- --   12/19/21 2302 -- 97.9  F (36.6  C) Temporal 109 18 100 % 1.905 m (6' 3\") 108.9 kg (240 lb)        Physical Exam  General: Appears well-developed and well-nourished.   Head: No signs of trauma.   Mouth/Throat: Oropharynx is clear and moist.   CV: Normal rate and regular rhythm.    Resp: Effort normal and breath sounds normal. No respiratory distress.   GI: Soft. There is no tenderness.  No rebound or guarding.  Normal bowel sounds.   MSK: Mild swelling to right leg  Neuro: The patient is alert and oriented.  Strength in upper/lower extremities normal and symmetrical. Sensation normal. Speech normal.  Skin:  Chronic appearing ulceration to dorsum of right foot.  Large white blister also on dorsum of right foot with associated erythema.  Left foot is cold to touch.  2+ DP pulses bilaterally.  Psych: normal mood and affect. behavior is normal.               Emergency " Department Course     ECG   Rate: 109  Rhythm: Sinus tach with PAC  QTc: 422  Please see EKG for full interpretation       Imaging:  US Lower Extremity Arterial Duplex Left   Final Result   IMPRESSION:   1.  No evidence for high-grade stenosis or occlusion of the left lower extremity arterial system. Waveform analysis demonstrates triphasic waveforms throughout the left lower extremity arterial system.         All imaging results were discussed with the pt who voiced understanding of the findings.    Laboratory:  Labs Ordered and Resulted from Time of ED Arrival to Time of ED Departure   COMPREHENSIVE METABOLIC PANEL - Abnormal       Result Value    Sodium 127 (*)     Potassium 4.2      Chloride 90 (*)     Carbon Dioxide (CO2) 32      Anion Gap 5      Urea Nitrogen 25      Creatinine 1.32 (*)     Calcium 8.8      Glucose 266 (*)     Alkaline Phosphatase 97      AST 22      ALT 19      Protein Total 8.5      Albumin 2.5 (*)     Bilirubin Total 0.5      GFR Estimate 61     COVID-19 VIRUS (CORONAVIRUS) BY PCR - Abnormal    SARS CoV2 PCR Positive (*)    LACTIC ACID WHOLE BLOOD - Normal    Lactic Acid 1.0     KETONE BETA-HYDROXYBUTYRATE QUANTITATIVE, RAPID - Normal    Ketone (Beta-Hydroxybutyrate) Quantitative 0.5     ETHYL ALCOHOL LEVEL - Normal    Alcohol ethyl <0.01     CBC WITH PLATELETS AND DIFFERENTIAL    WBC Count 6.4      RBC Count 4.95      Hemoglobin 13.8      Hematocrit 40.9      MCV 83      MCH 27.9      MCHC 33.7      RDW 12.3      Platelet Count 258      % Neutrophils 66      % Lymphocytes 24      % Monocytes 9      % Eosinophils 0      % Basophils 0      % Immature Granulocytes 1      NRBCs per 100 WBC 0      Absolute Neutrophils 4.3      Absolute Lymphocytes 1.5      Absolute Monocytes 0.6      Absolute Eosinophils 0.0      Absolute Basophils 0.0      Absolute Immature Granulocytes 0.1      Absolute NRBCs 0.0            Interventions:  Medications   lactated ringers BOLUS 1,000 mL (0 mLs Intravenous  Stopped 12/20/21 0153)     Followed by   lactated ringers infusion (has no administration in time range)   cephALEXin (KEFLEX) capsule 500 mg (500 mg Oral Given 12/20/21 0341)   sulfamethoxazole-trimethoprim (BACTRIM DS) 800-160 MG per tablet 1 tablet (1 tablet Oral Given 12/20/21 0341)        Emergency Department Course:  Past medical records, nursing notes, and vitals reviewed.  I performed an exam of the patient and obtained history, as documented above.    I rechecked the patient. Findings and plan explained to the Patient. Patient was admitted    Impression & Plan      Medical Decision Making:  Pt presents primarily complaining of pain to right foot and concern for infection.  He does have a chronic appearing ulcer, but also a fairly large blister on dorsum of right foot with some erythema.  I did note pt's left foot to be very cold on initial evaluation, although he had normal pulses.  I obtained an US of the left leg, which was reassuring and on repeat evaluation the foot had warmed was likely cold secondary to exposure.  I ultimately started pt on antibiotics for cellulitis of the left foot and admitted pt given his lack of resources and ability to follow up.    Diagnosis:    ICD-10-CM    1. Cellulitis of right lower extremity  L03.115         Discharge Medications:  New Prescriptions    No medications on file        12/20/2021   Sang Carlson MD Bergenstal, John A, MD  12/22/21 [id number]

## 2021-12-21 ENCOUNTER — ANESTHESIA (OUTPATIENT)
Dept: MEDSURG UNIT | Facility: CLINIC | Age: 51
End: 2021-12-21
Payer: COMMERCIAL

## 2021-12-21 ENCOUNTER — APPOINTMENT (OUTPATIENT)
Dept: CT IMAGING | Facility: CLINIC | Age: 51
End: 2021-12-21
Attending: PHYSICIAN ASSISTANT
Payer: COMMERCIAL

## 2021-12-21 ENCOUNTER — ANESTHESIA EVENT (OUTPATIENT)
Dept: MEDSURG UNIT | Facility: CLINIC | Age: 51
End: 2021-12-21
Payer: COMMERCIAL

## 2021-12-21 LAB
AMPHETAMINES UR QL SCN: ABNORMAL
ANION GAP SERPL CALCULATED.3IONS-SCNC: 7 MMOL/L (ref 3–14)
BARBITURATES UR QL: ABNORMAL
BENZODIAZ UR QL: ABNORMAL
BUN SERPL-MCNC: 20 MG/DL (ref 7–30)
CALCIUM SERPL-MCNC: 8.2 MG/DL (ref 8.5–10.1)
CANNABINOIDS UR QL SCN: ABNORMAL
CHLORIDE BLD-SCNC: 98 MMOL/L (ref 94–109)
CO2 SERPL-SCNC: 26 MMOL/L (ref 20–32)
COCAINE UR QL: ABNORMAL
CREAT SERPL-MCNC: 1.25 MG/DL (ref 0.66–1.25)
ERYTHROCYTE [DISTWIDTH] IN BLOOD BY AUTOMATED COUNT: 12.3 % (ref 10–15)
GFR SERPL CREATININE-BSD FRML MDRD: 69 ML/MIN/1.73M2
GLUCOSE BLD-MCNC: 149 MG/DL (ref 70–99)
GLUCOSE BLDC GLUCOMTR-MCNC: 148 MG/DL (ref 70–99)
GLUCOSE BLDC GLUCOMTR-MCNC: 160 MG/DL (ref 70–99)
HCT VFR BLD AUTO: 36.8 % (ref 40–53)
HGB BLD-MCNC: 12.1 G/DL (ref 13.3–17.7)
HOLD SPECIMEN: NORMAL
MCH RBC QN AUTO: 27.9 PG (ref 26.5–33)
MCHC RBC AUTO-ENTMCNC: 32.9 G/DL (ref 31.5–36.5)
MCV RBC AUTO: 85 FL (ref 78–100)
OPIATES UR QL SCN: ABNORMAL
PCP UR QL SCN: ABNORMAL
PLATELET # BLD AUTO: 264 10E3/UL (ref 150–450)
POTASSIUM BLD-SCNC: 4.2 MMOL/L (ref 3.4–5.3)
RADIOLOGIST FLAGS: ABNORMAL
RBC # BLD AUTO: 4.33 10E6/UL (ref 4.4–5.9)
SODIUM SERPL-SCNC: 131 MMOL/L (ref 133–144)
VANCOMYCIN SERPL-MCNC: 8.5 MG/L
WBC # BLD AUTO: 5.9 10E3/UL (ref 4–11)

## 2021-12-21 PROCEDURE — 250N000013 HC RX MED GY IP 250 OP 250 PS 637: Performed by: STUDENT IN AN ORGANIZED HEALTH CARE EDUCATION/TRAINING PROGRAM

## 2021-12-21 PROCEDURE — 96368 THER/DIAG CONCURRENT INF: CPT

## 2021-12-21 PROCEDURE — 36415 COLL VENOUS BLD VENIPUNCTURE: CPT | Performed by: HOSPITALIST

## 2021-12-21 PROCEDURE — G0378 HOSPITAL OBSERVATION PER HR: HCPCS

## 2021-12-21 PROCEDURE — 120N000004 HC R&B MS OVERFLOW

## 2021-12-21 PROCEDURE — 258N000003 HC RX IP 258 OP 636: Performed by: INTERNAL MEDICINE

## 2021-12-21 PROCEDURE — 80307 DRUG TEST PRSMV CHEM ANLYZR: CPT | Performed by: INTERNAL MEDICINE

## 2021-12-21 PROCEDURE — 250N000011 HC RX IP 250 OP 636: Performed by: INTERNAL MEDICINE

## 2021-12-21 PROCEDURE — 99233 SBSQ HOSP IP/OBS HIGH 50: CPT | Performed by: PHYSICIAN ASSISTANT

## 2021-12-21 PROCEDURE — 71275 CT ANGIOGRAPHY CHEST: CPT

## 2021-12-21 PROCEDURE — 250N000013 HC RX MED GY IP 250 OP 250 PS 637: Performed by: PHYSICIAN ASSISTANT

## 2021-12-21 PROCEDURE — 85014 HEMATOCRIT: CPT | Performed by: HOSPITALIST

## 2021-12-21 PROCEDURE — 250N000011 HC RX IP 250 OP 636: Performed by: HOSPITALIST

## 2021-12-21 PROCEDURE — 96372 THER/PROPH/DIAG INJ SC/IM: CPT

## 2021-12-21 PROCEDURE — 99232 SBSQ HOSP IP/OBS MODERATE 35: CPT | Mod: 57 | Performed by: PODIATRIST

## 2021-12-21 PROCEDURE — 96366 THER/PROPH/DIAG IV INF ADDON: CPT

## 2021-12-21 PROCEDURE — 36415 COLL VENOUS BLD VENIPUNCTURE: CPT | Performed by: INTERNAL MEDICINE

## 2021-12-21 PROCEDURE — 250N000013 HC RX MED GY IP 250 OP 250 PS 637: Performed by: HOSPITALIST

## 2021-12-21 PROCEDURE — 82962 GLUCOSE BLOOD TEST: CPT

## 2021-12-21 PROCEDURE — 250N000009 HC RX 250: Performed by: INTERNAL MEDICINE

## 2021-12-21 PROCEDURE — 99231 SBSQ HOSP IP/OBS SF/LOW 25: CPT | Performed by: SURGERY

## 2021-12-21 PROCEDURE — 82310 ASSAY OF CALCIUM: CPT | Performed by: HOSPITALIST

## 2021-12-21 PROCEDURE — 80202 ASSAY OF VANCOMYCIN: CPT | Performed by: INTERNAL MEDICINE

## 2021-12-21 RX ORDER — GINSENG 100 MG
CAPSULE ORAL
Status: DISCONTINUED
Start: 2021-12-21 | End: 2021-12-22 | Stop reason: HOSPADM

## 2021-12-21 RX ORDER — LIDOCAINE HYDROCHLORIDE 10 MG/ML
INJECTION, SOLUTION EPIDURAL; INFILTRATION; INTRACAUDAL; PERINEURAL
Status: DISCONTINUED
Start: 2021-12-21 | End: 2021-12-22 | Stop reason: HOSPADM

## 2021-12-21 RX ORDER — IOPAMIDOL 755 MG/ML
79 INJECTION, SOLUTION INTRAVASCULAR ONCE
Status: COMPLETED | OUTPATIENT
Start: 2021-12-21 | End: 2021-12-21

## 2021-12-21 RX ORDER — BUPIVACAINE HYDROCHLORIDE 5 MG/ML
INJECTION, SOLUTION EPIDURAL; INTRACAUDAL
Status: DISCONTINUED
Start: 2021-12-21 | End: 2021-12-22 | Stop reason: HOSPADM

## 2021-12-21 RX ORDER — ACETAMINOPHEN 500 MG
500 TABLET ORAL EVERY 8 HOURS PRN
Status: DISCONTINUED | OUTPATIENT
Start: 2021-12-21 | End: 2021-12-22

## 2021-12-21 RX ADMIN — GUAIFENESIN 10 ML: 200 SOLUTION ORAL at 12:55

## 2021-12-21 RX ADMIN — AMPICILLIN SODIUM AND SULBACTAM SODIUM 3 G: 2; 1 INJECTION, POWDER, FOR SOLUTION INTRAMUSCULAR; INTRAVENOUS at 14:35

## 2021-12-21 RX ADMIN — AMPICILLIN SODIUM AND SULBACTAM SODIUM 3 G: 2; 1 INJECTION, POWDER, FOR SOLUTION INTRAMUSCULAR; INTRAVENOUS at 21:32

## 2021-12-21 RX ADMIN — AMPICILLIN SODIUM AND SULBACTAM SODIUM 3 G: 2; 1 INJECTION, POWDER, FOR SOLUTION INTRAMUSCULAR; INTRAVENOUS at 08:33

## 2021-12-21 RX ADMIN — INSULIN ASPART 1 UNITS: 100 INJECTION, SOLUTION INTRAVENOUS; SUBCUTANEOUS at 12:47

## 2021-12-21 RX ADMIN — ACETAMINOPHEN 500 MG: 500 TABLET, FILM COATED ORAL at 21:31

## 2021-12-21 RX ADMIN — VANCOMYCIN HYDROCHLORIDE 1250 MG: 5 INJECTION, POWDER, LYOPHILIZED, FOR SOLUTION INTRAVENOUS at 16:48

## 2021-12-21 RX ADMIN — ENOXAPARIN SODIUM 40 MG: 40 INJECTION SUBCUTANEOUS at 12:46

## 2021-12-21 RX ADMIN — INSULIN GLARGINE 20 UNITS: 100 INJECTION, SOLUTION SUBCUTANEOUS at 08:43

## 2021-12-21 RX ADMIN — MELATONIN 5 MG TABLET 5 MG: at 21:31

## 2021-12-21 RX ADMIN — VANCOMYCIN HYDROCHLORIDE 1250 MG: 5 INJECTION, POWDER, LYOPHILIZED, FOR SOLUTION INTRAVENOUS at 04:22

## 2021-12-21 RX ADMIN — SODIUM CHLORIDE 100 ML: 9 INJECTION, SOLUTION INTRAVENOUS at 18:14

## 2021-12-21 RX ADMIN — AMPICILLIN SODIUM AND SULBACTAM SODIUM 3 G: 2; 1 INJECTION, POWDER, FOR SOLUTION INTRAMUSCULAR; INTRAVENOUS at 02:22

## 2021-12-21 RX ADMIN — IOPAMIDOL 79 ML: 755 INJECTION, SOLUTION INTRAVENOUS at 18:14

## 2021-12-21 ASSESSMENT — ACTIVITIES OF DAILY LIVING (ADL)
ADLS_ACUITY_SCORE: 16
ADLS_ACUITY_SCORE: 15
ADLS_ACUITY_SCORE: 16
ADLS_ACUITY_SCORE: 15
ADLS_ACUITY_SCORE: 16
ADLS_ACUITY_SCORE: 16

## 2021-12-21 ASSESSMENT — LIFESTYLE VARIABLES: TOBACCO_USE: 1

## 2021-12-21 NOTE — PROGRESS NOTES
"PRIMARY DIAGNOSIS: \"GENERIC\" NURSING  OUTPATIENT/OBSERVATION GOALS TO BE MET BEFORE DISCHARGE:  1. ADLs back to baseline: No    2. Activity and level of assistance: Up with standby assistance.    3. Pain status: Improved-controlled with oral pain medications.    4. Return to near baseline physical activity: No     Discharge Planner Nurse   Safe discharge environment identified: No  Barriers to discharge: Yes - safe discharge plan, MRI to be completed yet, currently on IV abx per ID       Entered by: Dori Velazquez 12/21/2021 2:57 AM     Please review provider order for any additional goals.   Nurse to notify provider when observation goals have been met and patient is ready for discharge.  "

## 2021-12-21 NOTE — PLAN OF CARE
Patient A&0x4. Mood is anxious and frustrated. Affect is tense. He is up with 1 GB and walker. WBAT on R foot. Dressing on R foot CDI. Was to have I&D this shift but patient declined. Plan for NPO at midnight and to surgery tomorrow for I&D. No edema noted. C/o baseline numbness in bilateral feet and legs up to the knees. DP and PT pulses are +2.  In isolation for Covid and MRSA. Lungs diminished. Has a frequent dry cough. On RA. On Intermittent IV abx. Denies pain.

## 2021-12-21 NOTE — PLAN OF CARE
Pt A&Ox4. Refused vitals this shift, also refused BG check/insulin; pt very anxious and frustrated. Ax1 gb/walker. WBAT on R foot. R foot dressing- CDI. C/o baseline numbness in bilat LE. Pulses 2+. Covid and MRSA isolation precautions maintained. LS diminished. Has a frequent dry cough. PIV SL; intermit IV abx. Denies pain. Pt refused I&D today- but agreeable to getting it tomorrow. NPO at midnight.

## 2021-12-21 NOTE — PROGRESS NOTES
"General Surgery Progress Note    Admission Date: 12/19/2021  Today's Date: 12/21/2021         Assessment:      Chris Shea is a 51 year old male who is Covid +, homeless, and sufferes from uncontrolled diabetes with peripheral vascular disease, neuropathy, CKD stage II, and bilateral foot wounds    He also had recent Right posterior neck abscess I&D'd at his last admission mid September.  This has been healing without concerns.           Plan:   Continued management from Medical team and Podiatry.  No indication for further treatment of neck.  Follow up imaging recommended by Radiology if patient were willing to return for that but he notes no complaints with the neck and likely will not follow up.    General Surgery will sign off.  Please contact if we can be of further help.  He would like to start liquids at least.  Consider clears when able.  Still currently NPO.  Dispo: per Medicine        Interval History:   No complaints with neck.  More frustrated that he cannot get sleep due to interruptions.  We are not planning on surgery for his neck, so I have told him we will sign off and have one less interruption to his sleep and he is thankful for this.  He states sleep is the only thing that helps and he would also like to start at least liquids.            Physical Exam:   /76 (BP Location: Left arm)   Pulse 96   Temp 98.6  F (37  C) (Axillary)   Resp 18   Ht 1.905 m (6' 3\")   Wt 108.9 kg (240 lb)   SpO2 95%   BMI 30.00 kg/m    I/O last 3 completed shifts:  In: 300 [P.O.:300]  Out: 600 [Urine:600]  General: NAD, pleasant, alert and oriented x3  Neck:  soft, non tender, no warmth  Incision: area has a nickel sized scab      LABS:  All laboratory data reviewed        Brenton Cox PA-C  Pager: [phone]  [facility]: [phone]       "

## 2021-12-21 NOTE — PROGRESS NOTES
[facility]    Hospitalist Progress Note      Assessment & Plan   Chris Shea is a 51 year old male with homelessness, uncontrolled diabetes with peripheral vascular disease, neuropathy, nephropathy, CKD stage II, chronic bilateral foot wounds, methamphetamine abuse, h/o tobacco abuse who was admitted to observation on 12/19/2021 for podiatry assessment and social work assistance.  Incidental Covid-19+ testing in an unvaccinated patient.     Right foot cellulitis with tenosynovitis and possible osteomyelitis  Chronic right mid foot ulcer  Afebrile, normal WBC, no hypotension. Lactate 1.0. Follows with [facility] [place] wound clinic but has not received regular cares. Underwent excision and debridement of this foot ulcer 12/20/21.   MRI foot concerning for tenosynovitis of flexor digitorum longus with possible early osteo vs stress reaction as well.   CRP is 216, procal 0.19.   --Podiatry consulted, plan for I&D and possible bone biopsy. Initially scheduled for 12/21 however patient refused as he has significant concerns about where he will receive cares following surgery given homelessness. We had tk discussion 12/21 about risks of delaying/refusing surgery including worsening infection, potential need for more aggressive surgical intervention, sepsis. Patient ultimately agreeable to proceed 12/22.   -on Unasyn and vancomycin per ID  -- ID following, appreciate assistance   -will need PT following surgery and likely TCU  --SW following     COVID-19 infection in unvaccinated patient  Incidental positive findings on screening lab. Denies dyspnea but reports occasional cough and chest pain worse with deep breathing.   He has remained afebrile and has oxygen saturation stable on room air.   CXR on admission is negative.   CRP is 216 (difficult to interpret given above infection) and D dimer 2.24.   -Surveillance COVID-19 PCR test result => POSITIVE  -Covid precautions  -No indication for treatment  with steroids nor remdesivir as he does not meet criteria at present  -- will get chest CT to rule out PE given reports of chest pain today and significant d dimer elevation      Poorly controlled diabetes  Neuropathy, Peripheral Vascular Disease and Nephropathy secondary to diabetes  Pseudohyponatremia  CKD stage II  Prior admission was discharged with diabetes supplies and lantus with premeal aspart. He is not taking these.  A1c 9.7 this admission.   -consistent carb diet  -continue lantus 20 units in AM and reassess response  -corrective dose aspart    HERNAN.   Cr on admission elevated at 1.3 from baseline 1-1.2. suspect hypovolemia.   --repeat BMP ordered, patient has been refusing  --may need some IVF but cautious use with COVID infection    Hyponatremia  Na 127 on admission. Suspect hypovolemia. Refusing repeat lab draws thus far today. Will attempt again this afternoon.     Cold exposure left foot  Cold left foot attributed wet boot and cold exposure in homeless patient. Lower extremity doppler with no evidence for high grade stenosis or occlusion of the left lower extremity arterial system. There were triphasic waveforms throughout the left lower extremity arterial system.   -no further evaluation needed     Methamphetamine Abuse  Noted. UDS positive this admission.     DVT Prophylaxis: Enoxaparin (Lovenox) subcutaneous- held in anticipation of surgery.   Code Status: Full Code    Disposition: Expected discharge in 2+ days pending post op course. Anticipate he will need TCU    Patient was discussed with Dr. Westfall who agrees with the above plan     Miya Sanders PA-C    Interval History   Patient very angry, frustrated this am. He says he does not understand why we won't let him rest a few days and are being so pushy about surgery. He is very concerned given that after his neck surgery he was left with an open wound that he could not care for and does not want this to happen again. He says he feels  "terrible and just wants to rest. We had a lengthy discussion about the infection he has and how it will very likely not improve with antibiotics alone and that surgery is important. We discussed risks of delaying surgery as above and talked about plan for PT and possible TCU post surgery if he cannot move well enough or manage his cares. He denies shortness of breath or fever but has some intermittent chest pressure. Repots intermittent nausea without vomiting and thinks he would feel better if he could eat.     Updated this afternoon about CT scan and patient very angry that we \"won't leave him alone and keep doing this stuff to him\" tells me he \"doesn't want to converse about everything just do what you want.\"    During our conversation he said we are driving him to want to kill himself because we are \"driving him mad\". Upon further clarification patient says he was just saying this for effect and does not have actual SI.     -Data reviewed today: I reviewed all new labs and imaging results over the last 24 hours. I personally reviewed no images or EKG's today.    Physical Exam   Temp: 100.3  F (37.9  C) Temp src: Oral BP: 109/61 Pulse: 94   Resp: 18 SpO2: 96 % O2 Device: None (Room air)    Vitals:    12/19/21 2302   Weight: 108.9 kg (240 lb)     Vital Signs with Ranges  Temp:  [97.7  F (36.5  C)-100.3  F (37.9  C)] 100.3  F (37.9  C)  Pulse:  [85-96] 94  Resp:  [18-20] 18  BP: ()/(61-76) 109/61  SpO2:  [94 %-98 %] 96 %  I/O last 3 completed shifts:  In: 300 [P.O.:300]  Out: 600 [Urine:600]    Constitutional: Alert and oriented. Laying down in bed. Agitated at times. Appropriately conversant   ENT:  moist mucous membranes  Eyes:  Sclera anicteric, EOMI  Respiratory: Lungs clear to auscultation bilaterally, no increased work of breathing or wheezing   Cardiovascular: Regular rate and rhythm  GI: positive bowel sounds, abdomen soft, non-tender  MSK:  Moves all four extremities. No gross deformity  Skin:  RLE " recently dressed/wrapped.   Neuro:  Speech is clear. Face symmetric. Follows commands    Medications       ampicillin-sulbactam (UNASYN) IV  3 g Intravenous Q6H     bacitracin         bupivacaine (PF)         diclofenac  4 g Topical 4x Daily     [Held by provider] enoxaparin ANTICOAGULANT  40 mg Subcutaneous Q24H     insulin aspart  1-10 Units Subcutaneous TID AC     insulin aspart  1-7 Units Subcutaneous At Bedtime     insulin glargine  20 Units Subcutaneous QAM AC     lidocaine (PF)         sodium chloride (PF)  3 mL Intracatheter Q8H     vancomycin  1,250 mg Intravenous Q12H       Data   Recent Labs   Lab 12/21/21  1214 12/21/21  0833 12/20/21  2231 12/20/21  0857 12/20/21  0017   WBC  --   --   --   --  6.4   HGB  --   --   --   --  13.8   MCV  --   --   --   --  83   PLT  --   --   --   --  258   NA  --   --   --   --  127*   POTASSIUM  --   --   --   --  4.2   CHLORIDE  --   --   --   --  90*   CO2  --   --   --   --  32   BUN  --   --   --   --  25   CR  --   --   --   --  1.32*   ANIONGAP  --   --   --   --  5   ELINOR  --   --   --   --  8.8   * 148* 151*   < > 266*   ALBUMIN  --   --   --   --  2.5*   PROTTOTAL  --   --   --   --  8.5   BILITOTAL  --   --   --   --  0.5   ALKPHOS  --   --   --   --  97   ALT  --   --   --   --  19   AST  --   --   --   --  22    < > = values in this interval not displayed.       Recent Results (from the past 24 hour(s))   MR Foot Right w/o & w Contrast   Result Value    Radiologist flags Concern for pyogenic tenosynovitis. (Urgent)    Narrative    MR right foot without and with contrast 12/20/2021 6:05 PM    History: Limping, acute, foot pain, infection suspected (Ped 0-5y)    Additional History from EMR: Right foot ulcer, painful to touch.  Submetatarsal posterior with debridement of nonviable tissue.    Techniques: Multiplanar multisequence imaging of the right foot was  obtained without and with administration of intravenous contrast.    Comparison: X-ray  9/13/2021    Findings:    No external marker, however, a skin defect and bandaging seen at the  plantar aspect of the soft tissues underlying the second metatarsal  head, presumed to correspond to recent excision.    Bones    Mild T2 signal of the medial cuneiform, intermediate cuneiform,  navicular bone with question of subtle contrast enhancement. Possible  subtle suppressed T1 signal.    Motion artifact degrading quality of the short axis T2 sequence    Joints and periarticular soft tissue    Joint effusion: Physiologic amount of joint fluid are present.    Plantar plates: Intersesamoidal ligament and sesamoidal phalangeal  ligaments of the first metatarsophalangeal joints are intact. Plantar  plates of the second through fifth toe at metatarsophalangeal joints  are grossly intact.    Intermetatarsal spaces: No interdigital neuroma. No intermetatarsal  bursitis.    Ligaments and Tendons    Lisfranc interosseous ligament: Intact.    Muscles/Soft tissues    Extensive dorsal and plantar soft tissue edema. Edematous signal and  contrast enhancement extending into the abductor pollicis longus,  flexor digitorum, and abductor digiti minimi, as well as the quadratus  plantae and intrinsic muscles of the foot with a plantar predominance.  Sinus tracking from the skin immediately plantar to the first  metatarsal head (long axis image 20) with continuity dorsally  extending along the flexor digitorum longus extending into the  quadratus plantaris with an ovoid enhancing heterogeneous T2 signa  follow-up thought to represent abscess with debris centered  immediately adjacent to the medial cuneiform, long axis image 19,  short axis image 49.    ANCILLARY FINDINGS    None      Impression    Impression:  1. Findings concerning for pyogenic tenosynovitis of the flexor  digitorum longus from the level of the excision plantar to the first  metatarsal head and extending proximally along the tendon towards a  proximal collection  thought to represent abscess with debris  immediately adjacent to the medial cuneiform in the area of the  quadratus plantae..  2. Subtle T2 signal of the navicular, intermediate cuneiform, and  navicular bones without significant T2 signal changes and subtle  contrast enhancement favoring stress reaction, however, given other  findings, early osteomyelitis is not completely excluded. Attention on  follow-up.  3.Extensive soft tissue and muscle edema/contrast-enhancement  consistent with cellulitis/myositis.    [Urgent Result: Concern for pyogenic tenosynovitis.]    Finding was identified on 12/21/2021 7:45 AM.     Dr. Neely was contacted by Dr. Allen at 12/21/2021 8:33 AM and  verbalized understanding of the urgent finding.     I have personally reviewed the examination and initial interpretation  and I agree with the findings.    MARLENE VAZ MD         SYSTEM ID:  [id number]

## 2021-12-21 NOTE — PROGRESS NOTES
"[facility]  PROGRESS NOTE - update     -Patient scheduled for right foot surgery at 1130. I received a call at about that time that he doesn't want to proceed with surgery today, stating that he wants to \"wait three days\" before surgery. Discussion was had between Hospitalist and Mr. Shea regarding the risks of delaying surgery, including worsening infection and need for further debridement or soft tissue loss. He stated he understood these risks.   -Procedure was canceled for today. OR was called to reschedule for tomorrow. Currently he'll be on the add on list and does not have a scheduled time. This was also discussed with him.   -NPO after midnight for anticipated procedure on 12/22/21.          "

## 2021-12-21 NOTE — PROGRESS NOTES
[facility]  CONSULTATION NOTE    CHIEF COMPLAINT: Right foot pain and ulcer     I was asked by Nichole Sood to evaluate this patient for right foot.    PATIENT HISTORY:  Chris Shea is a 51 year old male, who was seen in follow up for right foot. Site was opened at bedside yesterday, expressing purulence. An MRI was done. He's currently NPO. He's somewhat unhappy about this, stating his mouth is very dry and he feels terrible. No fevers.         Review of Systems:  A 10 point review of systems was performed and is positive for that noted above in the patient history.  All other areas are negative.     PAST MEDICAL HISTORY:   Past Medical History:   Diagnosis Date     Methamphetamine abuse (H)      Right foot ulcer (H)      Type 2 diabetes mellitus with diabetic peripheral angiopathy without gangrene, without long-term current use of insulin (H)         PAST SURGICAL HISTORY:   Past Surgical History:   Procedure Laterality Date     INCISION AND DRAINAGE NECK, COMBINED N/A 9/14/2021    Procedure: INCISION AND DRAINAGE, POSTERIOR NECK;  Surgeon: Lucina Dodson MD;  Location:  OR        MEDICATIONS:  Reviewed in Epic. Current.     ALLERGIES:  No Known Allergies     SOCIAL HISTORY:   Social History     Socioeconomic History     Marital status: Single     Spouse name: Not on file     Number of children: Not on file     Years of education: Not on file     Highest education level: Not on file   Occupational History     Not on file   Tobacco Use     Smoking status: Never Smoker     Smokeless tobacco: Not on file   Substance and Sexual Activity     Alcohol use: No     Drug use: No     Sexual activity: Never   Other Topics Concern     Parent/sibling w/ CABG, MI or angioplasty before 65F 55M? Not Asked   Social History Narrative     Not on file     Social Determinants of Health     Financial Resource Strain: Not on file   Food Insecurity: Not on file   Transportation Needs: Not on file  "  Physical Activity: Not on file   Stress: Not on file   Social Connections: Not on file   Intimate Partner Violence: Not on file   Housing Stability: Not on file        FAMILY HISTORY: No family history on file.     EXAM:Vitals: /76 (BP Location: Left arm)   Pulse 96   Temp 98.6  F (37  C) (Axillary)   Resp 18   Ht 1.905 m (6' 3\")   Wt 108.9 kg (240 lb)   SpO2 95%   BMI 30.00 kg/m    BMI= Body mass index is 30 kg/m .    LABS:     Hba1c: 9.7 on 12/20/21   Last Comprehensive Metabolic Panel:  Sodium   Date Value Ref Range Status   12/20/2021 127 (L) 133 - 144 mmol/L Final     Potassium   Date Value Ref Range Status   12/20/2021 4.2 3.4 - 5.3 mmol/L Final     Chloride   Date Value Ref Range Status   12/20/2021 90 (L) 94 - 109 mmol/L Final     Carbon Dioxide (CO2)   Date Value Ref Range Status   12/20/2021 32 20 - 32 mmol/L Final     Anion Gap   Date Value Ref Range Status   12/20/2021 5 3 - 14 mmol/L Final     Glucose   Date Value Ref Range Status   12/20/2021 266 (H) 70 - 99 mg/dL Final     GLUCOSE BY METER POCT   Date Value Ref Range Status   12/21/2021 148 (H) 70 - 99 mg/dL Final     Urea Nitrogen   Date Value Ref Range Status   12/20/2021 25 7 - 30 mg/dL Final     Creatinine   Date Value Ref Range Status   12/20/2021 1.32 (H) 0.66 - 1.25 mg/dL Final     GFR Estimate   Date Value Ref Range Status   12/20/2021 61 >60 mL/min/1.73m2 Final     Comment:     As of July 11, 2021, eGFR is calculated by the CKD-EPI creatinine equation, without race adjustment. eGFR can be influenced by muscle mass, exercise, and diet. The reported eGFR is an estimation only and is only applicable if the renal function is stable.     Calcium   Date Value Ref Range Status   12/20/2021 8.8 8.5 - 10.1 mg/dL Final     Lab Results   Component Value Date    WBC 6.4 12/20/2021     Lab Results   Component Value Date    RBC 4.95 12/20/2021     Lab Results   Component Value Date    HGB 13.8 12/20/2021     Lab Results   Component Value " Date    HCT 40.9 12/20/2021     Lab Results   Component Value Date     12/20/2021      No results found for: INR     General appearance: Patient is alert and fully cooperative with history & exam.  No sign of distress is noted during the visit.      Respiratory: Breathing is regular & unlabored while sitting. Noted productive cough.     HEENT: Hearing is intact to spoken word.  Speech is clear.  No gross evidence of visual impairment that would impact ambulation.     Dermatologic: Right foot submet one callus noted to be dry. Noted blister midarch. Following debridement, purulence noted. See procedure note. Tender to palpation. No ascending cellulitis or fluctuance.     Vascular: Dorsalis pedis and posterior tibial pulses are intact & regular bilaterally.  CFT and skin temperature is normal to both lower extremities.      Neurologic: Lower extremity sensation is diminished, bilateral foot, to light touch.  No evidence of neurological-based weakness or contracture in the lower extremities.       Musculoskeletal: Patient is ambulatory without an assistive device or brace.  No gross foot or ankle deformity noted.       Psychiatric: Affect is pleasant & appropriate.      IMAGING:    MRI:  Pyogenic tenosynovitis of FDL, starting along the first metatarsal head and extending to the medial cuneiform/midfoot.   -Subtle T2 changes of navicular and cuneiform bones that may be stress reaction vs early osteomyelitis     ASSESSMENT:  1. Right foot submet one ulcer with tenosynovitis   2. Positive Covid infection       MEDICAL DECISION MAKING:   -Discussed with patient. MRI findings show more extensive infection than clinical presentation. Given this, will plan I&D and possible bone biopsy today. He's at first unhappy to hear about the plan for surgery, but does ultimately agree.   -Further plans pending intraop findings.   -Cont IV abx  -Cont NPO     Thank you for the consultation request and the opportunity to  participate in the care of Chris Neely DPM   [facility]  [phone]

## 2021-12-21 NOTE — PROGRESS NOTES
"PRIMARY DIAGNOSIS: \"GENERIC\" NURSING  OUTPATIENT/OBSERVATION GOALS TO BE MET BEFORE DISCHARGE:  1. ADLs back to baseline: No    2. Activity and level of assistance: Up with standby assistance.    3. Pain status: Improved-controlled with oral pain medications.    4. Return to near baseline physical activity: No     Discharge Planner Nurse   Safe discharge environment identified: No  Barriers to discharge: Yes - safe discharge plan, MRI to be completed yet, currently on IV abx per ID       Entered by: Dori Velazquez 12/20/2021 11:19 PM     Please review provider order for any additional goals.   Nurse to notify provider when observation goals have been met and patient is ready for discharge.  "

## 2021-12-21 NOTE — PROGRESS NOTES
"PRIMARY DIAGNOSIS: \"GENERIC\" NURSING  OUTPATIENT/OBSERVATION GOALS TO BE MET BEFORE DISCHARGE:  1. ADLs back to baseline: No    2. Activity and level of assistance: Up with standby assistance.    3. Pain status: Improved-controlled with oral pain medications.    4. Return to near baseline physical activity: No     Discharge Planner Nurse   Safe discharge environment identified: No  Barriers to discharge: Yes - safe discharge plan, MRI to be completed yet, currently on IV abx per ID       Entered by: Dori Velazquez 12/21/2021 6:26 AM     Please review provider order for any additional goals.   Nurse to notify provider when observation goals have been met and patient is ready for discharge.    A/O x4. VSS RA. Up with Ax1/GB/walker. Denies pain. R foot cellulitis, dressing intact CDI. NPO. IV Abx. R foot MRI done yesterday, refer to result. PIV SL. BG AC/HS. ID/ surgery/ podiatry following.   "

## 2021-12-21 NOTE — PHARMACY-VANCOMYCIN DOSING SERVICE
Pharmacy Vancomycin Note  Date of Service 2021  Patient's  1970   51 year old, male    Indication: Abscess  Day of Therapy: 2  Current vancomycin regimen:  1250 mg IV q12h  Current vancomycin monitoring method: AUC  Current vancomycin therapeutic monitoring goal: 400-600 mg*h/L    Insight[employer] Prediction of Current Vancomycin Regimen  Loading dose: 2000mg  Regimen: 1250 mg IV every 12 hours.  Start time: 04:48 on 2021  Exposure target: AUC24 (range)400-600 mg/L.hr   AUC24,ss: 516 mg/L.hr  Probability of AUC24 > 400: 83 %  Ctrough,ss: 18.1 mg/L  Probability of Ctrough,ss > 20: 40 %  Probability of nephrotoxicity (Lodise MARKO ): 14 %    Current estimated CrCl = Estimated Creatinine Clearance: 91.2 mL/min (based on SCr of 1.25 mg/dL).    Creatinine for last 3 days  2021: 12:17 AM Creatinine 1.32 mg/dL  2021:  3:22 PM Creatinine 1.25 mg/dL    Recent Vancomycin Levels (past 3 days)  2021:  3:22 PM Vancomycin 8.5 mg/L    Vancomycin IV Administrations (past 72 hours)                   vancomycin 1250 mg in 0.9% NaCl 250 mL intermittent infusion 1,250 mg (mg) 1,250 mg New Bag 21 1648     1,250 mg New Bag  0422    vancomycin 2000 mg in 0.9% NaCl 500 ml intermittent infusion 2,000 mg (mg) 2,000 mg New Bag 21 1424                Nephrotoxins and other renal medications (From now, onward)    Start     Dose/Rate Route Frequency Ordered Stop    21 0300  vancomycin 1250 mg in 0.9% NaCl 250 mL intermittent infusion 1,250 mg         1,250 mg  over 90 Minutes Intravenous EVERY 12 HOURS 21 1425      21 1330  ampicillin-sulbactam (UNASYN) 3 g vial to attach to  mL bag         3 g  over 15-30 Minutes Intravenous EVERY 6 HOURS 21 1314      21 0843  ibuprofen (ADVIL/MOTRIN) tablet 400 mg         400 mg Oral EVERY 6 HOURS PRN 21 0843               Contrast Orders - past 72 hours (72h ago, onward)            Start     Dose/Rate Route  Frequency Ordered Stop    12/20/21 1700  gadobutrol (GADAVIST) injection 11 mL         11 mL Intravenous ONCE 12/20/21 1642 12/20/21 1805    12/20/21 1030  iopamidol (ISOVUE-370) solution 80 mL         80 mL Intravenous ONCE 12/20/21 1011 12/20/21 1058          Interpretation of levels and current regimen:  Vancomycin level is reflective of -600    Has serum creatinine changed greater than 50% in last 72 hours: No    Urine output:  unable to determine    Renal Function: Improving    Insight[employer] Prediction of Planned New Vancomycin Regimen       Plan:  1. Continue Current Dose  2. Vancomycin monitoring method: AUC  3. Vancomycin therapeutic monitoring goal: 400-600 mg*h/L  4. Pharmacy will check vancomycin levels as appropriate in 1-3 Days.  5. Serum creatinine levels will be ordered daily for the first week of therapy and at least twice weekly for subsequent weeks.    Sohail Meehan [facility]

## 2021-12-21 NOTE — PROGRESS NOTES
[facility]    Infectious Disease Progress Note    Date of Service (when I saw the patient): 12/21/2021     Assessment & Plan   Chris Shea is a 51 year old male who was admitted on 12/19/2021.       Impression:  1. 51 y.o male with diabetes.   2. Methamphetamine use.   3. Chronic right foot ulcer.   4. Admitted on 9/12/2021 for sepsis due to a large neck abscess, s/p I&D cultures with MRSA, treated with IV antibiotics inpatient followed by oral bactrim. Patient tells me today he never took any oral antibiotics, never picked from pharmacy. Blood cultures that admission were negative.   5. Admitted now because has no place to live, tells me his homeless situation has gotten worse.   6. Tested positive for covid, some cough, no shortness of breath, no fever, CXR clear.   7. CT scan shows continued fluid collection.   8. Very poor dentition.      Recommendations:   Pending blood cultures  Surgery consult noted, no plans for I and D   Will follow up podiatry plan on the right foot ulcer. ( MRI noted for extensive infection)   Continue on Vancomycin for now also pedro Miller MD    Interval History   Tolerating antibiotics ok   No new rashes or issues with antibiotics   Labs reviewed   Afebrile     Physical Exam   Temp: 98.6  F (37  C) Temp src: Axillary BP: 127/76 Pulse: 96   Resp: 18 SpO2: 95 % O2 Device: None (Room air)    Vitals:    12/19/21 2302   Weight: 108.9 kg (240 lb)     Vital Signs with Ranges  Temp:  [97.7  F (36.5  C)-98.8  F (37.1  C)] 98.6  F (37  C)  Pulse:  [85-96] 96  Resp:  [17-20] 18  BP: ()/(68-76) 127/76  SpO2:  [94 %-98 %] 95 %    Constitutional: Awake, alert, cooperative, no apparent distress  Lungs: Clear to auscultation bilaterally, no crackles or wheezing  Cardiovascular: Regular rate and rhythm, normal S1 and S2, and no murmur noted  Abdomen: Normal bowel sounds, soft, non-distended, non-tender  Skin: No rashes, no cyanosis, no  edema  Other:    Medications       ampicillin-sulbactam (UNASYN) IV  3 g Intravenous Q6H     bacitracin         bupivacaine (PF)         diclofenac  4 g Topical 4x Daily     enoxaparin ANTICOAGULANT  40 mg Subcutaneous Q24H     insulin aspart  1-10 Units Subcutaneous TID AC     insulin aspart  1-7 Units Subcutaneous At Bedtime     insulin glargine  20 Units Subcutaneous QAM AC     lidocaine (PF)         sodium chloride (PF)  3 mL Intracatheter Q8H     vancomycin  1,250 mg Intravenous Q12H       Data   All microbiology laboratory data reviewed.  Recent Labs   Lab Test 12/20/21  0017 09/22/21  0808 09/16/21  0927   WBC 6.4 9.9 9.6   HGB 13.8 12.3* 12.2*   HCT 40.9 37.9* 38.0*   MCV 83 84 85    515* 304     Recent Labs   Lab Test 12/20/21  0017 09/21/21  0956 09/20/21  0734   CR 1.32* 1.19 1.03     No lab results found.  No lab results found.    Invalid input(s): CHE

## 2021-12-21 NOTE — UTILIZATION REVIEW
Admission Status; Secondary Review Determination     Under the authority of the Utilization Management Commitee, the utilization review process indicated a secondary review on the above patient. The review outcome is based on review of the medical records, discussions with staff, and applying clinical experience noted on the date of the review.     (x) Inpatient Status Appropriate - This patient's medical care is consistent with medical management for inpatient care and reasonable inpatient medical practice.     RATIONALE FOR DETERMINATION:  51 year old male with homelessness, uncontrolled diabetes with peripheral vascular disease, neuropathy, nephropathy, CKD stage II, chronic bilateral foot wounds, methamphetamine abuse, h/o tobacco abuse who was admitted to observation on 12/19/2021 for right foot ulcer with adjacent blister and concern for cellulitis with possible deep infection.  Incidental Covid-19+ testing in an unvaccinated patient.    MRI was obtained showing findings concerning for pyogenic tenosynovitis of the flexor digitorum longus and a proximal fluid collection thought to represent abscess with debris immediately adjacent to the medial cuneiform..     Podiatry and ID consults have been obtained.  The patient is receiving IV vancomycin and IV Unasyn.       The patient is not receiving any treatment for COVID as he is not hypoxic and has no infiltrates on imaging.      Given concern for deep foot infection in the presence of diabetic foot ulcer and need for IV antibiotics, inpatient status is appropriate.      I notified the [facility] caring for this patient my recommendation for inpatient status via text page today.      At the time of admission with the information available to the attending physician more than 2 nights Hospital complex care was anticipated, based on patient risk of adverse outcome if treated as outpatient and complex care required. Inpatient admission is appropriate based on the Medicare  guidelines.    The information on this document is developed by the utilization review team in order for the business office to ensure compliance. This only denotes the appropriateness of proper admission status and does not reflect the quality of care rendered.   The definitions of Inpatient Status and Observation Status used in making the determination above are those provided in the CMS Coverage Manual, Chapter 1 and Chapter 6, section 70.4.     Sincerely,     Boris Hill MD  Utilization Review   Physician Advisor   [facility]

## 2021-12-21 NOTE — PLAN OF CARE
Patient alert and oriented x4, pleasant, cooperative. VSS on RA. Has occasional dry cough, LS clear/diminished, PRN robitussin effective. Right foot with ulcer and blister. Podiatry consult - MRI right foot ordered, dressed right foot, remains CDI. Can WBAT on RLE when oob. Recent hospitalization for sepsis r/t neck abscess, patient states he never took PO abx at discharge, never picked them up from the pharmacy d/t social stressors - homeless now. CT of neck this admission, gen surg and ID consults placed. Switched to IV abx - vanco and unasyn. D dimer and CRP elevated, BC pending. On lovenox. Tolerating mod carb diet, blood sugar checks ACHS. No issues voiding, no BM, passing flatus. PIV x1 - Sl/patent w/ intermittent abx. Moves in bed well independently, assist x1-2 when oob for pivots. Discharge pending, SW consult placed.

## 2021-12-22 ENCOUNTER — ANESTHESIA (OUTPATIENT)
Dept: SURGERY | Facility: CLINIC | Age: 51
End: 2021-12-22
Payer: COMMERCIAL

## 2021-12-22 ENCOUNTER — ANESTHESIA EVENT (OUTPATIENT)
Dept: SURGERY | Facility: CLINIC | Age: 51
End: 2021-12-22
Payer: COMMERCIAL

## 2021-12-22 LAB
ANION GAP SERPL CALCULATED.3IONS-SCNC: 8 MMOL/L (ref 3–14)
BUN SERPL-MCNC: 15 MG/DL (ref 7–30)
CALCIUM SERPL-MCNC: 8.2 MG/DL (ref 8.5–10.1)
CHLORIDE BLD-SCNC: 100 MMOL/L (ref 94–109)
CO2 SERPL-SCNC: 25 MMOL/L (ref 20–32)
CREAT SERPL-MCNC: 1.03 MG/DL (ref 0.66–1.25)
ERYTHROCYTE [DISTWIDTH] IN BLOOD BY AUTOMATED COUNT: 12.1 % (ref 10–15)
GFR SERPL CREATININE-BSD FRML MDRD: 87 ML/MIN/1.73M2
GLUCOSE BLD-MCNC: 177 MG/DL (ref 70–99)
GLUCOSE BLDC GLUCOMTR-MCNC: 167 MG/DL (ref 70–99)
GLUCOSE BLDC GLUCOMTR-MCNC: 178 MG/DL (ref 70–99)
GLUCOSE BLDC GLUCOMTR-MCNC: 217 MG/DL (ref 70–99)
HCT VFR BLD AUTO: 37 % (ref 40–53)
HGB BLD-MCNC: 12.1 G/DL (ref 13.3–17.7)
LACTATE SERPL-SCNC: 0.8 MMOL/L (ref 0.7–2)
MCH RBC QN AUTO: 27.6 PG (ref 26.5–33)
MCHC RBC AUTO-ENTMCNC: 32.7 G/DL (ref 31.5–36.5)
MCV RBC AUTO: 84 FL (ref 78–100)
PLATELET # BLD AUTO: 282 10E3/UL (ref 150–450)
POTASSIUM BLD-SCNC: 3.9 MMOL/L (ref 3.4–5.3)
RBC # BLD AUTO: 4.39 10E6/UL (ref 4.4–5.9)
SODIUM SERPL-SCNC: 133 MMOL/L (ref 133–144)
WBC # BLD AUTO: 5.9 10E3/UL (ref 4–11)

## 2021-12-22 PROCEDURE — 99232 SBSQ HOSP IP/OBS MODERATE 35: CPT | Performed by: PHYSICIAN ASSISTANT

## 2021-12-22 PROCEDURE — L3260 AMBULATORY SURGICAL BOOT EAC: HCPCS

## 2021-12-22 PROCEDURE — 85027 COMPLETE CBC AUTOMATED: CPT | Performed by: PHYSICIAN ASSISTANT

## 2021-12-22 PROCEDURE — 250N000013 HC RX MED GY IP 250 OP 250 PS 637: Performed by: PODIATRIST

## 2021-12-22 PROCEDURE — 250N000009 HC RX 250: Performed by: NURSE ANESTHETIST, CERTIFIED REGISTERED

## 2021-12-22 PROCEDURE — 250N000011 HC RX IP 250 OP 636: Performed by: INTERNAL MEDICINE

## 2021-12-22 PROCEDURE — 250N000013 HC RX MED GY IP 250 OP 250 PS 637: Performed by: HOSPITALIST

## 2021-12-22 PROCEDURE — 272N000001 HC OR GENERAL SUPPLY STERILE: Performed by: PODIATRIST

## 2021-12-22 PROCEDURE — 36415 COLL VENOUS BLD VENIPUNCTURE: CPT | Performed by: PHYSICIAN ASSISTANT

## 2021-12-22 PROCEDURE — 120N000004 HC R&B MS OVERFLOW

## 2021-12-22 PROCEDURE — 258N000003 HC RX IP 258 OP 636: Performed by: INTERNAL MEDICINE

## 2021-12-22 PROCEDURE — 360N000078 HC SURGERY LEVEL 5, PER MIN: Performed by: PODIATRIST

## 2021-12-22 PROCEDURE — 370N000017 HC ANESTHESIA TECHNICAL FEE, PER MIN: Performed by: PODIATRIST

## 2021-12-22 PROCEDURE — 250N000011 HC RX IP 250 OP 636: Performed by: PODIATRIST

## 2021-12-22 PROCEDURE — 82310 ASSAY OF CALCIUM: CPT | Performed by: PHYSICIAN ASSISTANT

## 2021-12-22 PROCEDURE — 87075 CULTR BACTERIA EXCEPT BLOOD: CPT | Performed by: PODIATRIST

## 2021-12-22 PROCEDURE — 250N000011 HC RX IP 250 OP 636: Performed by: NURSE ANESTHETIST, CERTIFIED REGISTERED

## 2021-12-22 PROCEDURE — 83605 ASSAY OF LACTIC ACID: CPT | Performed by: INTERNAL MEDICINE

## 2021-12-22 PROCEDURE — 11043 DBRDMT MUSC&/FSCA 1ST 20/<: CPT | Performed by: PODIATRIST

## 2021-12-22 PROCEDURE — 250N000009 HC RX 250: Performed by: PODIATRIST

## 2021-12-22 PROCEDURE — 258N000003 HC RX IP 258 OP 636: Performed by: PODIATRIST

## 2021-12-22 PROCEDURE — 0KDV0ZZ EXTRACTION OF RIGHT FOOT MUSCLE, OPEN APPROACH: ICD-10-PCS | Performed by: PODIATRIST

## 2021-12-22 PROCEDURE — 87070 CULTURE OTHR SPECIMN AEROBIC: CPT | Performed by: PODIATRIST

## 2021-12-22 RX ORDER — FENTANYL CITRATE 50 UG/ML
INJECTION, SOLUTION INTRAMUSCULAR; INTRAVENOUS PRN
Status: DISCONTINUED | OUTPATIENT
Start: 2021-12-22 | End: 2021-12-22

## 2021-12-22 RX ORDER — ACETAMINOPHEN 325 MG/1
975 TABLET ORAL EVERY 8 HOURS
Status: DISPENSED | OUTPATIENT
Start: 2021-12-22 | End: 2021-12-25

## 2021-12-22 RX ORDER — PROCHLORPERAZINE MALEATE 10 MG
10 TABLET ORAL EVERY 6 HOURS PRN
Status: DISCONTINUED | OUTPATIENT
Start: 2021-12-22 | End: 2022-01-02 | Stop reason: HOSPADM

## 2021-12-22 RX ORDER — POLYETHYLENE GLYCOL 3350 17 G/17G
17 POWDER, FOR SOLUTION ORAL DAILY
Status: DISCONTINUED | OUTPATIENT
Start: 2021-12-23 | End: 2022-01-02 | Stop reason: HOSPADM

## 2021-12-22 RX ORDER — LIDOCAINE HYDROCHLORIDE 20 MG/ML
INJECTION, SOLUTION INFILTRATION; PERINEURAL PRN
Status: DISCONTINUED | OUTPATIENT
Start: 2021-12-22 | End: 2021-12-22

## 2021-12-22 RX ORDER — ONDANSETRON 2 MG/ML
INJECTION INTRAMUSCULAR; INTRAVENOUS PRN
Status: DISCONTINUED | OUTPATIENT
Start: 2021-12-22 | End: 2021-12-22

## 2021-12-22 RX ORDER — KETAMINE HYDROCHLORIDE 10 MG/ML
INJECTION INTRAMUSCULAR; INTRAVENOUS PRN
Status: DISCONTINUED | OUTPATIENT
Start: 2021-12-22 | End: 2021-12-22

## 2021-12-22 RX ORDER — ONDANSETRON 4 MG/1
4 TABLET, ORALLY DISINTEGRATING ORAL EVERY 6 HOURS PRN
Status: DISCONTINUED | OUTPATIENT
Start: 2021-12-22 | End: 2022-01-02 | Stop reason: HOSPADM

## 2021-12-22 RX ORDER — BISACODYL 10 MG
10 SUPPOSITORY, RECTAL RECTAL DAILY PRN
Status: DISCONTINUED | OUTPATIENT
Start: 2021-12-22 | End: 2022-01-02 | Stop reason: HOSPADM

## 2021-12-22 RX ORDER — BUPIVACAINE HYDROCHLORIDE 5 MG/ML
INJECTION, SOLUTION EPIDURAL; INTRACAUDAL PRN
Status: DISCONTINUED | OUTPATIENT
Start: 2021-12-22 | End: 2021-12-22 | Stop reason: HOSPADM

## 2021-12-22 RX ORDER — LIDOCAINE 40 MG/G
CREAM TOPICAL
Status: DISCONTINUED | OUTPATIENT
Start: 2021-12-22 | End: 2022-01-02 | Stop reason: HOSPADM

## 2021-12-22 RX ORDER — MAGNESIUM HYDROXIDE 1200 MG/15ML
LIQUID ORAL PRN
Status: DISCONTINUED | OUTPATIENT
Start: 2021-12-22 | End: 2021-12-22 | Stop reason: HOSPADM

## 2021-12-22 RX ORDER — ONDANSETRON 2 MG/ML
4 INJECTION INTRAMUSCULAR; INTRAVENOUS EVERY 6 HOURS PRN
Status: DISCONTINUED | OUTPATIENT
Start: 2021-12-22 | End: 2022-01-02 | Stop reason: HOSPADM

## 2021-12-22 RX ORDER — AMOXICILLIN 250 MG
1 CAPSULE ORAL 2 TIMES DAILY
Status: DISCONTINUED | OUTPATIENT
Start: 2021-12-22 | End: 2022-01-02 | Stop reason: HOSPADM

## 2021-12-22 RX ORDER — SODIUM CHLORIDE, SODIUM LACTATE, POTASSIUM CHLORIDE, CALCIUM CHLORIDE 600; 310; 30; 20 MG/100ML; MG/100ML; MG/100ML; MG/100ML
INJECTION, SOLUTION INTRAVENOUS CONTINUOUS
Status: DISCONTINUED | OUTPATIENT
Start: 2021-12-22 | End: 2021-12-25

## 2021-12-22 RX ORDER — SODIUM CHLORIDE, SODIUM LACTATE, POTASSIUM CHLORIDE, CALCIUM CHLORIDE 600; 310; 30; 20 MG/100ML; MG/100ML; MG/100ML; MG/100ML
INJECTION, SOLUTION INTRAVENOUS CONTINUOUS PRN
Status: DISCONTINUED | OUTPATIENT
Start: 2021-12-22 | End: 2021-12-22

## 2021-12-22 RX ORDER — ACETAMINOPHEN 325 MG/1
650 TABLET ORAL EVERY 4 HOURS PRN
Status: DISCONTINUED | OUTPATIENT
Start: 2021-12-25 | End: 2022-01-02 | Stop reason: HOSPADM

## 2021-12-22 RX ORDER — PROPOFOL 10 MG/ML
INJECTION, EMULSION INTRAVENOUS CONTINUOUS PRN
Status: DISCONTINUED | OUTPATIENT
Start: 2021-12-22 | End: 2021-12-22

## 2021-12-22 RX ADMIN — INSULIN ASPART 4 UNITS: 100 INJECTION, SOLUTION INTRAVENOUS; SUBCUTANEOUS at 17:36

## 2021-12-22 RX ADMIN — MELATONIN 5 MG TABLET 5 MG: at 21:15

## 2021-12-22 RX ADMIN — INSULIN GLARGINE 20 UNITS: 100 INJECTION, SOLUTION SUBCUTANEOUS at 08:57

## 2021-12-22 RX ADMIN — LIDOCAINE HYDROCHLORIDE 40 MG: 20 INJECTION, SOLUTION INFILTRATION; PERINEURAL at 12:46

## 2021-12-22 RX ADMIN — INSULIN ASPART 2 UNITS: 100 INJECTION, SOLUTION INTRAVENOUS; SUBCUTANEOUS at 08:55

## 2021-12-22 RX ADMIN — PROPOFOL 100 MCG/KG/MIN: 10 INJECTION, EMULSION INTRAVENOUS at 12:45

## 2021-12-22 RX ADMIN — AMPICILLIN SODIUM AND SULBACTAM SODIUM 3 G: 2; 1 INJECTION, POWDER, FOR SOLUTION INTRAMUSCULAR; INTRAVENOUS at 08:43

## 2021-12-22 RX ADMIN — AMPICILLIN SODIUM AND SULBACTAM SODIUM 3 G: 2; 1 INJECTION, POWDER, FOR SOLUTION INTRAMUSCULAR; INTRAVENOUS at 20:39

## 2021-12-22 RX ADMIN — FENTANYL CITRATE 100 MCG: 50 INJECTION, SOLUTION INTRAMUSCULAR; INTRAVENOUS at 12:43

## 2021-12-22 RX ADMIN — OXYCODONE HYDROCHLORIDE 5 MG: 5 TABLET ORAL at 17:33

## 2021-12-22 RX ADMIN — Medication 30 MG: at 12:45

## 2021-12-22 RX ADMIN — ONDANSETRON 4 MG: 2 INJECTION INTRAMUSCULAR; INTRAVENOUS at 12:44

## 2021-12-22 RX ADMIN — IBUPROFEN 400 MG: 400 TABLET ORAL at 17:33

## 2021-12-22 RX ADMIN — AMPICILLIN SODIUM AND SULBACTAM SODIUM 3 G: 2; 1 INJECTION, POWDER, FOR SOLUTION INTRAMUSCULAR; INTRAVENOUS at 14:22

## 2021-12-22 RX ADMIN — VANCOMYCIN HYDROCHLORIDE 1250 MG: 5 INJECTION, POWDER, LYOPHILIZED, FOR SOLUTION INTRAVENOUS at 15:26

## 2021-12-22 RX ADMIN — MIDAZOLAM 2 MG: 1 INJECTION INTRAMUSCULAR; INTRAVENOUS at 12:43

## 2021-12-22 RX ADMIN — VANCOMYCIN HYDROCHLORIDE 1250 MG: 5 INJECTION, POWDER, LYOPHILIZED, FOR SOLUTION INTRAVENOUS at 02:59

## 2021-12-22 RX ADMIN — ACETAMINOPHEN 975 MG: 325 TABLET, FILM COATED ORAL at 21:11

## 2021-12-22 RX ADMIN — INSULIN ASPART 2 UNITS: 100 INJECTION, SOLUTION INTRAVENOUS; SUBCUTANEOUS at 14:25

## 2021-12-22 RX ADMIN — AMPICILLIN SODIUM AND SULBACTAM SODIUM 3 G: 2; 1 INJECTION, POWDER, FOR SOLUTION INTRAMUSCULAR; INTRAVENOUS at 02:32

## 2021-12-22 RX ADMIN — ACETAMINOPHEN 975 MG: 325 TABLET, FILM COATED ORAL at 14:21

## 2021-12-22 RX ADMIN — GUAIFENESIN 10 ML: 200 SOLUTION ORAL at 08:43

## 2021-12-22 ASSESSMENT — ENCOUNTER SYMPTOMS
ABDOMINAL PAIN: 0
SHORTNESS OF BREATH: 0
COUGH: 0
FEVER: 0
NAUSEA: 0
FATIGUE: 1
VOMITING: 0
DYSRHYTHMIAS: 0
SEIZURES: 0
WOUND: 1

## 2021-12-22 ASSESSMENT — ACTIVITIES OF DAILY LIVING (ADL)
ADLS_ACUITY_SCORE: 6
ADLS_ACUITY_SCORE: 16
ADLS_ACUITY_SCORE: 6
ADLS_ACUITY_SCORE: 6
ADLS_ACUITY_SCORE: 15
ADLS_ACUITY_SCORE: 6
ADLS_ACUITY_SCORE: 16
ADLS_ACUITY_SCORE: 6
ADLS_ACUITY_SCORE: 16
ADLS_ACUITY_SCORE: 16
ADLS_ACUITY_SCORE: 6
ADLS_ACUITY_SCORE: 6
ADLS_ACUITY_SCORE: 15
ADLS_ACUITY_SCORE: 6
ADLS_ACUITY_SCORE: 16
ADLS_ACUITY_SCORE: 16
ADLS_ACUITY_SCORE: 9
ADLS_ACUITY_SCORE: 16
ADLS_ACUITY_SCORE: 6
ADLS_ACUITY_SCORE: 16

## 2021-12-22 ASSESSMENT — LIFESTYLE VARIABLES: TOBACCO_USE: 1

## 2021-12-22 NOTE — ANESTHESIA POSTPROCEDURE EVALUATION
Patient: Chris Shea    Procedure: Procedure(s):  IRRIGATION AND DEBRIDEMENT, RIGHT FOOT       Diagnosis:Cellulitis [L03.90]  Diagnosis Additional Information: No value filed.    Anesthesia Type:  MAC    Note:  Disposition: Inpatient   Postop Pain Control: Uneventful            Sign Out: Well controlled pain   PONV: No   Neuro/Psych: Uneventful            Sign Out: Acceptable/Baseline neuro status   Airway/Respiratory: Uneventful            Sign Out: Acceptable/Baseline resp. status   CV/Hemodynamics: Uneventful            Sign Out: Acceptable CV status; No obvious hypovolemia; No obvious fluid overload   Other NRE: NONE   DID A NON-ROUTINE EVENT OCCUR? No           Last vitals:  Vitals Value Taken Time   /70 12/22/21 1350   Temp 36.4  C (97.6  F) 12/22/21 1350   Pulse 80 12/22/21 1350   Resp 16 12/22/21 1350   SpO2 96 % 12/22/21 1350       Electronically Signed By: Kasie Sanders MD  December 22, 2021  2:08 PM

## 2021-12-22 NOTE — OP NOTE
PREOPERATIVE DIAGNOSES:     1.  Right foot ulcer with abscess      POSTOPERATIVE DIAGNOSES:   1.  Right foot ulcer with abscess       PROCEDURE:     1. Right foot ulcer debridement to level and including muscle tendon for two ulcers, measuring 3 x 1 x .5 cm and 1 x 1 x .5 cm      ANESTHESIA:  MAC with local.       HEMOSTASIS:  Electrocautery.       ESTIMATED BLOOD LOSS:  10 mL.       SPECIMENS:  Right foot ulcer culture      MATERIALS:  Quarter-inch plain packing.     Indications: Chris Shea  has an ulcer  To the submet 1 site of his right foot. Site was debrided at bedside and some purulence was expressed. He had an MRI completed that showed fluid tracking along the FHL tendon and possiby tracking to the medial cuneiform and navicular. There was also concern for osteomyelitis to the navicular. Given this, an I&D will be done with possible bone biopsy to his navicular bone.Procedure was discussed with patient at length, including all risks and benefits. Patient agrees to planned procedure.     Procedure: Under mild sedation pt was brought into the OR & placed on the operating table in a supine position. Attention was drawn to the right foot  where an ulcer that measures 1 cm in diameter is present. 10 cc of .5% marcaine were injected into the foot. The foot was scrubbed, prepped and draped in an aseptic manner.      Following this, the previous incision at the submet 2 region was extending proximally, along the course of the erythema. This incision was carried down to fascia. There as trace purulence expressed at initial incision. The incision was carried proximally. No noted purulence was encountered. It was extending along the course of the midfoot where mild erythema was present. No purulence or devitalized tissue was found here.     Following this, the sub met 1 ulcer was sharply debrided. There was noted 2 ml of purulence here. The ulcer was debrided to the level of the FHL tendon. Site was evaluated and  "no purulence was found. Tissue as granular. A culture was taken of this site.      Next copious amounts of saline were used to flush the amputation site. The plantar incision was then closed with 3-0 vicryl and 3.0 prolene suture. The submet one site was packed with 1/4\" iodoform packing.  Upon completion of suturing, a non-adhesive sterile dressing was then placed over the surgical site followed by 4 x 4s, kerlix, & an ACE wrap.     The pt tolerated the procedure & anesthesia well.  He was transferred to the recovery room with vital signs stable and vascular status intact to the right foot.  Following a period of post-op monitoring the pt will return to his hospital bed with the following written and oral post-op instructions:    Keep dressing clean, dry and intact.  Do not remove dressing.  WBAT to heel to right foot sx shoe  Elevate right foot at rest.  Continue IV antibiotics  Contact Dr. Neely if any post-op questions or arrise.     Intraop findings: Less than expected infection. Small amounts of purulence encountered. Did not extend proximally or to bone.     Post op plan: Podiatry to follow up on POD#1 for dressing change. Anticipate no further plans per podiatry and abx could be transitioned to oral course per intraop cultures.         "

## 2021-12-22 NOTE — ANESTHESIA CARE TRANSFER NOTE
Patient: Chris Shea    Procedure: Procedure(s):  IRRIGATION AND DEBRIDEMENT, RIGHT FOOT       Diagnosis: Cellulitis [L03.90]  Diagnosis Additional Information: No value filed.    Anesthesia Type:   MAC     Note:    Oropharynx: oropharynx clear of all foreign objects  Level of Consciousness: awake  Oxygen Supplementation: room air    Independent Airway: airway patency satisfactory and stable  Dentition: dentition unchanged  Vital Signs Stable: post-procedure vital signs reviewed and stable  Report to RN Given: handoff report given  Patient transferred to: PACU    Handoff Report: Identifed the Patient, Identified the Reponsible Provider, Reviewed the pertinent medical history, Discussed the surgical course, Reviewed Intra-OP anesthesia mangement and issues during anesthesia, Set expectations for post-procedure period and Allowed opportunity for questions and acknowledgement of understanding      Vitals:  Vitals Value Taken Time   /70 12/22/21 1350   Temp 36.4  C (97.6  F) 12/22/21 1350   Pulse 80 12/22/21 1350   Resp 16 12/22/21 1350   SpO2 96 % 12/22/21 1350       Electronically Signed By: TALA Blankenship CRNA  December 22, 2021  1:53 PM

## 2021-12-22 NOTE — PLAN OF CARE
A&Ox4, frustrated at times.  VSS, on RA.  Denies pain.  Up w/ asst 1, not out of bed this shift.  WBAT to R foot.  R foot dressing CDI, refused assessment.  Covid/Contact precautions maintained.  Refused 0200 BG check and 0400 VS.  Infrequent dry cough, LS diminished.  Intermit Abx, Unasyn and Vanco.  Pt agrees to I&D today, NPO since midnight.  Discharge pending.

## 2021-12-22 NOTE — BRIEF OP NOTE
[facility]    Brief Operative Note    Pre-operative diagnosis: Right foot abscess    Post-operative diagnosis Right foot abscess     Procedure: Excisional debridement to level of and including muscle tendon right foot for ulcers measuring 3 cm x 1 cm x .5 cm and 1 x 1 x .5 cm  Surgeon: Surgeon(s) and Role:     * Michelle Neely DPM - Primary  Anesthesia: General   Estimated Blood Loss: Less than 10 ml    Drains: None  Specimens:   ID Type Source Tests Collected by Time Destination   A : RIGHT FOOT WOUND Wound Foot, Right ANAEROBIC BACTERIAL CULTURE ROUTINE, GRAM STAIN, AEROBIC BACTERIAL CULTURE ROUTINE Michelle Neely DPM 12/22/2021 12:58 PM      Findings:   Much less infection than expected, compared to MRI findings. Approximately 1 ml of purulence expressed from each site. No tracking proximally. No convergence with midfoot bones.   Complications: None  Implants: * No implants in log *

## 2021-12-22 NOTE — ANESTHESIA PREPROCEDURE EVALUATION
Anesthesia Pre-Procedure Evaluation    Patient: Chris Shea   MRN: [id number] : 1970        Preoperative Diagnosis: Cellulitis [L03.90]    Procedure : Procedure(s):  IRRIGATION AND DEBRIDEMENT, RIGHT FOOT  BIOPSY, BONE, RIGHT FOOT          Past Medical History:   Diagnosis Date     Methamphetamine abuse (H)      Right foot ulcer (H)      Type 2 diabetes mellitus with diabetic peripheral angiopathy without gangrene, without long-term current use of insulin (H)       Past Surgical History:   Procedure Laterality Date     INCISION AND DRAINAGE NECK, COMBINED N/A 2021    Procedure: INCISION AND DRAINAGE, POSTERIOR NECK;  Surgeon: Lucina Dodson MD;  Location: [facility] OR      No Known Allergies   Social History     Tobacco Use     Smoking status: Never Smoker     Smokeless tobacco: Not on file   Substance Use Topics     Alcohol use: No      Wt Readings from Last 1 Encounters:   21 108.9 kg (240 lb)        Anesthesia Evaluation   Pt has had prior anesthetic. Type: General.    No history of anesthetic complications       ROS/MED HX  ENT/Pulmonary: Comment: Covid 19 +, asymptomatic    (+) tobacco use,  (-) asthma and sleep apnea   Neurologic:     (+) peripheral neuropathy,  (-) no seizures, no CVA and migraines   Cardiovascular:  - neg cardiovascular ROS  (-) hypertension, CAD, MARIN, arrhythmias, valvular problems/murmurs and dyslipidemia   METS/Exercise Tolerance: >4 METS    Hematologic:     (+) anemia,  (-) history of blood clots   Musculoskeletal: Comment: Chronic foot ulcers   (-) arthritis   GI/Hepatic:  - neg GI/hepatic ROS  (-) GERD and liver disease   Renal/Genitourinary:    (-) renal disease and nephrolithiasis   Endo: Comment: Untreated diabetes    (+) type I DM, Last HgA1c: 13.3,  (-) Type II DM, thyroid disease and obesity   Psychiatric/Substance Use:     (+) psychiatric history Recreational drug usage: Meth.    Infectious Disease:    (-) Recent Fever   Malignancy:       Other: Comment:  Homeless            Physical Exam    Airway        Mallampati: II   TM distance: > 3 FB   Neck ROM: full   Mouth opening: > 3 cm    Respiratory Devices and Support         Dental  no notable dental history         Cardiovascular   cardiovascular exam normal       Rhythm and rate: normal     Pulmonary   pulmonary exam normal        breath sounds clear to auscultation           OUTSIDE LABS:  CBC:   Lab Results   Component Value Date    WBC 5.9 12/22/2021    WBC 5.9 12/21/2021    HGB 12.1 (L) 12/22/2021    HGB 12.1 (L) 12/21/2021    HCT 37.0 (L) 12/22/2021    HCT 36.8 (L) 12/21/2021     12/22/2021     12/21/2021     BMP:   Lab Results   Component Value Date     12/22/2021     (L) 12/21/2021    POTASSIUM 3.9 12/22/2021    POTASSIUM 4.2 12/21/2021    CHLORIDE 100 12/22/2021    CHLORIDE 98 12/21/2021    CO2 25 12/22/2021    CO2 26 12/21/2021    BUN 15 12/22/2021    BUN 20 12/21/2021    CR 1.03 12/22/2021    CR 1.25 12/21/2021     (H) 12/22/2021     (H) 12/22/2021     COAGS: No results found for: PTT, INR, FIBR  POC: No results found for: BGM, HCG, HCGS  HEPATIC:   Lab Results   Component Value Date    ALBUMIN 2.5 (L) 12/20/2021    PROTTOTAL 8.5 12/20/2021    ALT 19 12/20/2021    AST 22 12/20/2021    ALKPHOS 97 12/20/2021    BILITOTAL 0.5 12/20/2021     OTHER:   Lab Results   Component Value Date    PH 7.37 09/12/2021    LACT 0.8 12/22/2021    A1C 9.7 (H) 12/20/2021    ELINOR 8.2 (L) 12/22/2021    MAG 1.8 09/22/2021    .0 (H) 12/20/2021       Anesthesia Plan    ASA Status:  3      Anesthesia Type: MAC.     - Reason for MAC: straight local not clinically adequate              Consents    Anesthesia Plan(s) and associated risks, benefits, and realistic alternatives discussed. Questions answered and patient/representative(s) expressed understanding.    - Discussed:     - Discussed with:  Patient         Postoperative Care            Comments:                Dorian Bran,  MD

## 2021-12-22 NOTE — PROVIDER NOTIFICATION
.MD Notification    Notified Person: MD    Notified Person Name: Dinorah    Notification Date/Time: 12/21/21 at 8:55 pm    Notification Interaction: text page    Purpose of Notification:   efra Casarez about my last page. The message was suppose to be about [id number]. This pt requested medication for sleep.      Orders Received:    Comments:

## 2021-12-22 NOTE — PROGRESS NOTES
S:  We received an order for a DH2 shoe for the R foot.  O:  I removed the offloading pegs from under the wounds on the R foot.    A:  The size XL fits adequate and will offload the wounds when weight bearing.  P:  The patient will wear the shoe when weight bearing.  G:  Provide DH2 shoe, offload wounds.  Silvestre NOWAK

## 2021-12-22 NOTE — PROGRESS NOTES
[facility]    Hospitalist Progress Note    Date of Service (when I saw the patient): 12/22/2021    Assessment & Plan   Chris Shea is a 51 year old male with homelessness, uncontrolled diabetes with peripheral vascular disease, neuropathy, nephropathy, CKD stage II, chronic bilateral foot wounds, methamphetamine abuse, h/o tobacco abuse who was admitted to observation on 12/19/2021 for podiatry assessment and social work assistance.  Incidental Covid-19+ testing in an unvaccinated patient.     Right foot cellulitis with tenosynovitis and possible osteomyelitis  Chronic right mid foot ulcer  Afebrile, normal WBC, no hypotension. Lactate 1.0. Follows with [facility] wound clinic but has not received regular cares. Underwent excision and debridement of this foot ulcer 12/20/21.   MRI foot concerning for tenosynovitis of flexor digitorum longus with possible early osteo vs stress reaction as well.   CRP is 216, procal 0.19.   --Podiatry consulted, plan for I&D and possible bone biopsy. Initially scheduled for 12/21 however patient refused as he has significant concerns about where he will receive cares following surgery given homelessness.  Previous rounder discussed in detail with patient on 12/21 about risks of delaying/refusing surgery including worsening infection, potential need for more aggressive surgical intervention, sepsis. Patient ultimately agreeable to proceed 12/22.   --on Unasyn and vancomycin per ID  --ID following, appreciate assistance   --will need PT following surgery and likely TCU  --SW following      COVID-19 infection in unvaccinated patient  Incidental positive findings on screening lab. Denies dyspnea but reports occasional cough and chest pain worse with deep breathing.   He has remained afebrile and has oxygen saturation stable on room air.   CXR on admission is negative.   CRP is 216 (difficult to interpret given above infection) and D dimer 2.24.    --Surveillance COVID-19 PCR test result => POSITIVE  --Covid precautions  --No indication for treatment with steroids nor remdesivir as he does not meet criteria at present  --CT chest obtained to rule out PE given reports of chest pain today and significant d dimer elevation.  No evidence of pulmonary embolism.  Pulmonary findings typical of COVID-19 pneumonia.      Poorly controlled diabetes  Neuropathy, Peripheral Vascular Disease and Nephropathy secondary to diabetes  Pseudohyponatremia  CKD stage II  Prior admission was discharged with diabetes supplies and lantus with premeal aspart. He is not taking these.  A1c 9.7 this admission.   --consistent carb diet  --continue lantus 20 units in AM and reassess response  --corrective dose aspart     HERNAN.   Cr on admission elevated at 1.3 from baseline 1-1.2. suspect hypovolemia.   --repeat BMP ordered, patient has been refusing  --may need some IVF but cautious use with COVID infection     Hyponatremia  Na 127 on admission. Suspect hypovolemia.   -- repeat labs showing 131--133     Cold exposure left foot  Cold left foot attributed wet boot and cold exposure in homeless patient. Lower extremity doppler with no evidence for high grade stenosis or occlusion of the left lower extremity arterial system. There were triphasic waveforms throughout the left lower extremity arterial system.   -no further evaluation needed     Methamphetamine Abuse  Noted. UDS positive this admission.      DVT Prophylaxis: Enoxaparin (Lovenox) subcutaneous- held in anticipation of surgery.   Code Status: Full Code     Disposition: Expected discharge in 2+ days pending post op course. Anticipate he will need TCU     Patient was discussed with Dr. Westfall who agrees with the above plan     Joseph Puckett    Interval History   Patient doing well.  Lying in bed on my arrival.  Denies any fever, chills, chest pain, nausea or vomiting.  He has mild shortness of breath and intermittent cough.   Vital signs are stable.  Reports pain to the right foot.  Anticipating surgery for I&D later today.  He is agreeable.    -Data reviewed today: I reviewed all new labs and imaging results over the last 24 hours. I personally reviewed no images or EKG's today.    Physical Exam   Temp: 98.6  F (37  C) Temp src: Oral BP: 123/76 Pulse: 90   Resp: 16 SpO2: 93 % O2 Device: None (Room air)    Vitals:    12/19/21 2302   Weight: 108.9 kg (240 lb)     Vital Signs with Ranges  Temp:  [98.6  F (37  C)-100.5  F (38.1  C)] 98.6  F (37  C)  Pulse:  [] 90  Resp:  [16-18] 16  BP: (109-153)/(61-91) 123/76  SpO2:  [91 %-96 %] 93 %  I/O last 3 completed shifts:  In: -   Out: 975 [Urine:975]    Constitutional: Alert, oriented to person, place, situation.  Cooperative, lying in bed in NAD.   Respiratory: No labored breathing, speaking in full sentences, auscultation deferred given COVID-19 status.  GI:  Abdomen soft, nontender and nondistended   Skin/Integumen:  Warm, dry, non-diaphoretic.  Right lower extremity recently dressed/wrapped.  MSK: CMS x4 intact.  Neuro:  Speech is clear. Face symmetric. Follows commands    Medications       ampicillin-sulbactam (UNASYN) IV  3 g Intravenous Q6H     bacitracin         bupivacaine (PF)         diclofenac  4 g Topical 4x Daily     [Held by provider] enoxaparin ANTICOAGULANT  40 mg Subcutaneous Q24H     insulin aspart  1-10 Units Subcutaneous TID AC     insulin aspart  1-7 Units Subcutaneous At Bedtime     insulin glargine  20 Units Subcutaneous QAM AC     lidocaine (PF)         sodium chloride (PF)  3 mL Intracatheter Q8H     vancomycin  1,250 mg Intravenous Q12H       Data   Recent Labs   Lab 12/21/21  1522 12/21/21  1214 12/21/21  0833 12/20/21  0857 12/20/21  0017   WBC 5.9  --   --   --  6.4   HGB 12.1*  --   --   --  13.8   MCV 85  --   --   --  83     --   --   --  258   *  --   --   --  127*   POTASSIUM 4.2  --   --   --  4.2   CHLORIDE 98  --   --   --  90*   CO2 26  --    --   --  32   BUN 20  --   --   --  25   CR 1.25  --   --   --  1.32*   ANIONGAP 7  --   --   --  5   ELINOR 8.2*  --   --   --  8.8   * 160* 148*   < > 266*   ALBUMIN  --   --   --   --  2.5*   PROTTOTAL  --   --   --   --  8.5   BILITOTAL  --   --   --   --  0.5   ALKPHOS  --   --   --   --  97   ALT  --   --   --   --  19   AST  --   --   --   --  22    < > = values in this interval not displayed.       Recent Results (from the past 24 hour(s))   CT Chest Pulmonary Embolism w Contrast    Narrative    EXAM: CT CHEST PULMONARY EMBOLISM W CONTRAST  LOCATION: [facility]  DATE/TIME: 12/21/2021 6:09 PM    INDICATION: Shortness of breath.    COMPARISON: None.    TECHNIQUE: CT chest pulmonary angiogram during arterial phase injection of IV contrast. Multiplanar reformats and MIP reconstructions were performed. Dose reduction techniques were used.     CONTRAST: 79 mL Isovue-370.    FINDINGS:  ANGIOGRAM CHEST: Pulmonary arteries are normal caliber and negative for pulmonary emboli. Thoracic aorta is negative for dissection. No CT evidence of right heart strain.    LUNGS AND PLEURA: Peripheral geographic and ground-glass regions of nodularity in the lungs with regions of intralobular thickening typical of COVID-19 pneumonia.    MEDIASTINUM/AXILLAE: Normal.    CORONARY ARTERY CALCIFICATION: Mild.    UPPER ABDOMEN: Normal.    MUSCULOSKELETAL: Normal.      Impression    IMPRESSION:  1.  No pulmonary embolism.  2.  Pulmonary findings typical of COVID-19 pneumonia.

## 2021-12-23 LAB
GLUCOSE BLDC GLUCOMTR-MCNC: 124 MG/DL (ref 70–99)
GLUCOSE BLDC GLUCOMTR-MCNC: 138 MG/DL (ref 70–99)
GLUCOSE BLDC GLUCOMTR-MCNC: 140 MG/DL (ref 70–99)
VANCOMYCIN SERPL-MCNC: 11.5 MG/L
VANCOMYCIN SERPL-MCNC: 12.3 MG/L

## 2021-12-23 PROCEDURE — 250N000013 HC RX MED GY IP 250 OP 250 PS 637: Performed by: PODIATRIST

## 2021-12-23 PROCEDURE — 80202 ASSAY OF VANCOMYCIN: CPT | Performed by: HOSPITALIST

## 2021-12-23 PROCEDURE — 258N000003 HC RX IP 258 OP 636: Performed by: PODIATRIST

## 2021-12-23 PROCEDURE — 99232 SBSQ HOSP IP/OBS MODERATE 35: CPT | Performed by: PHYSICIAN ASSISTANT

## 2021-12-23 PROCEDURE — 250N000011 HC RX IP 250 OP 636: Performed by: INTERNAL MEDICINE

## 2021-12-23 PROCEDURE — 80202 ASSAY OF VANCOMYCIN: CPT | Performed by: PODIATRIST

## 2021-12-23 PROCEDURE — 120N000004 HC R&B MS OVERFLOW

## 2021-12-23 PROCEDURE — 258N000003 HC RX IP 258 OP 636: Performed by: INTERNAL MEDICINE

## 2021-12-23 PROCEDURE — 250N000011 HC RX IP 250 OP 636: Performed by: PODIATRIST

## 2021-12-23 PROCEDURE — 36415 COLL VENOUS BLD VENIPUNCTURE: CPT | Performed by: HOSPITALIST

## 2021-12-23 PROCEDURE — 36415 COLL VENOUS BLD VENIPUNCTURE: CPT | Performed by: PODIATRIST

## 2021-12-23 RX ADMIN — SENNOSIDES AND DOCUSATE SODIUM 1 TABLET: 50; 8.6 TABLET ORAL at 09:19

## 2021-12-23 RX ADMIN — INSULIN ASPART 1 UNITS: 100 INJECTION, SOLUTION INTRAVENOUS; SUBCUTANEOUS at 09:20

## 2021-12-23 RX ADMIN — ACETAMINOPHEN 975 MG: 325 TABLET, FILM COATED ORAL at 21:31

## 2021-12-23 RX ADMIN — AMPICILLIN SODIUM AND SULBACTAM SODIUM 3 G: 2; 1 INJECTION, POWDER, FOR SOLUTION INTRAMUSCULAR; INTRAVENOUS at 09:19

## 2021-12-23 RX ADMIN — IBUPROFEN 400 MG: 400 TABLET ORAL at 17:11

## 2021-12-23 RX ADMIN — ACETAMINOPHEN 975 MG: 325 TABLET, FILM COATED ORAL at 14:47

## 2021-12-23 RX ADMIN — MELATONIN 5 MG TABLET 5 MG: at 17:11

## 2021-12-23 RX ADMIN — GUAIFENESIN 10 ML: 200 SOLUTION ORAL at 09:19

## 2021-12-23 RX ADMIN — VANCOMYCIN HYDROCHLORIDE 1250 MG: 5 INJECTION, POWDER, LYOPHILIZED, FOR SOLUTION INTRAVENOUS at 03:15

## 2021-12-23 RX ADMIN — AMPICILLIN SODIUM AND SULBACTAM SODIUM 3 G: 2; 1 INJECTION, POWDER, FOR SOLUTION INTRAMUSCULAR; INTRAVENOUS at 21:30

## 2021-12-23 RX ADMIN — POLYETHYLENE GLYCOL 3350 17 G: 17 POWDER, FOR SOLUTION ORAL at 09:00

## 2021-12-23 RX ADMIN — ACETAMINOPHEN 975 MG: 325 TABLET, FILM COATED ORAL at 06:34

## 2021-12-23 RX ADMIN — SENNOSIDES AND DOCUSATE SODIUM 1 TABLET: 50; 8.6 TABLET ORAL at 21:31

## 2021-12-23 RX ADMIN — VANCOMYCIN HYDROCHLORIDE 1500 MG: 1 INJECTION, POWDER, LYOPHILIZED, FOR SOLUTION INTRAVENOUS at 15:26

## 2021-12-23 RX ADMIN — AMPICILLIN SODIUM AND SULBACTAM SODIUM 3 G: 2; 1 INJECTION, POWDER, FOR SOLUTION INTRAMUSCULAR; INTRAVENOUS at 14:47

## 2021-12-23 RX ADMIN — IBUPROFEN 400 MG: 400 TABLET ORAL at 09:19

## 2021-12-23 RX ADMIN — AMPICILLIN SODIUM AND SULBACTAM SODIUM 3 G: 2; 1 INJECTION, POWDER, FOR SOLUTION INTRAMUSCULAR; INTRAVENOUS at 02:45

## 2021-12-23 RX ADMIN — INSULIN GLARGINE 20 UNITS: 100 INJECTION, SOLUTION SUBCUTANEOUS at 09:20

## 2021-12-23 RX ADMIN — GUAIFENESIN 10 ML: 200 SOLUTION ORAL at 17:11

## 2021-12-23 ASSESSMENT — ACTIVITIES OF DAILY LIVING (ADL)
ADLS_ACUITY_SCORE: 9
ADLS_ACUITY_SCORE: 8
ADLS_ACUITY_SCORE: 9
ADLS_ACUITY_SCORE: 8
ADLS_ACUITY_SCORE: 8
ADLS_ACUITY_SCORE: 9
ADLS_ACUITY_SCORE: 8
ADLS_ACUITY_SCORE: 9
ADLS_ACUITY_SCORE: 9
ADLS_ACUITY_SCORE: 8
ADLS_ACUITY_SCORE: 8
ADLS_ACUITY_SCORE: 9
ADLS_ACUITY_SCORE: 8
ADLS_ACUITY_SCORE: 9
ADLS_ACUITY_SCORE: 8

## 2021-12-23 NOTE — PROGRESS NOTES
[facility]    Hospitalist Progress Note    Date of Service (when I saw the patient): 12/23/2021    Assessment & Plan   Chris Shea is a 51 year old male with homelessness, uncontrolled diabetes with peripheral vascular disease, neuropathy, nephropathy, CKD stage II, chronic bilateral foot wounds, methamphetamine abuse, h/o tobacco abuse who was admitted to observation on 12/19/2021 for podiatry assessment and social work assistance.  Incidental Covid-19+ testing in an unvaccinated patient.     Right foot cellulitis with tenosynovitis and abscess  Chronic right mid foot ulcer  Status post right foot ulcer debridement, 12/22/2021  Afebrile, normal WBC, no hypotension. Lactate 1.0. Follows with [facility] wound clinic but has not received regular cares. Underwent excision and debridement of this foot ulcer 12/20/21.   MRI foot concerning for tenosynovitis of flexor digitorum longus with possible early osteo vs stress reaction as well.   CRP is 216, procal 0.19.   --ID following, appreciate assistance   --Currently on Unasyn and vancomycin per ID  --Podiatry consulted. S/p right foot ulcer debridement on 12/22.  Per Dr Neely's op report there is some purulence, but the wound was otherwise stable.  Closure of I&D site, no further surgery planned.  --DH2 offloading shoe for ambulation; heel WB  --PO abx course at discharge; coverage for MRSA, but consider Augmentin for possible poly-microbial infection.  Can tailor final PO abx plan based on final cultures and infectious disease recommendations.  --Dr Neely has placed foot-specific discharge instructions.  Podiatry has signed off.  --PT consulted.  --SW following, pt has significant concerns about where he will receive cares following surgery given homelessness.  Will need close follow-up as outpatient.  He may not require TCU, and placement would be complicated given his recent illicit drug use and positive Covid status.  Await input from  social work.       COVID-19 infection in unvaccinated patient  Incidental positive findings on screening lab. Denies dyspnea but reports occasional cough and chest pain worse with deep breathing.   He has remained afebrile and has oxygen saturation stable on room air.   CXR on admission is negative.   CRP is 216 (difficult to interpret given above infection) and D dimer 2.24.   --Surveillance COVID-19 PCR test result => POSITIVE  --Covid precautions  --No indication for treatment with steroids nor remdesivir as he does not meet criteria at present  --CT chest obtained to rule out PE given reports of chest pain today and significant d dimer elevation.  No evidence of pulmonary embolism.  Pulmonary findings typical of COVID-19 pneumonia.      Poorly controlled diabetes  Neuropathy, Peripheral Vascular Disease and Nephropathy secondary to diabetes  Pseudohyponatremia  CKD stage II  Prior admission was discharged with diabetes supplies and lantus with premeal aspart. He is not taking these.  A1c 9.7 this admission.   --consistent carb diet  --continue lantus 20 units in AM   --corrective dose aspart     HERNAN.   Cr on admission elevated at 1.3 from baseline 1-1.2. suspect hypovolemia.   --repeat BMP ordered, patient has been refusing  --may need some IVF but cautious use with COVID infection     Hyponatremia  Na 127 on admission. Suspect hypovolemia.   -- repeat labs showing 131--133     Cold exposure left foot  Cold left foot attributed wet boot and cold exposure in homeless patient. Lower extremity doppler with no evidence for high grade stenosis or occlusion of the left lower extremity arterial system. There were triphasic waveforms throughout the left lower extremity arterial system.   -no further evaluation needed     Methamphetamine Abuse  Noted. UDS positive this admission.      DVT Prophylaxis: Enoxaparin (Lovenox) subcutaneous- held in anticipation of surgery.   Code Status: Full Code     Disposition: Expected  discharge 1-2 days pending final recommendations from infectious disease for outpatient antibiotics, PT evaluation to assess his ambulation, and  to see if we can assist with his follow-up and any outpatient resources given homelessness.      Patient was discussed with Dr. Westfall who agrees with the above plan     Joseph Puckett    Interval History   Patient doing well.  Lying in bed on my arrival.  Denies any fever, chills, chest pain, nausea or vomiting.  He reports generalized fatigue, cough.  He attributes this to his Covid status.  Vital signs are stable.  Right foot dressing replaced, he reports mild pain.  Patient was concerned about what he would do when he is discharged from the hospital, explained to him that we will involve  to help with any resources available.    -Data reviewed today: I reviewed all new labs and imaging results over the last 24 hours. I personally reviewed no images or EKG's today.    Physical Exam   Temp: 98  F (36.7  C) Temp src: Oral BP: 115/74 Pulse: 80   Resp: 16 SpO2: 95 % O2 Device: None (Room air)    Vitals:    12/19/21 2302   Weight: 108.9 kg (240 lb)     Vital Signs with Ranges  Temp:  [97.6  F (36.4  C)-98.3  F (36.8  C)] 98  F (36.7  C)  Pulse:  [80-90] 80  Resp:  [16] 16  BP: (105-134)/(70-78) 115/74  SpO2:  [95 %-97 %] 95 %  I/O last 3 completed shifts:  In: -   Out: 1800 [Urine:1800]    Constitutional: Alert, oriented to person, place, situation.  Cooperative, lying in bed in NAD.   Respiratory: No labored breathing, speaking in full sentences, auscultation deferred given COVID-19 status.  GI:  Abdomen soft, nontender and nondistended   Skin/Integumen:  Warm, dry, non-diaphoretic.  Right lower extremity foot recently dressed/wrapped.  MSK: CMS x4 intact.  Neuro:  Speech is clear. Face symmetric. Follows commands    Medications     lactated ringers 25 mL/hr at 12/22/21 1129       acetaminophen  975 mg Oral Q8H     ampicillin-sulbactam  (UNASYN) IV  3 g Intravenous Q6H     diclofenac  4 g Topical 4x Daily     [Held by provider] enoxaparin ANTICOAGULANT  40 mg Subcutaneous Q24H     insulin aspart  1-10 Units Subcutaneous TID AC     insulin aspart  1-7 Units Subcutaneous At Bedtime     insulin glargine  20 Units Subcutaneous QAM AC     polyethylene glycol  17 g Oral Daily     senna-docusate  1 tablet Oral BID     sodium chloride (PF)  3 mL Intracatheter Q8H     sodium chloride (PF)  3 mL Intracatheter Q8H     vancomycin  1,250 mg Intravenous Q12H       Data   Recent Labs   Lab 12/23/21  0624 12/22/21  1732 12/22/21  1153 12/22/21  0839 12/22/21  0727 12/21/21  1522 12/20/21  0857 12/20/21  0017   WBC  --   --   --   --  5.9 5.9  --  6.4   HGB  --   --   --   --  12.1* 12.1*  --  13.8   MCV  --   --   --   --  84 85  --  83   PLT  --   --   --   --  282 264  --  258   NA  --   --   --   --  133 131*  --  127*   POTASSIUM  --   --   --   --  3.9 4.2  --  4.2   CHLORIDE  --   --   --   --  100 98  --  90*   CO2  --   --   --   --  25 26  --  32   BUN  --   --   --   --  15 20  --  25   CR  --   --   --   --  1.03 1.25  --  1.32*   ANIONGAP  --   --   --   --  8 7  --  5   ELINOR  --   --   --   --  8.2* 8.2*  --  8.8   * 217* 167*   < > 177* 149*   < > 266*   ALBUMIN  --   --   --   --   --   --   --  2.5*   PROTTOTAL  --   --   --   --   --   --   --  8.5   BILITOTAL  --   --   --   --   --   --   --  0.5   ALKPHOS  --   --   --   --   --   --   --  97   ALT  --   --   --   --   --   --   --  19   AST  --   --   --   --   --   --   --  22    < > = values in this interval not displayed.       No results found for this or any previous visit (from the past 24 hour(s)).

## 2021-12-23 NOTE — PHARMACY-VANCOMYCIN DOSING SERVICE
Pharmacy Vancomycin Note  Date of Service 2021  Patient's  1970   51 year old, male    Indication: Abscess  Day of Therapy: 4  Current vancomycin regimen:  1250 mg IV q12h  Current vancomycin monitoring method: AUC  Current vancomycin therapeutic monitoring goal: 400-600 mg*h/L    [employer]ight[employer] Prediction of Current Vancomycin Regimen    Loading dose: N/A  Regimen: 1250 mg IV every 12 hours.  Start time: 15:15 on 2021  Exposure target: AUC24 (range)400-600 mg/L.hr   AUC24,ss: 374 mg/L.hr  Probability of AUC24 > 400: 34 %  Ctrough,ss: 11.1 mg/L  Probability of Ctrough,ss > 20: 2 %  Probability of nephrotoxicity (Lodise MARKO ): 7 %    Current estimated CrCl = Estimated Creatinine Clearance: 110.6 mL/min (based on SCr of 1.03 mg/dL).    Creatinine for last 3 days  2021:  3:22 PM Creatinine 1.25 mg/dL  2021:  7:27 AM Creatinine 1.03 mg/dL    Recent Vancomycin Levels (past 3 days)  2021:  3:22 PM Vancomycin 8.5 mg/L  2021: 12:56 PM Vancomycin 12.3 mg/L    Vancomycin IV Administrations (past 72 hours)                   vancomycin 1250 mg in 0.9% NaCl 250 mL intermittent infusion 1,250 mg (mg) 1,250 mg New Bag 21 0315     1,250 mg New Bag 21 1526     1,250 mg New Bag  0259     1,250 mg New Bag 21 1648     1,250 mg New Bag  0422                Nephrotoxins and other renal medications (From now, onward)    Start     Dose/Rate Route Frequency Ordered Stop    21 1500  vancomycin 1500 mg in 0.9% NaCl 250 ml intermittent infusion 1,500 mg         1,500 mg  over 90 Minutes Intravenous EVERY 12 HOURS 21 1432      21 1330  ampicillin-sulbactam (UNASYN) 3 g vial to attach to  mL bag         3 g  over 15-30 Minutes Intravenous EVERY 6 HOURS 21 1314      21 0843  ibuprofen (ADVIL/MOTRIN) tablet 400 mg         400 mg Oral EVERY 6 HOURS PRN 21 0843               Contrast Orders - past 72 hours (72h ago, onward)            Start      Dose/Rate Route Frequency Ordered Stop    12/21/21 1830  iopamidol (ISOVUE-370) solution 79 mL         79 mL Intravenous ONCE 12/21/21 1813 12/21/21 1814    12/20/21 1700  gadobutrol (GADAVIST) injection 11 mL         11 mL Intravenous ONCE 12/20/21 1642 12/20/21 1805          Interpretation of levels and current regimen:  Vancomycin level is reflective of -600    Has serum creatinine changed greater than 50% in last 72 hours: No    Urine output:  good urine output    Renal Function: Improving    Insight[employer] Prediction of Planned New Vancomycin Regimen  Loading dose: N/A  Regimen: 1500 mg IV every 12 hours.  Start time: 15:15 on 12/23/2021  Exposure target: AUC24 (range)400-600 mg/L.hr   AUC24,ss: 448 mg/L.hr  Probability of AUC24 > 400: 76 %  Ctrough,ss: 13.4 mg/L  Probability of Ctrough,ss > 20: 7 %  Probability of nephrotoxicity (Lodise MARKO 2009): 9 %      Plan:  1. Increase Dose to 1500mg q12h  2. Vancomycin monitoring method: AUC  3. Vancomycin therapeutic monitoring goal: 400-600 mg*h/L  4. Pharmacy will check vancomycin levels as appropriate in 1-3 Days.  5. Serum creatinine levels will be ordered daily for the first week of therapy and at least twice weekly for subsequent weeks.    Guera Louise [facility]

## 2021-12-23 NOTE — PROGRESS NOTES
"[facility]    Infectious Disease Progress Note    Date of Service (when I saw the patient): 12/23/2021     Assessment & Plan   Chris Shea is a 51 year old male who was admitted on 12/19/2021.     Impression:  1. 51 y.o male with diabetes.   2. Methamphetamine use.   3. Chronic right foot ulcer.   4. Admitted on 9/12/2021 for sepsis due to a large neck abscess, s/p I&D cultures with MRSA, treated with IV antibiotics inpatient followed by oral bactrim. Patient tells me today he never took any oral antibiotics, never picked from pharmacy. Blood cultures that admission were negative.   5. Admitted now because has no place to live, tells me his homeless situation has gotten worse.   6. Tested positive for covid, some cough, no shortness of breath, no fever, CXR clear. CT scan with COVID findings, no hypoxia, not requiring oxygen. On no treatment.   7. CT scan shows continued fluid collection.   8. Very poor dentition.   9.S/P: Right foot ulcer debridement to level and including muscle tendon for two ulcers, measuring 3 x 1 x .5 cm and 1 x 1 x .5 cm     Recommendations:   No symptoms of COVID other than mild dry cough, CT scan with covid pneumonia, no hypoxia, on no treatment.   Follow up on the Pending blood cultures  Surgery consult noted:  no plans for I and D on the neck \" images reviewed in detail. No pus pocket seen. Thickened tissue, but not an abscess\"   S/p above mentioned procedure.     Plan for discharge   If blood cultures continue to be negative discharge on oral bactrim + augmentin for 2 more weeks.   Please note patient did not take discharge antibiotics last discharge perhaps can get antibiotics from hospital pharmacy before discharge.     Signing off   Please call if ques         Lexis Miller MD    Interval History   Tolerating antibiotics ok   No new rashes or issues with antibiotics   Labs reviewed   Afebrile     Physical Exam   Temp: 97.6  F (36.4  C) Temp src: Oral BP: " 123/83 Pulse: 74   Resp: 16 SpO2: 97 % O2 Device: None (Room air)    Vitals:    12/19/21 2302   Weight: 108.9 kg (240 lb)     Vital Signs with Ranges  Temp:  [97.6  F (36.4  C)-98.3  F (36.8  C)] 97.6  F (36.4  C)  Pulse:  [74-81] 74  Resp:  [16] 16  BP: (105-132)/(70-83) 123/83  SpO2:  [95 %-97 %] 97 %    Constitutional: Awake, alert, cooperative, no apparent distress  Lungs: Clear to auscultation bilaterally, no crackles or wheezing  Cardiovascular: Regular rate and rhythm, normal S1 and S2, and no murmur noted  Abdomen: Normal bowel sounds, soft, non-distended, non-tender  Skin: No rashes, no cyanosis, no edema  Other:    Medications     lactated ringers 25 mL/hr at 12/22/21 1129       acetaminophen  975 mg Oral Q8H     ampicillin-sulbactam (UNASYN) IV  3 g Intravenous Q6H     diclofenac  4 g Topical 4x Daily     [Held by provider] enoxaparin ANTICOAGULANT  40 mg Subcutaneous Q24H     insulin aspart  1-10 Units Subcutaneous TID AC     insulin aspart  1-7 Units Subcutaneous At Bedtime     insulin glargine  20 Units Subcutaneous QAM AC     polyethylene glycol  17 g Oral Daily     senna-docusate  1 tablet Oral BID     sodium chloride (PF)  3 mL Intracatheter Q8H     sodium chloride (PF)  3 mL Intracatheter Q8H     vancomycin  1,250 mg Intravenous Q12H       Data   All microbiology laboratory data reviewed.  Recent Labs   Lab Test 12/22/21  0727 12/21/21  1522 12/20/21  0017   WBC 5.9 5.9 6.4   HGB 12.1* 12.1* 13.8   HCT 37.0* 36.8* 40.9   MCV 84 85 83    264 258     Recent Labs   Lab Test 12/22/21  0727 12/21/21  1522 12/20/21  0017   CR 1.03 1.25 1.32*     No lab results found.  No lab results found.    Invalid input(s): CHE

## 2021-12-23 NOTE — PLAN OF CARE
VSS on room air. A/Ox4. Incision on bottom on right foot with sutures, ulceration with packing, all with new dressing per podiatry, shoe boot in room for ambulation. Pain controlled with PRN oxycodone and ibuprofen as well as scheduled tylenol. Refused to ambulate on shift, patient wanted to rest but repositioned and weight shifted independently in bed.  Mod carb diet. BS active, Flatus +. Voiding adequately to urinal. LS clear. IS to 2000.

## 2021-12-23 NOTE — PROGRESS NOTES
"Foot & Ankle Surgery Progress Note  December 23, 2021    S:  Chris was seen at bedside today for continued monitoring of R foot POD#1 sp I&D for abscess/tenosynovitis by Dr Neely.  Intra-op findings, while demonstrating purulence, otherwise were fairly benign.  The patient has a DH2 offloading shoe in his room.    /83 (BP Location: Right arm, Patient Position: Supine)   Pulse 74   Temp 97.6  F (36.4  C) (Oral)   Resp 16   Ht 1.905 m (6' 3\")   Wt 108.9 kg (240 lb)   SpO2 97%   BMI 30.00 kg/m      ROS - Pos for CC.  Denies nausea, vomiting, chills, fever, belly pain, calf pain, chest pain or shortness of breath. Complete remainder of ROS is otherwise neg.      PE - right lower extremity - ulcer sub 1st MPJ stable.  Incision plantar forefoot re-approximated without gapping or necrosis.  No arch pain and no pain with PROM hallux.  Skin shows no trophic, color or temperature changes otherwise.  No calf redness, swelling or pain noted otherwise.    Imaging:  MR R foot 12/19/21 - Impression:  1. Findings concerning for pyogenic tenosynovitis of the flexor  digitorum longus from the level of the excision plantar to the first  metatarsal head and extending proximally along the tendon towards a  proximal collection thought to represent abscess with debris  immediately adjacent to the medial cuneiform in the area of the  quadratus plantae..  2. Subtle T2 signal of the navicular, intermediate cuneiform, and  navicular bones without significant T2 signal changes and subtle  contrast enhancement favoring stress reaction, however, given other  findings, early osteomyelitis is not completely excluded. Attention on  follow-up.  3.Extensive soft tissue and muscle edema/contrast-enhancement  consistent with cellulitis/myositis.    Cultures:  Cultures pending; NGTD    Assessment:  51 year old male uncontrolled diabetic male with chronic R foot ulcer POD#1 sp I&D of wound for infectious tenosynovitis    Plan:  Dressing " change  -reviewed procedure with patient per Dr Neely's op report.  Some purulence, but the wound was otherwise stable.  Closure of I&D site, no further surgery planned  -DH2 offloading shoe for ambulation; heel WB  -PO abx course at discharge; coverage for MRSA, but consider Augmentin for possible poly-microbial infection.  Can tailor final PO abx plan based on final cultures  -Dr Neely has placed foot-specific discharge instructions.  Will sign off.  Please call with questions or acute changes to patient/wound status     Activity -  Heel WB in DH2 shoe   Pain Management -  Oxycodone, tylenol    DVT Prophylaxis -  mechanical   Abx therapy -  PO abx at discharge; MRSA coverage, will tailor to final C&S.         Eric Loaiza DPM FACFAS FACFAOM  Podiatric Foot & Ankle Surgeon  [facility]  [phone]

## 2021-12-23 NOTE — PLAN OF CARE
A&Ox4, frustrated and uncooperative this shift.  POD 1 of I&D of R foot.  Refused all vitals, only allowed 0600 BG check which was 138.  Scheduled Tylenol for pain.  Up w/ asst 1, not out of bed this shift.  WBAT to R heel with orthotic shoe.  R foot dressing CDI, refused assessment.  Covid/Contact precautions maintained.  Intermit Abx, Unasyn and Vanco.   Discharge 2+ days pending, likely TCU.

## 2021-12-23 NOTE — CONSULTS
Received RN Admission Screen (12/23/21)   Have you recently lost weight without trying? - Unsure   Have you eaten poorly because of a decreased appetite? - No     Reviewed weight trends in Epic     Wt Readings from Last 10 Encounters:   12/19/21 108.9 kg (240 lb)   09/20/21 108.4 kg (239 lb)   02/22/12 122.5 kg (270 lb)   09/27/10 118.6 kg (261 lb 6.4 oz)   09/13/10 119.6 kg (263 lb 9.6 oz)   07/23/10 120.7 kg (266 lb 3.2 oz)   04/19/10 126.1 kg (278 lb)   02/26/10 126.6 kg (279 lb)     No recent weight loss noted    Patient is ordering meals and consuming 100% per flowsheets.    Will defer full nutrition assessment at this time.    Delphine España, RD, LD, [facility]   Clinical Dietitian - [facility]

## 2021-12-24 ENCOUNTER — APPOINTMENT (OUTPATIENT)
Dept: PHYSICAL THERAPY | Facility: CLINIC | Age: 51
End: 2021-12-24
Attending: PODIATRIST
Payer: COMMERCIAL

## 2021-12-24 LAB
ANION GAP SERPL CALCULATED.3IONS-SCNC: 5 MMOL/L (ref 3–14)
BUN SERPL-MCNC: 12 MG/DL (ref 7–30)
CALCIUM SERPL-MCNC: 8.3 MG/DL (ref 8.5–10.1)
CHLORIDE BLD-SCNC: 105 MMOL/L (ref 94–109)
CO2 SERPL-SCNC: 28 MMOL/L (ref 20–32)
CREAT SERPL-MCNC: 0.9 MG/DL (ref 0.66–1.25)
ERYTHROCYTE [DISTWIDTH] IN BLOOD BY AUTOMATED COUNT: 12.4 % (ref 10–15)
GFR SERPL CREATININE-BSD FRML MDRD: >90 ML/MIN/1.73M2
GLUCOSE BLD-MCNC: 93 MG/DL (ref 70–99)
GLUCOSE BLDC GLUCOMTR-MCNC: 192 MG/DL (ref 70–99)
GLUCOSE BLDC GLUCOMTR-MCNC: 83 MG/DL (ref 70–99)
GLUCOSE BLDC GLUCOMTR-MCNC: 91 MG/DL (ref 70–99)
GLUCOSE BLDC GLUCOMTR-MCNC: 98 MG/DL (ref 70–99)
HCT VFR BLD AUTO: 36.3 % (ref 40–53)
HGB BLD-MCNC: 11.8 G/DL (ref 13.3–17.7)
MCH RBC QN AUTO: 27.9 PG (ref 26.5–33)
MCHC RBC AUTO-ENTMCNC: 32.5 G/DL (ref 31.5–36.5)
MCV RBC AUTO: 86 FL (ref 78–100)
PLATELET # BLD AUTO: 352 10E3/UL (ref 150–450)
POTASSIUM BLD-SCNC: 3.8 MMOL/L (ref 3.4–5.3)
RBC # BLD AUTO: 4.23 10E6/UL (ref 4.4–5.9)
SODIUM SERPL-SCNC: 138 MMOL/L (ref 133–144)
WBC # BLD AUTO: 7.2 10E3/UL (ref 4–11)

## 2021-12-24 PROCEDURE — 250N000011 HC RX IP 250 OP 636: Performed by: PODIATRIST

## 2021-12-24 PROCEDURE — 36415 COLL VENOUS BLD VENIPUNCTURE: CPT | Performed by: PHYSICIAN ASSISTANT

## 2021-12-24 PROCEDURE — 250N000013 HC RX MED GY IP 250 OP 250 PS 637: Performed by: PODIATRIST

## 2021-12-24 PROCEDURE — 97161 PT EVAL LOW COMPLEX 20 MIN: CPT | Mod: GP | Performed by: PHYSICAL THERAPIST

## 2021-12-24 PROCEDURE — 85027 COMPLETE CBC AUTOMATED: CPT | Performed by: PHYSICIAN ASSISTANT

## 2021-12-24 PROCEDURE — 97116 GAIT TRAINING THERAPY: CPT | Mod: GP | Performed by: PHYSICAL THERAPIST

## 2021-12-24 PROCEDURE — 250N000011 HC RX IP 250 OP 636: Performed by: HOSPITALIST

## 2021-12-24 PROCEDURE — 250N000011 HC RX IP 250 OP 636: Performed by: INTERNAL MEDICINE

## 2021-12-24 PROCEDURE — 97530 THERAPEUTIC ACTIVITIES: CPT | Mod: GP | Performed by: PHYSICAL THERAPIST

## 2021-12-24 PROCEDURE — 258N000003 HC RX IP 258 OP 636: Performed by: INTERNAL MEDICINE

## 2021-12-24 PROCEDURE — 120N000004 HC R&B MS OVERFLOW

## 2021-12-24 PROCEDURE — 80048 BASIC METABOLIC PNL TOTAL CA: CPT | Performed by: PHYSICIAN ASSISTANT

## 2021-12-24 PROCEDURE — 99233 SBSQ HOSP IP/OBS HIGH 50: CPT | Performed by: HOSPITALIST

## 2021-12-24 RX ADMIN — AMPICILLIN SODIUM AND SULBACTAM SODIUM 3 G: 2; 1 INJECTION, POWDER, FOR SOLUTION INTRAMUSCULAR; INTRAVENOUS at 20:50

## 2021-12-24 RX ADMIN — IBUPROFEN 400 MG: 400 TABLET ORAL at 18:55

## 2021-12-24 RX ADMIN — ACETAMINOPHEN 975 MG: 325 TABLET, FILM COATED ORAL at 17:20

## 2021-12-24 RX ADMIN — GUAIFENESIN 10 ML: 200 SOLUTION ORAL at 21:21

## 2021-12-24 RX ADMIN — AMPICILLIN SODIUM AND SULBACTAM SODIUM 3 G: 2; 1 INJECTION, POWDER, FOR SOLUTION INTRAMUSCULAR; INTRAVENOUS at 08:56

## 2021-12-24 RX ADMIN — AMPICILLIN SODIUM AND SULBACTAM SODIUM 3 G: 2; 1 INJECTION, POWDER, FOR SOLUTION INTRAMUSCULAR; INTRAVENOUS at 15:19

## 2021-12-24 RX ADMIN — ACETAMINOPHEN 975 MG: 325 TABLET, FILM COATED ORAL at 06:21

## 2021-12-24 RX ADMIN — ENOXAPARIN SODIUM 40 MG: 40 INJECTION SUBCUTANEOUS at 18:55

## 2021-12-24 RX ADMIN — GUAIFENESIN 10 ML: 200 SOLUTION ORAL at 15:32

## 2021-12-24 RX ADMIN — OXYCODONE HYDROCHLORIDE 5 MG: 5 TABLET ORAL at 02:41

## 2021-12-24 RX ADMIN — DICLOFENAC SODIUM 4 G: 10 GEL TOPICAL at 21:02

## 2021-12-24 RX ADMIN — INSULIN GLARGINE 20 UNITS: 100 INJECTION, SOLUTION SUBCUTANEOUS at 08:54

## 2021-12-24 RX ADMIN — POLYETHYLENE GLYCOL 3350 17 G: 17 POWDER, FOR SOLUTION ORAL at 08:44

## 2021-12-24 RX ADMIN — MELATONIN 5 MG TABLET 5 MG: at 21:21

## 2021-12-24 RX ADMIN — VANCOMYCIN HYDROCHLORIDE 1500 MG: 1 INJECTION, POWDER, LYOPHILIZED, FOR SOLUTION INTRAVENOUS at 03:13

## 2021-12-24 RX ADMIN — ONDANSETRON 4 MG: 4 TABLET, ORALLY DISINTEGRATING ORAL at 15:31

## 2021-12-24 RX ADMIN — OXYCODONE HYDROCHLORIDE 5 MG: 5 TABLET ORAL at 21:21

## 2021-12-24 RX ADMIN — IBUPROFEN 400 MG: 400 TABLET ORAL at 02:41

## 2021-12-24 RX ADMIN — SENNOSIDES AND DOCUSATE SODIUM 1 TABLET: 50; 8.6 TABLET ORAL at 20:49

## 2021-12-24 RX ADMIN — ONDANSETRON 4 MG: 2 INJECTION INTRAMUSCULAR; INTRAVENOUS at 09:14

## 2021-12-24 RX ADMIN — GUAIFENESIN 10 ML: 200 SOLUTION ORAL at 09:14

## 2021-12-24 RX ADMIN — VANCOMYCIN HYDROCHLORIDE 1500 MG: 1 INJECTION, POWDER, LYOPHILIZED, FOR SOLUTION INTRAVENOUS at 15:42

## 2021-12-24 RX ADMIN — SENNOSIDES AND DOCUSATE SODIUM 1 TABLET: 50; 8.6 TABLET ORAL at 08:44

## 2021-12-24 RX ADMIN — AMPICILLIN SODIUM AND SULBACTAM SODIUM 3 G: 2; 1 INJECTION, POWDER, FOR SOLUTION INTRAMUSCULAR; INTRAVENOUS at 02:36

## 2021-12-24 ASSESSMENT — ACTIVITIES OF DAILY LIVING (ADL)
ADLS_ACUITY_SCORE: 9
ADLS_ACUITY_SCORE: 8
ADLS_ACUITY_SCORE: 9
ADLS_ACUITY_SCORE: 8
ADLS_ACUITY_SCORE: 8
ADLS_ACUITY_SCORE: 9
ADLS_ACUITY_SCORE: 8
ADLS_ACUITY_SCORE: 8
ADLS_ACUITY_SCORE: 9
ADLS_ACUITY_SCORE: 8
ADLS_ACUITY_SCORE: 8
ADLS_ACUITY_SCORE: 9
ADLS_ACUITY_SCORE: 8
ADLS_ACUITY_SCORE: 9
ADLS_ACUITY_SCORE: 8
ADLS_ACUITY_SCORE: 9
ADLS_ACUITY_SCORE: 8
ADLS_ACUITY_SCORE: 9
ADLS_ACUITY_SCORE: 8
ADLS_ACUITY_SCORE: 8

## 2021-12-24 NOTE — PROVIDER NOTIFICATION
"Page to Dr. Westfall \"Would you like lovenox dose given today, or is it supposed to start tomorrow?\"    "

## 2021-12-24 NOTE — PROGRESS NOTES
[facility]    Hospitalist Progress Note    Date of Service (when I saw the patient): 12/24/2021    Assessment & Plan   Chris Shea is a 51 year old male with homelessness, uncontrolled diabetes with peripheral vascular disease, neuropathy, nephropathy, CKD stage II, chronic bilateral foot wounds, methamphetamine abuse, h/o tobacco abuse who presented on 12/19/2021 with chornic wound/ulcer R foot.   Incidental Covid-19+ testing in an unvaccinated patient.     Right foot cellulitis with tenosynovitis and abscess  Chronic right mid foot ulcer  Status post right foot ulcer debridement, 12/22/2021  Afebrile, normal WBC, no hypotension. Lactate 1.0. Follows with [facility] wound clinic but has not received regular cares. Underwent excision and debridement of this foot ulcer 12/20/21.   MRI foot concerning for tenosynovitis of flexor digitorum longus with possible early osteo vs stress reaction as well.   CRP is 216, procal 0.19.   --Currently on Unasyn and vancomycin per ID  --Podiatry consulted. S/p right foot ulcer debridement on 12/22.    --DH2 offloading shoe for ambulation; heel WB  --PO abx course at discharge per ID Augmentin and Bactrim for two more weeks if BCs continue negative.  --Dr Neely has placed foot-specific discharge instructions.  Podiatry has signed off.  --PT consulted.  --Obstacles to discharge is homelessness and Covid positive status.  Although generally asymptomatic with Covid he was diagnosed on 12/20/2021 with first date able to identify as recovered status 12/30/2021.  SW following, [facility] and higher ground homeless shelter.     COVID-19 infection in unvaccinated patient  Incidental positive findings on screening lab. Denies dyspnea but reports occasional cough and chest pain worse with deep breathing.   He has remained afebrile and has oxygen saturation stable on room air.   CXR on admission is negative.    CRP is 216 (difficult to interpret given above infection) and D dimer 2.24.   --Surveillance COVID-19 PCR test result => POSITIVE  --Covid precautions  --No indication for treatment with steroids nor remdesivir as he does not meet criteria at present  --CT chest obtained to rule out PE given reports of chest pain today and significant d dimer elevation.  No evidence of pulmonary embolism.  Pulmonary findings typical of COVID-19 pneumonia.      Poorly controlled diabetes  Neuropathy, Peripheral Vascular Disease and Nephropathy secondary to diabetes  Pseudohyponatremia  CKD stage II  Prior admission was discharged with diabetes supplies and lantus with premeal aspart. He is not taking these.  A1c 9.7 this admission.   --consistent carb diet  --continue lantus 20 units in AM   --corrective dose aspart  -Glucoses running tight over the last 24 hours 80-90, no SSI, decrease Lantus to 20 units.     HERNAN.   Cr on admission elevated at 1.3 from baseline 1-1.2. suspect hypovolemia.   --Last BMP 12/24/2021 normal.  Patient generally refuses lab draws.     Hyponatremia  Na 127 on admission. Suspect hypovolemia.   --Latest sodium 12/24/2021 138.     Cold exposure left foot  Cold left foot attributed wet boot and cold exposure in homeless patient. Lower extremity doppler with no evidence for high grade stenosis or occlusion of the left lower extremity arterial system. There were triphasic waveforms throughout the left lower extremity arterial system.   -no further evaluation needed, strongly advised to DC tobacco.     Methamphetamine Abuse  Noted. UDS positive this admission.      DVT Prophylaxis: Enoxaparin (Lovenox) subcutaneous- held in anticipation of surgery.   No further surgeries planned, in view of positive Covid status restart.  Code Status: Full Code     Disposition:  TBD pending clinical improvement, safe discharge, patient homeless presenting severe obstacles regarding compliance to wound healing, infection and abx  use, high risk for recurrent hospitalization.     Total unit/floor time 35 minutes:  time consisted of the following assuming Patient care in complex Patient with recurrent foot wound, noncompliance, Covid positive and social factors presenting obstacles to care plan including homelessness, examination of patient, review of records including labs, imaging results, medications, interdisciplinary notes and completing documentation; > 50%  Coordination of Care time with Nursing  and SW regarding discharge planning and coordination with Specially ID regarding wound/antibiotic treatment plan, management and surveillance.     Lele Westfall    Interval History   Continues in Covid isolation, first able to determine recovered 12/30/2021.  No O2 desat, cough, sputum.  Nursing reports no wound issues, afebrile.    -Data reviewed today: I reviewed all new labs and imaging results over the last 24 hours.    Physical Exam   Temp: 98.4  F (36.9  C) Temp src: Oral BP: (!) 139/91 Pulse: 77   Resp: 16 SpO2: 96 % O2 Device: None (Room air)    Vitals:    12/19/21 2302   Weight: 108.9 kg (240 lb)     Vital Signs with Ranges  Temp:  [97.6  F (36.4  C)-98.4  F (36.9  C)] 98.4  F (36.9  C)  Pulse:  [75-77] 77  Resp:  [15-16] 16  BP: (128-142)/(86-91) 139/91  SpO2:  [95 %-97 %] 96 %  I/O last 3 completed shifts:  In: 600 [P.O.:600]  Out: 1075 [Urine:1075]    Constitutional:  NAD, alert, calm, cooperative    Respiratory: No labored breathing, speaking in full sentences, auscultation deferred given COVID-19 status.  GI:  Abdomen soft, nontender and nondistended   Skin/Integumen:  Warm, dry, non-diaphoretic.  Right lower extremity foot recently dressed/wrapped.  MSK: CMS x4 intact.  Neuro:  Speech is clear. Face symmetric. Follows commands    Medications     lactated ringers 25 mL/hr at 12/22/21 1129       acetaminophen  975 mg Oral Q8H     ampicillin-sulbactam (UNASYN) IV  3 g Intravenous Q6H     diclofenac  4 g Topical 4x Daily      [Held by provider] enoxaparin ANTICOAGULANT  40 mg Subcutaneous Q24H     insulin aspart  1-10 Units Subcutaneous TID AC     insulin aspart  1-7 Units Subcutaneous At Bedtime     insulin glargine  20 Units Subcutaneous QAM AC     polyethylene glycol  17 g Oral Daily     senna-docusate  1 tablet Oral BID     sodium chloride (PF)  3 mL Intracatheter Q8H     vancomycin  1,500 mg Intravenous Q12H       Data   Recent Labs   Lab 12/24/21  1141 12/24/21  0623 12/24/21  0606 12/22/21  0839 12/22/21  0727 12/21/21  1522 12/20/21  0857 12/20/21  0017   WBC  --   --  7.2  --  5.9 5.9  --  6.4   HGB  --   --  11.8*  --  12.1* 12.1*  --  13.8   MCV  --   --  86  --  84 85  --  83   PLT  --   --  352  --  282 264  --  258   NA  --   --  138  --  133 131*  --  127*   POTASSIUM  --   --  3.8  --  3.9 4.2  --  4.2   CHLORIDE  --   --  105  --  100 98  --  90*   CO2  --   --  28  --  25 26  --  32   BUN  --   --  12  --  15 20  --  25   CR  --   --  0.90  --  1.03 1.25  --  1.32*   ANIONGAP  --   --  5  --  8 7  --  5   ELINOR  --   --  8.3*  --  8.2* 8.2*  --  8.8   GLC 83 98 93   < > 177* 149*   < > 266*   ALBUMIN  --   --   --   --   --   --   --  2.5*   PROTTOTAL  --   --   --   --   --   --   --  8.5   BILITOTAL  --   --   --   --   --   --   --  0.5   ALKPHOS  --   --   --   --   --   --   --  97   ALT  --   --   --   --   --   --   --  19   AST  --   --   --   --   --   --   --  22    < > = values in this interval not displayed.

## 2021-12-24 NOTE — PLAN OF CARE
"VSS on room air. A/Ox4. Incision on bottom on right foot with sutures, ulceration with packing, all with new dressing per podiatry, dressing change scheduled tomorrow- see discharge for instructions. Shoe boot in room for ambulation, however patient states \"I need to rest\" and refused to ambulate with RN and PT. Pain controlled with PRN ibuprofen as well as scheduled tylenol. Mod carb diet, refused blood sugar checks with lunch and dinner. BS active, Flatus +. Voiding adequately to urinal. LS clear.   "

## 2021-12-24 NOTE — PROGRESS NOTES
Care Management Follow Up    Length of Stay (days): 3    Expected Discharge Date: 12/25/2021     Concerns to be Addressed: compliance issue     Patient plan of care discussed at interdisciplinary rounds: Yes    Anticipated Discharge Disposition:  Homeless shelter vs COVID TCU.     Anticipated Discharge Services:    Anticipated Discharge DME:      Patient/family educated on Medicare website which has current facility and service quality ratings:    Education Provided on the Discharge Plan:    Patient/Family in Agreement with the Plan:      Referrals Placed by CM/SW:  [facility]  Private pay costs discussed: Not applicable    Additional Information:  SW left VM at [facility].  SW left VM at 1101, requesting return call for adm's at [facility], the only [facility] at this time. Return call pending      MIKHAIL to continue to follow and assist with discharge planning.    SHIN Freedman  Daytime (8:00am-4:30pm): [phone]  After-Hours SW Pager (4:30pm-11:30pm): [phone]           SHIN Carreon

## 2021-12-24 NOTE — PLAN OF CARE
A&Ox4. POD 2 of I&D of R foot.  Refused vitals this shift. C/o R foot pain 7/10, PRN ibuprofen and oxycodone x1.  Up w/ asst 1, not out of bed this shift.  WBAT to R heel with orthotic shoe.  R foot dressing CDI.  Covid/Contact precautions maintained.  Intermit Abx, Unasyn and Vanco.    Discharge 1-2 days pending final ID recs and PT eval.
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
## 2021-12-25 LAB
BACTERIA BLD CULT: NO GROWTH
BACTERIA BLD CULT: NO GROWTH
CREAT SERPL-MCNC: 0.9 MG/DL (ref 0.66–1.25)
GFR SERPL CREATININE-BSD FRML MDRD: >90 ML/MIN/1.73M2
GLUCOSE BLDC GLUCOMTR-MCNC: 106 MG/DL (ref 70–99)
GLUCOSE BLDC GLUCOMTR-MCNC: 131 MG/DL (ref 70–99)
GLUCOSE BLDC GLUCOMTR-MCNC: 163 MG/DL (ref 70–99)
GLUCOSE BLDC GLUCOMTR-MCNC: 183 MG/DL (ref 70–99)
GLUCOSE BLDC GLUCOMTR-MCNC: 97 MG/DL (ref 70–99)
PLATELET # BLD AUTO: 465 10E3/UL (ref 150–450)
VANCOMYCIN SERPL-MCNC: 11.5 MG/L

## 2021-12-25 PROCEDURE — 258N000003 HC RX IP 258 OP 636: Performed by: INTERNAL MEDICINE

## 2021-12-25 PROCEDURE — 80202 ASSAY OF VANCOMYCIN: CPT | Performed by: INTERNAL MEDICINE

## 2021-12-25 PROCEDURE — 85049 AUTOMATED PLATELET COUNT: CPT | Performed by: INTERNAL MEDICINE

## 2021-12-25 PROCEDURE — 250N000013 HC RX MED GY IP 250 OP 250 PS 637: Performed by: PODIATRIST

## 2021-12-25 PROCEDURE — 120N000004 HC R&B MS OVERFLOW

## 2021-12-25 PROCEDURE — 250N000011 HC RX IP 250 OP 636: Performed by: PODIATRIST

## 2021-12-25 PROCEDURE — 250N000013 HC RX MED GY IP 250 OP 250 PS 637: Performed by: HOSPITALIST

## 2021-12-25 PROCEDURE — 82565 ASSAY OF CREATININE: CPT | Performed by: INTERNAL MEDICINE

## 2021-12-25 PROCEDURE — 36415 COLL VENOUS BLD VENIPUNCTURE: CPT | Performed by: INTERNAL MEDICINE

## 2021-12-25 PROCEDURE — 250N000011 HC RX IP 250 OP 636: Performed by: INTERNAL MEDICINE

## 2021-12-25 PROCEDURE — 99232 SBSQ HOSP IP/OBS MODERATE 35: CPT | Performed by: HOSPITALIST

## 2021-12-25 RX ORDER — CEFAZOLIN SODIUM 1 G/50ML
1750 SOLUTION INTRAVENOUS EVERY 12 HOURS
Status: DISCONTINUED | OUTPATIENT
Start: 2021-12-25 | End: 2021-12-31

## 2021-12-25 RX ORDER — AMLODIPINE BESYLATE 5 MG/1
5 TABLET ORAL DAILY
Status: DISCONTINUED | OUTPATIENT
Start: 2021-12-25 | End: 2021-12-27

## 2021-12-25 RX ADMIN — OXYCODONE HYDROCHLORIDE 5 MG: 5 TABLET ORAL at 20:42

## 2021-12-25 RX ADMIN — ENOXAPARIN SODIUM 40 MG: 40 INJECTION SUBCUTANEOUS at 12:30

## 2021-12-25 RX ADMIN — GUAIFENESIN 10 ML: 200 SOLUTION ORAL at 09:26

## 2021-12-25 RX ADMIN — AMPICILLIN SODIUM AND SULBACTAM SODIUM 3 G: 2; 1 INJECTION, POWDER, FOR SOLUTION INTRAMUSCULAR; INTRAVENOUS at 20:40

## 2021-12-25 RX ADMIN — VANCOMYCIN HYDROCHLORIDE 1500 MG: 1 INJECTION, POWDER, LYOPHILIZED, FOR SOLUTION INTRAVENOUS at 03:48

## 2021-12-25 RX ADMIN — ENOXAPARIN SODIUM 40 MG: 40 INJECTION SUBCUTANEOUS at 20:42

## 2021-12-25 RX ADMIN — ONDANSETRON 4 MG: 4 TABLET, ORALLY DISINTEGRATING ORAL at 09:26

## 2021-12-25 RX ADMIN — DICLOFENAC SODIUM 4 G: 10 GEL TOPICAL at 20:44

## 2021-12-25 RX ADMIN — GUAIFENESIN 10 ML: 200 SOLUTION ORAL at 20:42

## 2021-12-25 RX ADMIN — POLYETHYLENE GLYCOL 3350 17 G: 17 POWDER, FOR SOLUTION ORAL at 09:24

## 2021-12-25 RX ADMIN — AMPICILLIN SODIUM AND SULBACTAM SODIUM 3 G: 2; 1 INJECTION, POWDER, FOR SOLUTION INTRAMUSCULAR; INTRAVENOUS at 03:02

## 2021-12-25 RX ADMIN — INSULIN ASPART 1 UNITS: 100 INJECTION, SOLUTION INTRAVENOUS; SUBCUTANEOUS at 19:12

## 2021-12-25 RX ADMIN — MELATONIN 5 MG TABLET 5 MG: at 20:42

## 2021-12-25 RX ADMIN — AMPICILLIN SODIUM AND SULBACTAM SODIUM 3 G: 2; 1 INJECTION, POWDER, FOR SOLUTION INTRAMUSCULAR; INTRAVENOUS at 14:24

## 2021-12-25 RX ADMIN — AMPICILLIN SODIUM AND SULBACTAM SODIUM 3 G: 2; 1 INJECTION, POWDER, FOR SOLUTION INTRAMUSCULAR; INTRAVENOUS at 09:03

## 2021-12-25 RX ADMIN — DICLOFENAC SODIUM 4 G: 10 GEL TOPICAL at 17:19

## 2021-12-25 RX ADMIN — AMLODIPINE BESYLATE 5 MG: 5 TABLET ORAL at 09:07

## 2021-12-25 RX ADMIN — SENNOSIDES AND DOCUSATE SODIUM 1 TABLET: 50; 8.6 TABLET ORAL at 09:10

## 2021-12-25 RX ADMIN — VANCOMYCIN HYDROCHLORIDE 1750 MG: 1 INJECTION, POWDER, LYOPHILIZED, FOR SOLUTION INTRAVENOUS at 17:18

## 2021-12-25 RX ADMIN — ONDANSETRON 4 MG: 4 TABLET, ORALLY DISINTEGRATING ORAL at 17:18

## 2021-12-25 RX ADMIN — ACETAMINOPHEN 975 MG: 325 TABLET, FILM COATED ORAL at 02:57

## 2021-12-25 RX ADMIN — SENNOSIDES AND DOCUSATE SODIUM 1 TABLET: 50; 8.6 TABLET ORAL at 20:42

## 2021-12-25 ASSESSMENT — ACTIVITIES OF DAILY LIVING (ADL)
ADLS_ACUITY_SCORE: 9

## 2021-12-25 NOTE — PROGRESS NOTES
[facility]    Hospitalist Progress Note    Date of Service (when I saw the patient): 12/25/2021    Assessment & Plan   Chris Shea is a 51 year old male with homelessness, uncontrolled diabetes with peripheral vascular disease, neuropathy, nephropathy, CKD stage II, chronic bilateral foot wounds, methamphetamine abuse, h/o tobacco abuse who presented on 12/19/2021 with chornic wound/ulcer R foot.   Incidental Covid-19+ testing in an unvaccinated patient.     Right foot cellulitis with tenosynovitis and abscess  Chronic right mid foot ulcer  Status post right foot ulcer debridement, 12/22/2021  Afebrile, normal WBC, no hypotension. Lactate 1.0. [place] [facility] has not received regular cares. Underwent excision and debridement of this foot ulcer 12/20/21.   MRI foot concerning for tenosynovitis of flexor digitorum longus with possible early osteo vs stress reaction as well.   CRP is 216, procal 0.19.   --Currently on Unasyn and vancomycin per ID  --Podiatry consulted. S/p right foot ulcer debridement on 12/22.    --DH2 offloading shoe for ambulation; heel WB  --PO abx course at discharge per ID Augmentin and Bactrim for two more weeks if BCs continue negative.  --Dr Neely has placed foot-specific discharge instructions.  Podiatry has signed off.  --PT consulted.  --Obstacles to discharge is homelessness and Covid positive status.  Although generally asymptomatic with Covid he was diagnosed on 12/20/2021 with first date able to identify as recovered status 12/30/2021.  SW following, have referrals to [facility] TCU allowing positive Covid patients and higher ground homeless shelter.  -Discussion with patient today 12/25/2021, patient is receptive to TCU or homeless shelter as he knows his homelessness status is obstacle for recovery of both regards to his wounds and general status.     COVID-19 infection in unvaccinated patient  Incidental positive findings on  screening lab. Denies dyspnea but reports occasional cough and chest pain worse with deep breathing.   He has remained afebrile and has oxygen saturation stable on room air.   CXR on admission is negative.   CRP is 216 (difficult to interpret given above infection) and D dimer 2.24.   --Surveillance COVID-19 PCR test result => POSITIVE  --Covid precautions  --No indication for treatment with steroids nor remdesivir as he does not meet criteria at present  --CT chest obtained to rule out PE given reports of chest pain today and significant d dimer elevation.  No evidence of pulmonary embolism.  Pulmonary findings typical of COVID-19 pneumonia.      Poorly controlled diabetes  Neuropathy, Peripheral Vascular Disease and Nephropathy secondary to diabetes  Pseudohyponatremia  CKD stage II  Prior admission was discharged with diabetes supplies and lantus with premeal aspart. He is not taking these.  A1c 9.7 this admission.   --consistent carb diet  --continue lantus 20 units in AM   --corrective dose aspart  -Glucoses running tight over the last 24 hours 80-90, no SSI, decrease Lantus to 20 units. Continue to monitor glucoses, adjust insulin accordingly. Patient eating.     HERNAN.   Cr on admission elevated at 1.3 from baseline 1-1.2. suspect hypovolemia.   --Last BMP 12/24/2021 normal.  Patient generally refuses lab draws.     Hyponatremia  Na 127 on admission. Suspect hypovolemia.   --Latest sodium 12/24/2021 138.     Cold exposure left foot  Cold left foot attributed wet boot and cold exposure in homeless patient. Lower extremity doppler with no evidence for high grade stenosis or occlusion of the left lower extremity arterial system. There were triphasic waveforms throughout the left lower extremity arterial system.   -no further evaluation needed, strongly advised to DC tobacco.     Methamphetamine Abuse  Noted. UDS positive this admission.      DVT Prophylaxis: Enoxaparin (Lovenox) subcutaneous- held in anticipation  of surgery.   No further surgeries planned, in view of positive Covid status restart.  Code Status: Full Code     Disposition:  TBD pending clinical improvement, safe discharge, patient homeless presenting severe obstacles regarding compliance to wound healing, infection and abx use, high risk for recurrent hospitalization. SW assisting with discharge planning including TCU and/or homeless shelter.     I spent 25 minutes on the unit/floor in management of care today reviewing labs, medications, interdisciplinary notes; and completing documentation of encounter and orders with over 50% of my time was spent Counseling the Patient regarding wounds, antibiotic therapy, PT homeless status, patient now receptive/compliant  to discharge planning and Coordinating Care and plan with Nursing regarding wounds and discharge planning and SW regarding discharge planning  Lele Bendershlomo    Interval History   Patient minimizes his situation of homelessness limiting wound care and recovery. Receptive to TCU or homeless shelter. Patient acknowledges Covid status as obstacle for discharge planning. Nurses report no wound issues, afebrile. No cough, dyspnea, O2 desat.    -Data reviewed today: I reviewed all new labs and imaging results over the last 24 hours.    Physical Exam   Temp: 98  F (36.7  C) Temp src: Oral BP: (!) 149/89 Pulse: 83   Resp: 16 SpO2: 98 % O2 Device: None (Room air)    Vitals:    12/19/21 2302   Weight: 108.9 kg (240 lb)     Vital Signs with Ranges  Temp:  [97.6  F (36.4  C)-98  F (36.7  C)] 98  F (36.7  C)  Pulse:  [76-87] 83  Resp:  [16] 16  BP: (132-149)/(89-96) 149/89  SpO2:  [96 %-98 %] 98 %  I/O last 3 completed shifts:  In: 363 [P.O.:360; I.V.:3]  Out: 1720 [Urine:1720]    Constitutional:   NAD, alert, calm, cooperative    Respiratory: No labored breathing, speaking in full sentences, auscultation deferred given COVID-19 status.  GI:  Abdomen soft, nontender and nondistended   Skin/Integumen:  Warm, dry,  non-diaphoretic.  Right lower extremity foot recently dressed/wrapped.  MSK: CMS x4 intact. Left foot dressed.  Neuro.  Gross motor tested, nonfocal, sensory intact  Psych oriented, affect calm     Medications       amLODIPine  5 mg Oral Daily     ampicillin-sulbactam (UNASYN) IV  3 g Intravenous Q6H     diclofenac  4 g Topical 4x Daily     enoxaparin ANTICOAGULANT  40 mg Subcutaneous Q12H     insulin aspart  1-10 Units Subcutaneous TID AC     insulin aspart  1-7 Units Subcutaneous At Bedtime     insulin glargine  18 Units Subcutaneous QAM AC     polyethylene glycol  17 g Oral Daily     senna-docusate  1 tablet Oral BID     sodium chloride (PF)  3 mL Intracatheter Q8H     vancomycin  1,500 mg Intravenous Q12H       Data   Recent Labs   Lab 12/25/21  0901 12/25/21  0734 12/25/21  0255 12/24/21  2120 12/24/21  0623 12/24/21  0606 12/22/21  0839 12/22/21  0727 12/21/21  1522 12/20/21  0857 12/20/21  0017   WBC  --   --   --   --   --  7.2  --  5.9 5.9  --  6.4   HGB  --   --   --   --   --  11.8*  --  12.1* 12.1*  --  13.8   MCV  --   --   --   --   --  86  --  84 85  --  83   PLT  --  465*  --   --   --  352  --  282 264  --  258   NA  --   --   --   --   --  138  --  133 131*  --  127*   POTASSIUM  --   --   --   --   --  3.8  --  3.9 4.2  --  4.2   CHLORIDE  --   --   --   --   --  105  --  100 98  --  90*   CO2  --   --   --   --   --  28  --  25 26  --  32   BUN  --   --   --   --   --  12  --  15 20  --  25   CR  --  0.90  --   --   --  0.90  --  1.03 1.25  --  1.32*   ANIONGAP  --   --   --   --   --  5  --  8 7  --  5   ELINOR  --   --   --   --   --  8.3*  --  8.2* 8.2*  --  8.8   GLC 97  --  106* 192*   < > 93   < > 177* 149*   < > 266*   ALBUMIN  --   --   --   --   --   --   --   --   --   --  2.5*   PROTTOTAL  --   --   --   --   --   --   --   --   --   --  8.5   BILITOTAL  --   --   --   --   --   --   --   --   --   --  0.5   ALKPHOS  --   --   --   --   --   --   --   --   --   --  97   ALT  --   --   --    --   --   --   --   --   --   --  19   AST  --   --   --   --   --   --   --   --   --   --  22    < > = values in this interval not displayed.

## 2021-12-25 NOTE — PHARMACY-VANCOMYCIN DOSING SERVICE
Pharmacy Vancomycin Note  Date of Service 2021  Patient's  1970   51 year old, male    Indication: Skin and Soft Tissue Infection  Day of Therapy: 5  Current vancomycin regimen:  1500 mg IV q12h  Current vancomycin monitoring method: AUC  Current vancomycin therapeutic monitoring goal: 400-600 mg*h/L    Insight[employer] Prediction of Current Vancomycin Regimen  Regimen: 1500 mg IV every 12 hours.  Start time: 15:15 on 2021  Exposure target: AUC24 (range)400-600 mg/L.hr   AUC24,ss: 441 mg/L.hr  Probability of AUC24 > 400: 75 %  Ctrough,ss: 12.3 mg/L  Probability of Ctrough,ss > 20: 0 %  Probability of nephrotoxicity (Lodise MARKO ): 8 %      Current estimated CrCl = Estimated Creatinine Clearance: 126.6 mL/min (based on SCr of 0.9 mg/dL).    Creatinine for last 3 days  2021:  6:06 AM Creatinine 0.90 mg/dL  2021:  7:34 AM Creatinine 0.90 mg/dL    Recent Vancomycin Levels (past 3 days)  2021: 12:56 PM Vancomycin 12.3 mg/L;  2:19 PM Vancomycin 11.5 mg/L  2021:  3:18 PM Vancomycin 11.5 mg/L    Vancomycin IV Administrations (past 72 hours)                   vancomycin 1500 mg in 0.9% NaCl 250 ml intermittent infusion 1,500 mg (mg) 1,500 mg New Bag 21 0348     1,500 mg New Bag 21 1542     1,500 mg New Bag  0313     1,500 mg New Bag 21 1526    vancomycin 1250 mg in 0.9% NaCl 250 mL intermittent infusion 1,250 mg (mg) 1,250 mg New Bag 21 0315                Nephrotoxins and other renal medications (From now, onward)    Start     Dose/Rate Route Frequency Ordered Stop    21 1500  vancomycin 1500 mg in 0.9% NaCl 250 ml intermittent infusion 1,500 mg         1,500 mg  over 90 Minutes Intravenous EVERY 12 HOURS 21 1432      21 1330  ampicillin-sulbactam (UNASYN) 3 g vial to attach to  mL bag         3 g  over 15-30 Minutes Intravenous EVERY 6 HOURS 21 1314      21 0896  ibuprofen (ADVIL/MOTRIN) tablet 400 mg         400 mg  Oral EVERY 6 HOURS PRN 12/20/21 0843               Contrast Orders - past 72 hours (72h ago, onward)            None          Interpretation of levels and current regimen:  Vancomycin level is reflective of -600    Has serum creatinine changed greater than 50% in last 72 hours: No    Urine output:  unable to determine    Renal Function: Stable    InsightRX Prediction of Planned New Vancomycin Regimen  Regimen: 1500 mg IV every 12 hours.  Start time: 15:15 on 12/25/2021  Exposure target: AUC24 (range)400-600 mg/L.hr   AUC24,ss: 441 mg/L.hr  Probability of AUC24 > 400: 75 %  Ctrough,ss: 12.3 mg/L  Probability of Ctrough,ss > 20: 0 %  Probability of nephrotoxicity (Lodise MARKO 2009): 8 %      Plan:  1. Continue Current Dose  2. Vancomycin monitoring method: AUC  3. Vancomycin therapeutic monitoring goal: 400-600 mg*h/L  4. Pharmacy will check vancomycin levels as appropriate in 1-3 Days.  5. Serum creatinine levels will be ordered daily for the first week of therapy and at least twice weekly for subsequent weeks.    Sohail Meehan [facility]

## 2021-12-25 NOTE — PHARMACY-VANCOMYCIN DOSING SERVICE
Pharmacy Vancomycin Note  Date of Service 2021  Patient's  1970   51 year old, male    Indication: Skin and Soft Tissue Infection  Day of Therapy: 6  Current vancomycin regimen:  1500 mg IV q12h  Current vancomycin monitoring method: AUC  Current vancomycin therapeutic monitoring goal: 400-600 mg*h/L    InsightRX Prediction of Current Vancomycin Regimen  Loading dose: N/A  Regimen: 1500 mg IV every 12 hours.  Start time: 03:48 on 2021  Exposure target: AUC24 (range)400-600 mg/L.hr   AUC24,ss: 454 mg/L.hr  Probability of AUC24 > 400: 56 %  Ctrough,ss: 11.2 mg/L  Probability of Ctrough,ss > 20: 0 %  Probability of nephrotoxicity (Lodise MARKO ): 7 %    Current estimated CrCl = Estimated Creatinine Clearance: 126.6 mL/min (based on SCr of 0.9 mg/dL).    Creatinine for last 3 days  2021:  6:06 AM Creatinine 0.90 mg/dL  2021:  7:34 AM Creatinine 0.90 mg/dL    Recent Vancomycin Levels (past 3 days)  2021: 12:56 PM Vancomycin 12.3 mg/L;  2:19 PM Vancomycin 11.5 mg/L  2021:  3:18 PM Vancomycin 11.5 mg/L    Vancomycin IV Administrations (past 72 hours)                   vancomycin 1500 mg in 0.9% NaCl 250 ml intermittent infusion 1,500 mg (mg) 1,500 mg New Bag 21 0348     1,500 mg New Bag 21 1542     1,500 mg New Bag  0313     1,500 mg New Bag 21 1526    vancomycin 1250 mg in 0.9% NaCl 250 mL intermittent infusion 1,250 mg (mg) 1,250 mg New Bag 21 0315                Nephrotoxins and other renal medications (From now, onward)    Start     Dose/Rate Route Frequency Ordered Stop    21 1700  vancomycin (VANCOCIN) 1,750 mg in sodium chloride 0.9 % 500 mL intermittent infusion         1,750 mg  over 2 Hours Intravenous EVERY 12 HOURS 21 1632      21 1330  ampicillin-sulbactam (UNASYN) 3 g vial to attach to  mL bag         3 g  over 15-30 Minutes Intravenous EVERY 6 HOURS 21 1314      21 0810  ibuprofen (ADVIL/MOTRIN)  tablet 400 mg         400 mg Oral EVERY 6 HOURS PRN 12/20/21 0843               Contrast Orders - past 72 hours (72h ago, onward)            None          Interpretation of levels and current regimen:  Vancomycin level is reflective of -600. Only 56% PAUC.     Has serum creatinine changed greater than 50% in last 72 hours: No    Urine output:  good urine output    Renal Function: Improving    InsightRX Prediction of Planned New Vancomycin Regimen  Loading dose: N/A  Regimen: 1750 mg IV every 12 hours.  Start time: 03:48 on 12/25/2021  Exposure target: AUC24 (range)400-600 mg/L.hr   AUC24,ss: 477 mg/L.hr  Probability of AUC24 > 400: 88 %  Ctrough,ss: 13.1 mg/L  Probability of Ctrough,ss > 20: 1 %  Probability of nephrotoxicity (Lodise MARKO 2009): 8 %    Plan:  1. Increase Dose to 1750 mg q12h  2. Vancomycin monitoring method: AUC  3. Vancomycin therapeutic monitoring goal: 400-600 mg*h/L  4. Pharmacy will check vancomycin levels as appropriate in 1-3 Days.  5. Serum creatinine levels will be ordered daily for the first week of therapy and at least twice weekly for subsequent weeks.    Karla Liz [facility]

## 2021-12-25 NOTE — PLAN OF CARE
MD Notification     Notified Person: Hospitalist      Notified Person Name: Kian Elias      Notification Date/Time: 10:14 PM 12/24     Notification Interaction: phone call     Purpose of Notification: FYI wound culture positive for staphylococcus aureus MRSA . Pt already receiving IV unasyn and vanco      Orders Received: none     Comments: n/a

## 2021-12-25 NOTE — PLAN OF CARE
Shift: 1900-0730    Orientation: A&Ox4   Activity: Ax1/GB/walker, heel WBing on RLE with off-loading shoe   Vitals Signs: VSS on RA   Isolation Precautions: Special and contact precautions maintained   Neuro/CMS: Baseline neuropathy LE and fingers. CMS intact, pulses palpable, UTV R foot wound and incision. Dressing CDI   Cardiac: WDL  Respiratory: Productive cough, LS diminished  GI/: Voiding adequately, BM x1, BG checks stable   Diet/Appetite: Mod carb diet  Pain: Pain managed with scheduled tylenol po and prn oxy po  Skin: WDL   Lines: R PIV SL   Consults/Imaging: SW following   Labs: Monitor reatinine   Discharge Plan: Pending medical stabilization and placement.    Other: POD3

## 2021-12-25 NOTE — PLAN OF CARE
"Neuro: AOx4 calm cooperative   Cardiac: Regular rate and rhythm  Resp: on RA   GI: Bowel sounds active, no BM during shift, complains of intermittent nasuea  : Voiding adequate amounts in urinal  CMS: Intact, pulses palpable, RUPERTO under R foot dressing.  Mobility: Up A1 with foot, R heel weight bearing  Pain: complains of myalgia, tylenol given during shift, oxycodone given during shift  Diet: mod carb   Plan: continue IV abx- need to make discharge plan  Other Important Info: Foot dressing change today    BP (!) 132/91 (BP Location: Left arm)   Pulse 87   Temp 98  F (36.7  C)   Resp 16   Ht 1.905 m (6' 3\")   Wt 108.9 kg (240 lb)   SpO2 96%   BMI 30.00 kg/m       "

## 2021-12-25 NOTE — PLAN OF CARE
"Neuro: Aox4, calm cooperative   Cardiac: regular rate and rhythm  Resp: on RA  GI: Bowel sounds active, no bm during shift, some nausea, zofran given x1  : Voiding adequate amounts in urinal  CMS: Intact, pulses palpable  Mobility: Up A1gb walker  Pain: Denies, complains of generalized discomfort but did not want medication  Diet: Mod carb  ABNL Labs/BG: BG within normal range throughout shift   Plan: continue IV abx, needs safe disposition plan  Other Important Info: Dressing change done today x1    BP (!) 149/89 (BP Location: Left arm)   Pulse 83   Temp 98  F (36.7  C) (Oral)   Resp 16   Ht 1.905 m (6' 3\")   Wt 108.9 kg (240 lb)   SpO2 98%   BMI 30.00 kg/m       "

## 2021-12-26 LAB
BACTERIA WND CULT: ABNORMAL
CREAT SERPL-MCNC: 0.88 MG/DL (ref 0.66–1.25)
GFR SERPL CREATININE-BSD FRML MDRD: >90 ML/MIN/1.73M2
GLUCOSE BLDC GLUCOMTR-MCNC: 114 MG/DL (ref 70–99)
GLUCOSE BLDC GLUCOMTR-MCNC: 120 MG/DL (ref 70–99)
GLUCOSE BLDC GLUCOMTR-MCNC: 132 MG/DL (ref 70–99)
GLUCOSE BLDC GLUCOMTR-MCNC: 186 MG/DL (ref 70–99)
GLUCOSE BLDC GLUCOMTR-MCNC: 213 MG/DL (ref 70–99)

## 2021-12-26 PROCEDURE — 250N000011 HC RX IP 250 OP 636: Performed by: INTERNAL MEDICINE

## 2021-12-26 PROCEDURE — 120N000004 HC R&B MS OVERFLOW

## 2021-12-26 PROCEDURE — 250N000013 HC RX MED GY IP 250 OP 250 PS 637: Performed by: PODIATRIST

## 2021-12-26 PROCEDURE — 250N000013 HC RX MED GY IP 250 OP 250 PS 637: Performed by: HOSPITALIST

## 2021-12-26 PROCEDURE — 36415 COLL VENOUS BLD VENIPUNCTURE: CPT | Performed by: INTERNAL MEDICINE

## 2021-12-26 PROCEDURE — 258N000003 HC RX IP 258 OP 636: Performed by: INTERNAL MEDICINE

## 2021-12-26 PROCEDURE — 82565 ASSAY OF CREATININE: CPT | Performed by: INTERNAL MEDICINE

## 2021-12-26 PROCEDURE — 250N000011 HC RX IP 250 OP 636: Performed by: PODIATRIST

## 2021-12-26 PROCEDURE — 99232 SBSQ HOSP IP/OBS MODERATE 35: CPT | Performed by: HOSPITALIST

## 2021-12-26 RX ADMIN — ENOXAPARIN SODIUM 40 MG: 40 INJECTION SUBCUTANEOUS at 11:05

## 2021-12-26 RX ADMIN — SENNOSIDES AND DOCUSATE SODIUM 1 TABLET: 50; 8.6 TABLET ORAL at 08:01

## 2021-12-26 RX ADMIN — ACETAMINOPHEN 650 MG: 325 TABLET, FILM COATED ORAL at 08:37

## 2021-12-26 RX ADMIN — AMPICILLIN SODIUM AND SULBACTAM SODIUM 3 G: 2; 1 INJECTION, POWDER, FOR SOLUTION INTRAMUSCULAR; INTRAVENOUS at 20:02

## 2021-12-26 RX ADMIN — GUAIFENESIN 10 ML: 200 SOLUTION ORAL at 22:32

## 2021-12-26 RX ADMIN — OXYCODONE HYDROCHLORIDE 5 MG: 5 TABLET ORAL at 15:50

## 2021-12-26 RX ADMIN — ENOXAPARIN SODIUM 40 MG: 40 INJECTION SUBCUTANEOUS at 22:32

## 2021-12-26 RX ADMIN — AMLODIPINE BESYLATE 5 MG: 5 TABLET ORAL at 08:01

## 2021-12-26 RX ADMIN — AMPICILLIN SODIUM AND SULBACTAM SODIUM 3 G: 2; 1 INJECTION, POWDER, FOR SOLUTION INTRAMUSCULAR; INTRAVENOUS at 08:01

## 2021-12-26 RX ADMIN — ACETAMINOPHEN 650 MG: 325 TABLET, FILM COATED ORAL at 15:50

## 2021-12-26 RX ADMIN — SENNOSIDES AND DOCUSATE SODIUM 1 TABLET: 50; 8.6 TABLET ORAL at 20:08

## 2021-12-26 RX ADMIN — AMPICILLIN SODIUM AND SULBACTAM SODIUM 3 G: 2; 1 INJECTION, POWDER, FOR SOLUTION INTRAMUSCULAR; INTRAVENOUS at 02:38

## 2021-12-26 RX ADMIN — DICLOFENAC SODIUM 4 G: 10 GEL TOPICAL at 11:05

## 2021-12-26 RX ADMIN — DICLOFENAC SODIUM 4 G: 10 GEL TOPICAL at 20:08

## 2021-12-26 RX ADMIN — INSULIN ASPART 2 UNITS: 100 INJECTION, SOLUTION INTRAVENOUS; SUBCUTANEOUS at 16:52

## 2021-12-26 RX ADMIN — ACETAMINOPHEN 650 MG: 325 TABLET, FILM COATED ORAL at 22:32

## 2021-12-26 RX ADMIN — OXYCODONE HYDROCHLORIDE 5 MG: 5 TABLET ORAL at 22:32

## 2021-12-26 RX ADMIN — IBUPROFEN 400 MG: 400 TABLET ORAL at 11:05

## 2021-12-26 RX ADMIN — VANCOMYCIN HYDROCHLORIDE 1750 MG: 1 INJECTION, POWDER, LYOPHILIZED, FOR SOLUTION INTRAVENOUS at 16:57

## 2021-12-26 RX ADMIN — AMPICILLIN SODIUM AND SULBACTAM SODIUM 3 G: 2; 1 INJECTION, POWDER, FOR SOLUTION INTRAMUSCULAR; INTRAVENOUS at 15:01

## 2021-12-26 RX ADMIN — GUAIFENESIN 10 ML: 200 SOLUTION ORAL at 08:38

## 2021-12-26 RX ADMIN — OXYCODONE HYDROCHLORIDE 5 MG: 5 TABLET ORAL at 08:38

## 2021-12-26 RX ADMIN — DICLOFENAC SODIUM 4 G: 10 GEL TOPICAL at 08:02

## 2021-12-26 RX ADMIN — VANCOMYCIN HYDROCHLORIDE 1750 MG: 1 INJECTION, POWDER, LYOPHILIZED, FOR SOLUTION INTRAVENOUS at 05:17

## 2021-12-26 ASSESSMENT — ACTIVITIES OF DAILY LIVING (ADL)
ADLS_ACUITY_SCORE: 9

## 2021-12-26 NOTE — PLAN OF CARE
Covid precautions maintained. A/Ox4. VSS on RA. POD4. Denies pain/SOB. CMS intact. Right foot dressing CDI. Ax1 w/ GB&W and WBAT on right foot. Infrequent productive cough. Cares clustered overnight. Urinal at bedside, voiding WNL. PIV infusing ABX. discharge pending ABX and placement TCU/shelter.

## 2021-12-26 NOTE — PROGRESS NOTES
[facility]    Hospitalist Progress Note    Date of Service (when I saw the patient): 12/26/2021    Assessment & Plan   Chris Shea is a 51 year old male with homelessness, uncontrolled diabetes with peripheral vascular disease, neuropathy, nephropathy, CKD stage II, chronic bilateral foot wounds, methamphetamine abuse, h/o tobacco abuse who presented on 12/19/2021 with chornic wound/ulcer R foot.   Incidental Covid-19+ testing in an unvaccinated patient.     Right foot cellulitis with tenosynovitis and abscess  Chronic right mid foot ulcer  Status post right foot ulcer debridement, 12/22/2021  Afebrile, normal WBC, no hypotension. Lactate 1.0. [place] [facility] [place] [facility] but has not received regular cares. Underwent excision and debridement of this foot ulcer 12/20/21.   MRI foot concerning for tenosynovitis of flexor digitorum longus with possible early osteo vs stress reaction as well.   CRP is 216, procal 0.19.   --Currently on Unasyn and vancomycin per ID  --Podiatry consulted. S/p right foot ulcer debridement on 12/22.    --DH2 offloading shoe for ambulation; heel WB  --PO abx course at discharge per ID Augmentin and Bactrim for two more weeks if BCs continue negative.  --Dr Neely has placed foot-specific discharge instructions.  Podiatry has signed off.  --PT consulted.  --Obstacles to discharge is homelessness and Covid positive status.  Although generally asymptomatic with Covid he was diagnosed on 12/20/2021 with first date able to identify as recovered status 12/30/2021.  SW following, have referrals to [facility] TCU allowing positive Covid patients and higher ground homeless shelter.  -Discussion with patient today 12/25/2021, patient is receptive to TCU or homeless shelter as he knows his homelessness status is obstacle for recovery of both regards to his wounds and general status.  -Wound culture from 12/22/2021, MRSA, group B strep, cornybacterium with  sensitivities, will review with ID regarding antibiotics Rx and transition to p.o. on discharge.  BC no growth.     COVID-19 infection in unvaccinated patient  Incidental positive findings on screening lab. Denies dyspnea but reports occasional cough and chest pain worse with deep breathing.   He has remained afebrile and has oxygen saturation stable on room air.   CXR on admission is negative.   CRP is 216 (difficult to interpret given above infection) and D dimer 2.24.   --Surveillance COVID-19 PCR test result => POSITIVE  --Covid precautions  --No indication for treatment with steroids nor remdesivir as he does not meet criteria at present  --CT chest obtained to rule out PE given reports of chest pain today and significant d dimer elevation.  No evidence of pulmonary embolism.  Pulmonary findings typical of COVID-19 pneumonia.      Poorly controlled diabetes  Neuropathy, Peripheral Vascular Disease and Nephropathy secondary to diabetes  Pseudohyponatremia  CKD stage II  Prior admission was discharged with diabetes supplies and lantus with premeal aspart. He is not taking these.  A1c 9.7 this admission.   --consistent carb diet  --continue lantus 20 units in AM   --corrective dose aspart  -Due to tight glucoses decrease Lantus to 20 units.  Glucoses WNL.  Monitor.    HERNAN.   Cr on admission elevated at 1.3 from baseline 1-1.2. suspect hypovolemia.   --Last BMP 12/24/2021 normal.  Patient generally refuses lab draws.     Hyponatremia, resolved.  Na 127 on admission. Suspect hypovolemia.   --Latest sodium 12/24/2021 138.     Cold exposure left foot  Cold left foot attributed wet boot and cold exposure in homeless patient. Lower extremity doppler with no evidence for high grade stenosis or occlusion of the left lower extremity arterial system. There were triphasic waveforms throughout the left lower extremity arterial system.   -no further evaluation needed, strongly advised to DC tobacco.     Methamphetamine  Abuse  Noted. UDS positive this admission.      DVT Prophylaxis: Enoxaparin (Lovenox) subcutaneous- held in anticipation of surgery.   No further surgeries planned, in view of positive Covid status restart.  Code Status: Full Code     Disposition:  TBD pending clinical improvement, safe discharge, patient homeless presenting severe obstacles regarding compliance to wound healing, infection and abx use, high risk for recurrent hospitalization. SW assisting with discharge planning including TCU and/or homeless shelter.     Lele Westfall     I spent 25 minutes on the unit/floor in management of care today reviewing labs, medications, interdisciplinary notes; and completing documentation of encounter and orders with over 50% of my time wasCoordinating Care and plan with Nursing and Specialists,ID regarding abx RX management and surveillance     Interval History   Patient receptive to TCU homeless shelter on discharge.  Reports no wound issues, afebrile.   -Data reviewed today: I reviewed all new labs and imaging results over the last 24 hours.    Physical Exam   Temp: 98.3  F (36.8  C) Temp src: Oral BP: (!) 142/90 Pulse: 82   Resp: 18 SpO2: 99 % O2 Device: None (Room air)    Vitals:    12/19/21 2302   Weight: 108.9 kg (240 lb)     Vital Signs with Ranges  Temp:  [98.3  F (36.8  C)-98.4  F (36.9  C)] 98.3  F (36.8  C)  Pulse:  [82-86] 82  Resp:  [16-18] 18  BP: (141-146)/(90-91) 142/90  SpO2:  [94 %-99 %] 99 %  I/O last 3 completed shifts:  In: 360 [P.O.:360]  Out: 2650 [Urine:2650]    Constitutional:   NAD, alert, calm, cooperative    Respiratory: No labored breathing, speaking in full sentences, auscultation deferred given COVID-19 status.  GI:  Abdomen soft, nontender and nondistended   Skin/Integumen:  Warm, dry, non-diaphoretic.  Right lower extremity foot recently dressed/wrapped.  MSK: CMS x4 intact. Left foot dressed.  Neuro.  Gross motor tested, nonfocal, sensory intact  Psych oriented, affect calm      Medications       amLODIPine  5 mg Oral Daily     ampicillin-sulbactam (UNASYN) IV  3 g Intravenous Q6H     diclofenac  4 g Topical 4x Daily     enoxaparin ANTICOAGULANT  40 mg Subcutaneous Q12H     insulin aspart  1-10 Units Subcutaneous TID AC     insulin aspart  1-7 Units Subcutaneous At Bedtime     insulin glargine  18 Units Subcutaneous QAM AC     polyethylene glycol  17 g Oral Daily     senna-docusate  1 tablet Oral BID     sodium chloride (PF)  3 mL Intracatheter Q8H     vancomycin  1,750 mg Intravenous Q12H       Data   Recent Labs   Lab 12/26/21  1104 12/26/21  0759 12/26/21  0556 12/26/21  0234 12/25/21  0901 12/25/21  0734 12/24/21  0623 12/24/21  0606 12/22/21  0839 12/22/21  0727 12/21/21  1522 12/20/21  0857 12/20/21  0017   WBC  --   --   --   --   --   --   --  7.2  --  5.9 5.9  --  6.4   HGB  --   --   --   --   --   --   --  11.8*  --  12.1* 12.1*  --  13.8   MCV  --   --   --   --   --   --   --  86  --  84 85  --  83   PLT  --   --   --   --   --  465*  --  352  --  282 264  --  258   NA  --   --   --   --   --   --   --  138  --  133 131*  --  127*   POTASSIUM  --   --   --   --   --   --   --  3.8  --  3.9 4.2  --  4.2   CHLORIDE  --   --   --   --   --   --   --  105  --  100 98  --  90*   CO2  --   --   --   --   --   --   --  28  --  25 26  --  32   BUN  --   --   --   --   --   --   --  12  --  15 20  --  25   CR  --   --  0.88  --   --  0.90  --  0.90  --  1.03 1.25  --  1.32*   ANIONGAP  --   --   --   --   --   --   --  5  --  8 7  --  5   ELINOR  --   --   --   --   --   --   --  8.3*  --  8.2* 8.2*  --  8.8   * 114*  --  132*   < >  --    < > 93   < > 177* 149*   < > 266*   ALBUMIN  --   --   --   --   --   --   --   --   --   --   --   --  2.5*   PROTTOTAL  --   --   --   --   --   --   --   --   --   --   --   --  8.5   BILITOTAL  --   --   --   --   --   --   --   --   --   --   --   --  0.5   ALKPHOS  --   --   --   --   --   --   --   --   --   --   --   --  97   ALT  --    --   --   --   --   --   --   --   --   --   --   --  19   AST  --   --   --   --   --   --   --   --   --   --   --   --  22    < > = values in this interval not displayed.

## 2021-12-26 NOTE — PROVIDER NOTIFICATION
MD Notification    Notified Person: MD    Notified Person Name: Malou    Notification Date/Time: 12/26/21, 0952    Notification Interaction: Web-based paging     Purpose of Notification: FYI final cultures from right foot wound resulted.     Orders Received:    Comments:

## 2021-12-26 NOTE — PLAN OF CARE
COVID. POD3 I&D of R foot - dressing changed CDI. CMS intact. Aox4. VSS on RA. Mod CHO. Ax1 w/GB and W - WBaT. PRN oxy, robitussin, and zofran x1. PIV SL. Urinal @bedside. +flatus +BS -BM. Cont. Discharge pending abx course and TCU placement.

## 2021-12-27 ENCOUNTER — APPOINTMENT (OUTPATIENT)
Dept: PHYSICAL THERAPY | Facility: CLINIC | Age: 51
End: 2021-12-27
Payer: COMMERCIAL

## 2021-12-27 LAB
CREAT SERPL-MCNC: 0.83 MG/DL (ref 0.66–1.25)
GFR SERPL CREATININE-BSD FRML MDRD: >90 ML/MIN/1.73M2
GLUCOSE BLDC GLUCOMTR-MCNC: 107 MG/DL (ref 70–99)
GLUCOSE BLDC GLUCOMTR-MCNC: 113 MG/DL (ref 70–99)
GLUCOSE BLDC GLUCOMTR-MCNC: 135 MG/DL (ref 70–99)
GLUCOSE BLDC GLUCOMTR-MCNC: 146 MG/DL (ref 70–99)
GLUCOSE BLDC GLUCOMTR-MCNC: 152 MG/DL (ref 70–99)
VANCOMYCIN SERPL-MCNC: 14.2 MG/L

## 2021-12-27 PROCEDURE — 250N000013 HC RX MED GY IP 250 OP 250 PS 637: Performed by: PODIATRIST

## 2021-12-27 PROCEDURE — 0JBQ0ZZ EXCISION OF RIGHT FOOT SUBCUTANEOUS TISSUE AND FASCIA, OPEN APPROACH: ICD-10-PCS | Performed by: PODIATRIST

## 2021-12-27 PROCEDURE — 97530 THERAPEUTIC ACTIVITIES: CPT | Mod: GP

## 2021-12-27 PROCEDURE — 120N000004 HC R&B MS OVERFLOW

## 2021-12-27 PROCEDURE — 11042 DBRDMT SUBQ TIS 1ST 20SQCM/<: CPT | Performed by: PODIATRIST

## 2021-12-27 PROCEDURE — 97116 GAIT TRAINING THERAPY: CPT | Mod: GP

## 2021-12-27 PROCEDURE — 250N000013 HC RX MED GY IP 250 OP 250 PS 637: Performed by: HOSPITALIST

## 2021-12-27 PROCEDURE — 258N000003 HC RX IP 258 OP 636: Performed by: INTERNAL MEDICINE

## 2021-12-27 PROCEDURE — 36415 COLL VENOUS BLD VENIPUNCTURE: CPT | Performed by: INTERNAL MEDICINE

## 2021-12-27 PROCEDURE — 250N000011 HC RX IP 250 OP 636: Performed by: PODIATRIST

## 2021-12-27 PROCEDURE — 82565 ASSAY OF CREATININE: CPT | Performed by: INTERNAL MEDICINE

## 2021-12-27 PROCEDURE — 99232 SBSQ HOSP IP/OBS MODERATE 35: CPT | Performed by: HOSPITALIST

## 2021-12-27 PROCEDURE — 250N000011 HC RX IP 250 OP 636: Performed by: INTERNAL MEDICINE

## 2021-12-27 PROCEDURE — 80202 ASSAY OF VANCOMYCIN: CPT | Performed by: INTERNAL MEDICINE

## 2021-12-27 PROCEDURE — 99232 SBSQ HOSP IP/OBS MODERATE 35: CPT | Mod: 25 | Performed by: PODIATRIST

## 2021-12-27 RX ORDER — AMLODIPINE BESYLATE 10 MG/1
10 TABLET ORAL DAILY
Status: DISCONTINUED | OUTPATIENT
Start: 2021-12-27 | End: 2022-01-02 | Stop reason: HOSPADM

## 2021-12-27 RX ORDER — AMLODIPINE BESYLATE 5 MG/1
5 TABLET ORAL ONCE
Status: DISCONTINUED | OUTPATIENT
Start: 2021-12-27 | End: 2021-12-27 | Stop reason: ALTCHOICE

## 2021-12-27 RX ADMIN — INSULIN ASPART 1 UNITS: 100 INJECTION, SOLUTION INTRAVENOUS; SUBCUTANEOUS at 18:06

## 2021-12-27 RX ADMIN — IBUPROFEN 400 MG: 400 TABLET ORAL at 15:49

## 2021-12-27 RX ADMIN — IBUPROFEN 400 MG: 400 TABLET ORAL at 22:23

## 2021-12-27 RX ADMIN — OXYCODONE HYDROCHLORIDE 5 MG: 5 TABLET ORAL at 15:49

## 2021-12-27 RX ADMIN — ENOXAPARIN SODIUM 40 MG: 40 INJECTION SUBCUTANEOUS at 11:02

## 2021-12-27 RX ADMIN — GUAIFENESIN 10 ML: 200 SOLUTION ORAL at 21:10

## 2021-12-27 RX ADMIN — AMLODIPINE BESYLATE 10 MG: 10 TABLET ORAL at 08:45

## 2021-12-27 RX ADMIN — ENOXAPARIN SODIUM 40 MG: 40 INJECTION SUBCUTANEOUS at 21:10

## 2021-12-27 RX ADMIN — ONDANSETRON 4 MG: 4 TABLET, ORALLY DISINTEGRATING ORAL at 20:28

## 2021-12-27 RX ADMIN — IBUPROFEN 400 MG: 400 TABLET ORAL at 08:45

## 2021-12-27 RX ADMIN — AMPICILLIN SODIUM AND SULBACTAM SODIUM 3 G: 2; 1 INJECTION, POWDER, FOR SOLUTION INTRAMUSCULAR; INTRAVENOUS at 08:46

## 2021-12-27 RX ADMIN — OXYCODONE HYDROCHLORIDE 5 MG: 5 TABLET ORAL at 22:23

## 2021-12-27 RX ADMIN — ACETAMINOPHEN 650 MG: 325 TABLET, FILM COATED ORAL at 20:28

## 2021-12-27 RX ADMIN — SENNOSIDES AND DOCUSATE SODIUM 1 TABLET: 50; 8.6 TABLET ORAL at 08:45

## 2021-12-27 RX ADMIN — AMPICILLIN SODIUM AND SULBACTAM SODIUM 3 G: 2; 1 INJECTION, POWDER, FOR SOLUTION INTRAMUSCULAR; INTRAVENOUS at 14:56

## 2021-12-27 RX ADMIN — OXYCODONE HYDROCHLORIDE 5 MG: 5 TABLET ORAL at 08:46

## 2021-12-27 RX ADMIN — POLYETHYLENE GLYCOL 3350 17 G: 17 POWDER, FOR SOLUTION ORAL at 08:46

## 2021-12-27 RX ADMIN — SENNOSIDES AND DOCUSATE SODIUM 1 TABLET: 50; 8.6 TABLET ORAL at 20:28

## 2021-12-27 RX ADMIN — AMPICILLIN SODIUM AND SULBACTAM SODIUM 3 G: 2; 1 INJECTION, POWDER, FOR SOLUTION INTRAMUSCULAR; INTRAVENOUS at 02:14

## 2021-12-27 RX ADMIN — VANCOMYCIN HYDROCHLORIDE 1750 MG: 1 INJECTION, POWDER, LYOPHILIZED, FOR SOLUTION INTRAVENOUS at 04:13

## 2021-12-27 RX ADMIN — AMPICILLIN SODIUM AND SULBACTAM SODIUM 3 G: 2; 1 INJECTION, POWDER, FOR SOLUTION INTRAMUSCULAR; INTRAVENOUS at 20:29

## 2021-12-27 RX ADMIN — VANCOMYCIN HYDROCHLORIDE 1750 MG: 1 INJECTION, POWDER, LYOPHILIZED, FOR SOLUTION INTRAVENOUS at 17:56

## 2021-12-27 ASSESSMENT — ACTIVITIES OF DAILY LIVING (ADL)
ADLS_ACUITY_SCORE: 9

## 2021-12-27 NOTE — PROVIDER NOTIFICATION
MD Notification    Notified Person: MD    Notified Person Name: Melonie Hurtado MD    Notification Date/Time: 12/27/21; 1510    Notification Interaction: phone page    Purpose of Notification: verify weight bearing status. Previously was heal weightbearing, but new notes states non weightbearing.     Orders Received: ok for minimal heal weight bearing to RLE per Dr. Hurtado    Comments:

## 2021-12-27 NOTE — PROGRESS NOTES
A&O. Indep, w. WBAT. Pain in R and LLE. RLE CDI tx tylenol and oxycodone. . Robitussin tx infreq cough, RA. Baseline decreased sensation feet. Mod carb. Discharge to TCU or homeless shelter pending placement.

## 2021-12-27 NOTE — CONSULTS
PATIENT HISTORY:  Chris Shea is a 51 year old male who was admitted for foot infection.      I was asked to see Chris Shea  by  for reassessment of right foot.    Patient was seen at bedside. Underwent I&D right foot with Dr. Neely 6 days ago.  reconsulted for concern of more drainage from incision.  He notes that the dressing has not been changed till today. Concerned for re infection. Denies fever, chills, nausa.      Review of Systems:  Patient denies fever, chills, rash, wound, stiffness, limping,  weakness, heart burn, blood in stool, chest pain with activity, calf pain when walking, shortness of breath with activity, chronic cough, easy bleeding/bruising, swelling of ankles, excessive thirst, fatigue, depression, anxiety.  Patient admits to numbness.     PAST MEDICAL HISTORY:   Past Medical History:   Diagnosis Date     Methamphetamine abuse (H)      Right foot ulcer (H)      Type 2 diabetes mellitus with diabetic peripheral angiopathy without gangrene, without long-term current use of insulin (H)         PAST SURGICAL HISTORY:   Past Surgical History:   Procedure Laterality Date     INCISION AND DRAINAGE NECK, COMBINED N/A 9/14/2021    Procedure: INCISION AND DRAINAGE, POSTERIOR NECK;  Surgeon: Lucina Dodson MD;  Location:  OR     IRRIGATION AND DEBRIDEMENT FOOT, COMBINED Right 12/22/2021    Procedure: IRRIGATION AND DEBRIDEMENT, RIGHT FOOT;  Surgeon: Michelle Neely DPM;  Location:  OR        MEDICATIONS:   Current Facility-Administered Medications:      [DISCONTINUED] acetaminophen (TYLENOL) tablet 650 mg, 650 mg, Oral, Q6H PRN, 650 mg at 12/20/21 0936 **OR** acetaminophen (TYLENOL) Suppository 650 mg, 650 mg, Rectal, Q6H PRN, Michelle Neely DPM     acetaminophen (TYLENOL) tablet 650 mg, 650 mg, Oral, Q4H PRN, Michelle Neely DPM, 650 mg at 12/26/21 5198     amLODIPine (NORVASC) tablet 10 mg, 10 mg, Oral, Daily, Lele Westfall MD, 10 mg at 12/27/21 1983      ampicillin-sulbactam (UNASYN) 3 g vial to attach to  mL bag, 3 g, Intravenous, Q6H, Michelle Neely R, DPM, 3 g at 12/27/21 0846     bisacodyl (DULCOLAX) Suppository 10 mg, 10 mg, Rectal, Daily PRN, CadRenae amezquitaen R, DPM     glucose gel 15-30 g, 15-30 g, Oral, Q15 Min PRN **OR** dextrose 50 % injection 25-50 mL, 25-50 mL, Intravenous, Q15 Min PRN **OR** glucagon injection 1 mg, 1 mg, Subcutaneous, Q15 Min PRN, Renae Neelyen R, DPM     diclofenac (VOLTAREN) 1 % topical gel 4 g, 4 g, Topical, 4x Daily, CadieuxRenaeen R, DPM, 4 g at 12/26/21 2008     enoxaparin ANTICOAGULANT (LOVENOX) injection 40 mg, 40 mg, Subcutaneous, Q12H, Nichole Sood MD, 40 mg at 12/27/21 1102     guaiFENesin (ROBITUSSIN) 20 mg/mL solution 10 mL, 10 mL, Oral, Q4H PRN, Michelle Neely DPM, 10 mL at 12/26/21 2232     ibuprofen (ADVIL/MOTRIN) tablet 400 mg, 400 mg, Oral, Q6H PRN, CadKal amezquitaisten R, DPM, 400 mg at 12/27/21 0845     insulin aspart (NovoLOG) injection (RAPID ACTING), 1-10 Units, Subcutaneous, TID AC, Michelle Neely DPM, 2 Units at 12/26/21 1652     insulin aspart (NovoLOG) injection (RAPID ACTING), 1-7 Units, Subcutaneous, At Bedtime, Michelle Neely DPM, 1 Units at 12/26/21 2235     insulin glargine (LANTUS PEN) injection 18 Units, 18 Units, Subcutaneous, QAM DIANNA, Lele Westfall MD, 18 Units at 12/27/21 0849     lidocaine (LMX4) cream, , Topical, Q1H PRN, Cadieux, Michelle R, DPM     lidocaine 1 % 0.1-1 mL, 0.1-1 mL, Other, Q1H PRN, Michelle Neely DPM     magnesium hydroxide (MILK OF MAGNESIA) suspension 30 mL, 30 mL, Oral, Daily PRN, Michelle Neely DPM     melatonin tablet 5 mg, 5 mg, Oral, At Bedtime PRN, Michelle Neely DPM, 5 mg at 12/25/21 2042     naloxone (NARCAN) injection 0.2 mg, 0.2 mg, Intravenous, Q2 Min PRN **OR** naloxone (NARCAN) injection 0.4 mg, 0.4 mg, Intravenous, Q2 Min PRN **OR** naloxone (NARCAN) injection 0.2 mg, 0.2 mg, Intramuscular, Q2 Min PRN **OR**  naloxone (NARCAN) injection 0.4 mg, 0.4 mg, Intramuscular, Q2 Min PRN, Cadieux, Michelle R, DPM     ondansetron (ZOFRAN-ODT) ODT tab 4 mg, 4 mg, Oral, Q6H PRN, 4 mg at 12/25/21 1718 **OR** ondansetron (ZOFRAN) injection 4 mg, 4 mg, Intravenous, Q6H PRN, Cadieux, Michelle R, DPM, 4 mg at 12/24/21 0914     oxyCODONE (ROXICODONE) tablet 5 mg, 5 mg, Oral, Q6H PRN, Cadieux, Michelle R, DPM, 5 mg at 12/27/21 0846     polyethylene glycol (MIRALAX) Packet 17 g, 17 g, Oral, Daily, Cadieux, Michelle R, DPM, 17 g at 12/27/21 0846     prochlorperazine (COMPAZINE) injection 10 mg, 10 mg, Intravenous, Q6H PRN **OR** prochlorperazine (COMPAZINE) tablet 10 mg, 10 mg, Oral, Q6H PRN, Cadieux, Michelle R, DPM     senna-docusate (SENOKOT-S/PERICOLACE) 8.6-50 MG per tablet 1 tablet, 1 tablet, Oral, BID, Cadieux, Michelle R, DPM, 1 tablet at 12/27/21 0845     sodium chloride (PF) 0.9% PF flush 3 mL, 3 mL, Intracatheter, Q8H, Cadieux, Michelle R, DPM, 3 mL at 12/27/21 0623     sodium chloride (PF) 0.9% PF flush 3 mL, 3 mL, Intracatheter, q1 min prn, Cadieux, Michelle R, DPM, 3 mL at 12/25/21 2043     vancomycin (VANCOCIN) 1,750 mg in sodium chloride 0.9 % 500 mL intermittent infusion, 1,750 mg, Intravenous, Q12H, Nichole Sood MD, 1,750 mg at 12/27/21 0413     ALLERGIES:  No Known Allergies     SOCIAL HISTORY:   Social History     Socioeconomic History     Marital status: Single     Spouse name: Not on file     Number of children: Not on file     Years of education: Not on file     Highest education level: Not on file   Occupational History     Not on file   Tobacco Use     Smoking status: Never Smoker     Smokeless tobacco: Not on file   Substance and Sexual Activity     Alcohol use: No     Drug use: No     Sexual activity: Never   Other Topics Concern     Parent/sibling w/ CABG, MI or angioplasty before 65F 55M? Not Asked   Social History Narrative     Not on file     Social Determinants of Health     Financial Resource Strain: Not  "on file   Food Insecurity: Not on file   Transportation Needs: Not on file   Physical Activity: Not on file   Stress: Not on file   Social Connections: Not on file   Intimate Partner Violence: Not on file   Housing Stability: Not on file        FAMILY HISTORY: No family history on file.     EXAM:Vitals: BP (!) 141/97 (BP Location: Right arm)   Pulse 80   Temp 97.7  F (36.5  C) (Axillary)   Resp 16   Ht 1.905 m (6' 3\")   Wt 108.9 kg (240 lb)   SpO2 97%   BMI 30.00 kg/m    BMI= Body mass index is 30 kg/m .    LABS:    WBC Count   Date Value Ref Range Status   12/24/2021 7.2 4.0 - 11.0 10e3/uL Final         HA1C:   Hemoglobin A1C   Date Value Ref Range Status   12/20/2021 9.7 (H) 0.0 - 5.6 % Final     Comment:     Normal <5.7%   Prediabetes 5.7-6.4%    Diabetes 6.5% or higher     Note: Adopted from ADA consensus guidelines.     General appearance: Patient is alert and fully cooperative with history & exam.  No sign of distress is noted during the visit.      Psychiatric: Affect is pleasant & appropriate.  Patient appears motivated to improve health.       Respiratory: Breathing is regular & unlabored while sitting.      HEENT: Hearing is intact to spoken word.  Speech is clear.  No gross evidence of visual impairment that would impact ambulation.       Dermatologic: right lower extremity - ulcer sub 1st MPJ stable. Measures 1.0cm x 1.0cm x 0.2cm after debridement. Base of wound is granular. No redness, purulent drainage or signs of infection noted.   Incision plantar forefoot well coapted without gapping or necrosis. Sutures intact.  Some serosangenous drainage noted to 4x4 gauze pads. No rednes.   No arch pain and no pain with PROM hallux.  Skin shows no trophic, color or temperature changes otherwise.  No calf redness, swelling or pain noted otherwise.     Vascular: DP & PT pulses are intact & regular bilaterally.  No significant edema or varicosities noted.  CFT and skin temperature is normal to both lower " extremities.       Neurologic: Lower extremity sensation is diminished to feet.      Musculoskeletal: Patient is ambulatory without assistive device or brace.  No gross ankle deformity noted.  No foot or ankle joint effusion is noted.         ASSESSMENT:  51 year old male uncontrolled diabetic male with chronic R foot ulcer POD#5 sp I&D of wound for infectious tenosynovitis     PLAN:  Reviewed patient's chart in [facility].  -ulcer was debrided to right foot. See procedure below.   -foot incision and ulcer appear stable. No acute signs of infection noted.  -NO further surgery indicated at this time.   -Keep foot and dressing dry.   -daily dressing changes to right foot.    1. Cleanse with microcleanse or wound cleanser. Pat dry.    2. Apply iodine to incision and ulcer areas.   3. Wrap with 4x4 gauze pads, large kerlix roll, and ace bandage.  - no weight bearing on right foot.   -Okay to discharge from podiatry standpoint. recs and orders placed.   -Podiatry will sign off. Please call with questions or concerns.       Procedure: After verbal consent, excisional debridement was performed on ulcer.  #15 blade was used to debride ulcer down to and including subcutaneous tissue. Bleeding controlled with light pressure.   No drainage noted.  No anesthesia was used due to neuropathy. Dry dressing applied to foot.  Patient tolerated procedure well.          Roxana Hurtado DPM, Podiatry/Foot and Ankle Surgery    12:58 PM

## 2021-12-27 NOTE — PLAN OF CARE
Patient alert and oriented x4. VSS on RA ex HTN, stefania bp meds. Occasional productive cough, PRN robitussin available. C/o pain to bilateral feet R>L, PRNs given with relief. Dressing to R foot CDI, gauze + kerlix. Up SBA in room, steady on feet w/ walker. WBAT to RLE. Continues w/ IV abx. Podiatry and ID have now signed off.Tolerating mod carb diet, gluc checks ACHS. Voiding fine, uses urinal. No BM reported today. Discharge pending, TCU vs. Homeless shelter.

## 2021-12-27 NOTE — PROVIDER NOTIFICATION
MD Notification    Notified Person: MD    Notified Person Name: Dr. Westfall    Notification Date/Time: 12/27/21; 1132    Notification Interaction: text page    Purpose of Notification: fyi I went to change foot dressing and he had a moderate amount of drainage on the dressing. The site doesn't look infected, but draining. I left foot uncovered for now for assessment.     Orders Received: Spoke with Dr Westfall who assessed foot and will have podiatry see patient.     Comments:

## 2021-12-27 NOTE — PLAN OF CARE
Covid positive, Special precaution maintained. A/Ox4. VSS on RA. S/p right foot ulcer debridement done on 12/22. Up SBA. Podiatry and ID signed off. Tolerating mod carb diet, BG @0213 was 135. Discharge to TCU or homeless shelter pending placement. Continue to monitor.

## 2021-12-27 NOTE — PROGRESS NOTES
[facility]    Hospitalist Progress Note    Date of Service (when I saw the patient): 12/27/2021    Assessment & Plan   Chris Shea is a 51 year old male with homelessness, uncontrolled diabetes with peripheral vascular disease, neuropathy, nephropathy, CKD stage II, chronic bilateral foot wounds, methamphetamine abuse, h/o tobacco abuse who presented on 12/19/2021 with chornic wound/ulcer R foot.   Incidental Covid-19+ testing in an unvaccinated patient.     Right foot cellulitis with tenosynovitis and abscess  Chronic right mid foot ulcer  Status post right foot ulcer debridement, 12/22/2021  Afebrile, normal WBC, no hypotension. Lactate 1.0. Follows with [facility] wound clinic but has not received regular cares. Underwent excision and debridement of this foot ulcer 12/20/21.   MRI foot concerning for tenosynovitis of flexor digitorum longus with possible early osteo vs stress reaction as well.   CRP is 216, procal 0.19.   --Currently on Unasyn and vancomycin per ID  --Podiatry consulted. S/p right foot ulcer debridement on 12/22.    --DH2 offloading shoe for ambulation; heel WB  --PO abx course at discharge per ID Augmentin and Bactrim for two more weeks if BCs continue negative.  --Dr Neely has placed foot-specific discharge instructions.  Podiatry has signed off.  --Obstacles to discharge is homelessness and Covid positive status.  Although generally asymptomatic with Covid he was diagnosed on 12/20/2021 with first date able to identify as recovered status 12/30/2021.  SW following, have referrals to [facility] TCU allowing positive Covid patients and higher ground homeless shelter.  -Discussion with patient 12/25/2021, patient is receptive to TCU or homeless shelter as he knows his homelessness status is obstacle for recovery of both regards to his wounds and general status.  -Wound culture from 12/22/2021, MRSA, group B strep, cornybacterium.  PO abx course at discharge per ID  Augmentin and Bactrim for two more weeks if BCs continue negative.  BC NGTD  -12/27/2021.  Increase drainage at surgical wound, remains afebrile.  Will have Podiatry reassess.  [re]Consult placed.  In addition, significant callus with eschar base of first MTP, concern of future potential skin breakdown and source of infection; ?need of debridement..     COVID-19 infection in unvaccinated patient  Incidental positive findings on screening lab. Denies dyspnea but reports occasional cough and chest pain worse with deep breathing.   He has remained afebrile and has oxygen saturation stable on room air.   CXR on admission is negative.   CRP is 216 (difficult to interpret given above infection) and D dimer 2.24.   --Surveillance COVID-19 PCR test result => POSITIVE  --Covid precautions  --No indication for treatment with steroids nor remdesivir as he does not meet criteria at present  --CT chest obtained to rule out PE given reports of chest pain and significant d dimer elevation.  No evidence of pulmonary embolism.  Pulmonary findings typical of COVID-19 pneumonia.      Poorly controlled diabetes  Neuropathy, Peripheral Vascular Disease and Nephropathy secondary to diabetes  Pseudohyponatremia  CKD stage II  Prior admission was discharged with diabetes supplies and lantus with premeal aspart. He is not taking these.  A1c 9.7 this admission.   --consistent carb diet  --continue lantus 20 units in AM   --corrective dose aspart  -Due to tight glucoses decrease Lantus to 20 units.  Glucoses WNL over 24'.  Monitor.    HERNAN.   Cr on admission elevated at 1.3 from baseline 1-1.2. suspect hypovolemia.   --Last BMP 12/24/2021 normal.  .     Hyponatremia, resolved.  Na 127 on admission. Suspect hypovolemia.   --Latest sodium 12/24/2021 138.     Cold exposure left foot  Cold left foot attributed wet boot and cold exposure in homeless patient. Lower extremity doppler with no evidence for high grade stenosis or occlusion of the left  lower extremity arterial system. There were triphasic waveforms throughout the left lower extremity arterial system.   -no further evaluation needed, strongly advised to DC tobacco.     Methamphetamine Abuse  Noted. UDS positive this admission.      DVT Prophylaxis: Enoxaparin (Lovenox) subcutaneous- held in anticipation of surgery.   No further surgeries planned, in view of positive Covid status restart.  Code Status: Full Code     Disposition:  TBD pending clinical improvement, safe discharge, patient homeless presenting severe obstacles regarding compliance to wound healing, infection and abx use, high risk for recurrent hospitalization. SW assisting with discharge planning including TCU and/or homeless shelter.     Lele Westfall     I spent 25 minutes on the unit/floor in management of care today reviewing labs, medications, interdisciplinary notes; and completing documentation of encounter and orders with over 50% of my time was spent Counseling the Patient regarding foot wound; callus; discharge planning and Coordinating Care and plan with Nursing regarding foot ulcer/wound management and surveillance     Interval History   Nursing reports increased drainage on dressing change right foot ulcer.  Exam confirmed.  In addition significant callus/eschar at base, see above.  Afebrile.        -Data reviewed today: I reviewed all new labs and imaging results over the last 24 hours.    Physical Exam   Temp: 97.7  F (36.5  C) Temp src: Axillary BP: (!) 141/97 Pulse: 80   Resp: 16 SpO2: 97 % O2 Device: None (Room air)    Vitals:    12/19/21 2302   Weight: 108.9 kg (240 lb)     Vital Signs with Ranges  Temp:  [97.1  F (36.2  C)-97.9  F (36.6  C)] 97.7  F (36.5  C)  Pulse:  [72-83] 80  Resp:  [16-18] 16  BP: (140-149)/(87-97) 141/97  SpO2:  [94 %-97 %] 97 %  I/O last 3 completed shifts:  In: 660 [P.O.:660]  Out: 1675 [Urine:1675]    Constitutional:   NAD, alert, calm, cooperative     Respiratory: No labored  breathing, speaking in full sentences, auscultation deferred given COVID-19 status.  GI:  Abdomen soft, nontender and nondistended   Skin/Integumen:  Warm, dry, non-diaphoretic.  Right lower extremity foot : Surgical wound plantar surface with sutures, intact;  able to express yellow drainage, mild tenderness.  Dressing left by Nursing significant drainage.  In addition, significant callus with eschar at base of right MTP.   MSK: CMS x4 intact. Left foot as above  Neuro.  Gross motor tested, nonfocal, sensory intact  Psych oriented, affect calm    Medications       amLODIPine  10 mg Oral Daily     ampicillin-sulbactam (UNASYN) IV  3 g Intravenous Q6H     diclofenac  4 g Topical 4x Daily     enoxaparin ANTICOAGULANT  40 mg Subcutaneous Q12H     insulin aspart  1-10 Units Subcutaneous TID AC     insulin aspart  1-7 Units Subcutaneous At Bedtime     insulin glargine  18 Units Subcutaneous QAM AC     polyethylene glycol  17 g Oral Daily     senna-docusate  1 tablet Oral BID     sodium chloride (PF)  3 mL Intracatheter Q8H     vancomycin  1,750 mg Intravenous Q12H       Data   Recent Labs   Lab 12/27/21  1149 12/27/21  0844 12/27/21  0645 12/27/21  0213 12/26/21  0759 12/26/21  0556 12/25/21  0901 12/25/21  0734 12/24/21  0623 12/24/21  0606 12/22/21  0839 12/22/21  0727 12/21/21  1522   WBC  --   --   --   --   --   --   --   --   --  7.2  --  5.9 5.9   HGB  --   --   --   --   --   --   --   --   --  11.8*  --  12.1* 12.1*   MCV  --   --   --   --   --   --   --   --   --  86  --  84 85   PLT  --   --   --   --   --   --   --  465*  --  352  --  282 264   NA  --   --   --   --   --   --   --   --   --  138  --  133 131*   POTASSIUM  --   --   --   --   --   --   --   --   --  3.8  --  3.9 4.2   CHLORIDE  --   --   --   --   --   --   --   --   --  105  --  100 98   CO2  --   --   --   --   --   --   --   --   --  28  --  25 26   BUN  --   --   --   --   --   --   --   --   --  12  --  15 20   CR  --   --  0.83  --   --   0.88  --  0.90  --  0.90  --  1.03 1.25   ANIONGAP  --   --   --   --   --   --   --   --   --  5  --  8 7   ELINOR  --   --   --   --   --   --   --   --   --  8.3*  --  8.2* 8.2*   * 107*  --  135*   < >  --    < >  --    < > 93   < > 177* 149*    < > = values in this interval not displayed.

## 2021-12-27 NOTE — PHARMACY-VANCOMYCIN DOSING SERVICE
Pharmacy Vancomycin Note  Date of Service 2021  Patient's  1970   51 year old, male    Indication: Abscess  Day of Therapy: 8  Current vancomycin regimen:  1750 mg IV q12h  Current vancomycin monitoring method: AUC  Current vancomycin therapeutic monitoring goal: 400-600 mg*h/L    InsightRX Prediction of Current Vancomycin Regimen  Loading dose: N/A  Regimen: 1750 mg IV every 12 hours.  Start time: 04:13 on 2021  Exposure target: AUC24 (range)400-600 mg/L.hr   AUC24,ss: 489 mg/L.hr  Probability of AUC24 > 400: 92 %  Ctrough,ss: 13.2 mg/L  Probability of Ctrough,ss > 20: 0 %  Probability of nephrotoxicity (Lodise MARKO ): 8 %    Current estimated CrCl = Estimated Creatinine Clearance: 137.3 mL/min (based on SCr of 0.83 mg/dL).    Creatinine for last 3 days  2021:  7:34 AM Creatinine 0.90 mg/dL  2021:  5:56 AM Creatinine 0.88 mg/dL  2021:  6:45 AM Creatinine 0.83 mg/dL    Recent Vancomycin Levels (past 3 days)  2021:  3:18 PM Vancomycin 11.5 mg/L  2021:  4:16 PM Vancomycin 14.2 mg/L    Vancomycin IV Administrations (past 72 hours)                   vancomycin (VANCOCIN) 1,750 mg in sodium chloride 0.9 % 500 mL intermittent infusion (mg) 1,750 mg Given 21 0413     1,750 mg Given 21 1657     1,750 mg Given  0517     1,750 mg Given 21 1718    vancomycin 1500 mg in 0.9% NaCl 250 ml intermittent infusion 1,500 mg (mg) 1,500 mg New Bag 21 0348                Nephrotoxins and other renal medications (From now, onward)    Start     Dose/Rate Route Frequency Ordered Stop    21 1700  vancomycin (VANCOCIN) 1,750 mg in sodium chloride 0.9 % 500 mL intermittent infusion         1,750 mg  over 2 Hours Intravenous EVERY 12 HOURS 21 1632      21 1330  ampicillin-sulbactam (UNASYN) 3 g vial to attach to  mL bag         3 g  over 15-30 Minutes Intravenous EVERY 6 HOURS 21 1314      21 0872  ibuprofen (ADVIL/MOTRIN)  tablet 400 mg         400 mg Oral EVERY 6 HOURS PRN 12/20/21 0843               Contrast Orders - past 72 hours (72h ago, onward)            None          Interpretation of levels and current regimen:  Vancomycin level is reflective of -600    Has serum creatinine changed greater than 50% in last 72 hours: No    Urine output:  good urine output    Renal Function: Improving    InsightRX Prediction of Planned New Vancomycin Regimen  Loading dose: N/A  Regimen: 1750 mg IV every 12 hours.  Start time: 04:13 on 12/28/2021  Exposure target: AUC24 (range)400-600 mg/L.hr   AUC24,ss: 489 mg/L.hr  Probability of AUC24 > 400: 92 %  Ctrough,ss: 13.2 mg/L  Probability of Ctrough,ss > 20: 0 %  Probability of nephrotoxicity (Lodise MARKO 2009): 8 %      Plan:  1. Continue Current Dose  2. Vancomycin monitoring method: AUC  3. Vancomycin therapeutic monitoring goal: 400-600 mg*h/L  4. Pharmacy will check vancomycin levels as appropriate in 1-3 Days.  5. Serum creatinine levels will be ordered a minimum of twice weekly.    Akil Aliheyder, [facility]

## 2021-12-28 LAB
CREAT SERPL-MCNC: 0.88 MG/DL (ref 0.66–1.25)
ERYTHROCYTE [DISTWIDTH] IN BLOOD BY AUTOMATED COUNT: 12.2 % (ref 10–15)
GFR SERPL CREATININE-BSD FRML MDRD: >90 ML/MIN/1.73M2
GLUCOSE BLDC GLUCOMTR-MCNC: 109 MG/DL (ref 70–99)
GLUCOSE BLDC GLUCOMTR-MCNC: 135 MG/DL (ref 70–99)
GLUCOSE BLDC GLUCOMTR-MCNC: 155 MG/DL (ref 70–99)
GLUCOSE BLDC GLUCOMTR-MCNC: 158 MG/DL (ref 70–99)
GLUCOSE BLDC GLUCOMTR-MCNC: 197 MG/DL (ref 70–99)
HCT VFR BLD AUTO: 36.9 % (ref 40–53)
HGB BLD-MCNC: 12 G/DL (ref 13.3–17.7)
MCH RBC QN AUTO: 27.8 PG (ref 26.5–33)
MCHC RBC AUTO-ENTMCNC: 32.5 G/DL (ref 31.5–36.5)
MCV RBC AUTO: 86 FL (ref 78–100)
PLATELET # BLD AUTO: 617 10E3/UL (ref 150–450)
RBC # BLD AUTO: 4.31 10E6/UL (ref 4.4–5.9)
WBC # BLD AUTO: 6.2 10E3/UL (ref 4–11)

## 2021-12-28 PROCEDURE — 258N000003 HC RX IP 258 OP 636: Performed by: INTERNAL MEDICINE

## 2021-12-28 PROCEDURE — 250N000013 HC RX MED GY IP 250 OP 250 PS 637: Performed by: HOSPITALIST

## 2021-12-28 PROCEDURE — 250N000013 HC RX MED GY IP 250 OP 250 PS 637: Performed by: PODIATRIST

## 2021-12-28 PROCEDURE — 250N000011 HC RX IP 250 OP 636: Performed by: PODIATRIST

## 2021-12-28 PROCEDURE — 120N000004 HC R&B MS OVERFLOW

## 2021-12-28 PROCEDURE — 82565 ASSAY OF CREATININE: CPT | Performed by: INTERNAL MEDICINE

## 2021-12-28 PROCEDURE — 99232 SBSQ HOSP IP/OBS MODERATE 35: CPT | Performed by: HOSPITALIST

## 2021-12-28 PROCEDURE — 250N000011 HC RX IP 250 OP 636: Performed by: INTERNAL MEDICINE

## 2021-12-28 PROCEDURE — 85027 COMPLETE CBC AUTOMATED: CPT | Performed by: HOSPITALIST

## 2021-12-28 PROCEDURE — 36415 COLL VENOUS BLD VENIPUNCTURE: CPT | Performed by: HOSPITALIST

## 2021-12-28 RX ADMIN — SENNOSIDES AND DOCUSATE SODIUM 1 TABLET: 50; 8.6 TABLET ORAL at 08:55

## 2021-12-28 RX ADMIN — OXYCODONE HYDROCHLORIDE 5 MG: 5 TABLET ORAL at 19:55

## 2021-12-28 RX ADMIN — AMLODIPINE BESYLATE 10 MG: 10 TABLET ORAL at 08:55

## 2021-12-28 RX ADMIN — SENNOSIDES AND DOCUSATE SODIUM 1 TABLET: 50; 8.6 TABLET ORAL at 19:44

## 2021-12-28 RX ADMIN — DICLOFENAC SODIUM 4 G: 10 GEL TOPICAL at 17:09

## 2021-12-28 RX ADMIN — AMPICILLIN SODIUM AND SULBACTAM SODIUM 3 G: 2; 1 INJECTION, POWDER, FOR SOLUTION INTRAMUSCULAR; INTRAVENOUS at 13:58

## 2021-12-28 RX ADMIN — VANCOMYCIN HYDROCHLORIDE 1750 MG: 1 INJECTION, POWDER, LYOPHILIZED, FOR SOLUTION INTRAVENOUS at 17:16

## 2021-12-28 RX ADMIN — AMPICILLIN SODIUM AND SULBACTAM SODIUM 3 G: 2; 1 INJECTION, POWDER, FOR SOLUTION INTRAMUSCULAR; INTRAVENOUS at 19:45

## 2021-12-28 RX ADMIN — INSULIN ASPART 3 UNITS: 100 INJECTION, SOLUTION INTRAVENOUS; SUBCUTANEOUS at 17:09

## 2021-12-28 RX ADMIN — ENOXAPARIN SODIUM 40 MG: 40 INJECTION SUBCUTANEOUS at 22:39

## 2021-12-28 RX ADMIN — ENOXAPARIN SODIUM 40 MG: 40 INJECTION SUBCUTANEOUS at 09:48

## 2021-12-28 RX ADMIN — ONDANSETRON 4 MG: 4 TABLET, ORALLY DISINTEGRATING ORAL at 19:45

## 2021-12-28 RX ADMIN — VANCOMYCIN HYDROCHLORIDE 1750 MG: 1 INJECTION, POWDER, LYOPHILIZED, FOR SOLUTION INTRAVENOUS at 04:11

## 2021-12-28 RX ADMIN — AMPICILLIN SODIUM AND SULBACTAM SODIUM 3 G: 2; 1 INJECTION, POWDER, FOR SOLUTION INTRAMUSCULAR; INTRAVENOUS at 08:55

## 2021-12-28 RX ADMIN — AMPICILLIN SODIUM AND SULBACTAM SODIUM 3 G: 2; 1 INJECTION, POWDER, FOR SOLUTION INTRAMUSCULAR; INTRAVENOUS at 01:42

## 2021-12-28 RX ADMIN — ACETAMINOPHEN 650 MG: 325 TABLET, FILM COATED ORAL at 19:44

## 2021-12-28 RX ADMIN — POLYETHYLENE GLYCOL 3350 17 G: 17 POWDER, FOR SOLUTION ORAL at 08:55

## 2021-12-28 ASSESSMENT — ACTIVITIES OF DAILY LIVING (ADL)
ADLS_ACUITY_SCORE: 9

## 2021-12-28 NOTE — PLAN OF CARE
AOx4. VSS on RA except HTN. Up SBA. Tolerating mod carb diet. , coverage not needed. Airborne precautions maintained. Voiding adequately. R foot wound, dressing change according to orders. Moderate dry drainage, new dressing CDI. Scheduled abx. PIV SL. Placement in TCU pending. Continue to monitor.

## 2021-12-28 NOTE — PLAN OF CARE
Covid positive, Special precaution maintained. A&O x4. VSS on RA except HTN. S/p right foot ulcer debridement done on 12/22. Up SBA. Podiatry and ID signed off. Receiving vanco/unasyn. Tolerating mod carb diet, BG @0200 was 155. Discharge to TCU or homeless shelter pending placement. Continue to monitor.

## 2021-12-28 NOTE — PROGRESS NOTES
[facility]    Hospitalist Progress Note    Date of Service (when I saw the patient): 12/28/2021    Assessment & Plan   Chris Shea is a 51 year old male with homelessness, uncontrolled diabetes with peripheral vascular disease, neuropathy, nephropathy, CKD stage II, chronic bilateral foot wounds, methamphetamine abuse, h/o tobacco abuse who presented on 12/19/2021 with chornic wound/ulcer R foot.   Incidental Covid-19+ testing in an unvaccinated patient.     Right foot cellulitis with tenosynovitis and abscess  Chronic right mid foot ulcer  Status post right foot ulcer debridement, 12/22/2021  Afebrile, normal WBC, no hypotension. Lactate 1.0. Follows with [facility] wound clinic but has not received regular cares. Underwent excision and debridement of this foot ulcer 12/20/21.   MRI foot concerning for tenosynovitis of flexor digitorum longus with possible early osteo vs stress reaction as well.   CRP is 216, procal 0.19.   --Currently on Unasyn and vancomycin per ID  --Podiatry consulted. S/p right foot ulcer debridement on 12/22.    --DH2 offloading shoe for ambulation; heel WB  --PO abx course at discharge per ID Augmentin and Bactrim for two more weeks if BCs continue negative.  --Dr Neely has placed foot-specific discharge instructions.  Podiatry has signed off.  --Obstacles to discharge is homelessness and Covid positive status.  Although generally asymptomatic with Covid he was diagnosed on 12/20/2021 with first date able to identify as recovered status 12/30/2021.  SW following, have referrals to [facility] TCU allowing positive Covid patients and higher ground homeless shelter.  -Discussion with patient 12/25/2021, patient is receptive to TCU or homeless shelter as he knows his homelessness status is obstacle for recovery of both regards to his wounds and general status.  -Wound culture from 12/22/2021, MRSA, group B strep, cornybacterium.  PO abx course at discharge per ID  Augmentin and Bactrim for two more weeks if BCs continue negative.  BC NGTD  -12/27/2021.  Increase drainage at surgical wound, remains afebrile.  Podiatry performed bedside I&D, otherwise continue current care plans with antibiotics per ID.      COVID-19 infection in unvaccinated patient  Incidental positive findings on screening lab. Denies dyspnea but reports occasional cough and chest pain worse with deep breathing.   He has remained afebrile and has oxygen saturation stable on room air.   CXR on admission is negative.   CRP is 216 (difficult to interpret given above infection) and D dimer 2.24.   --Surveillance COVID-19 PCR test result => POSITIVE  --Covid precautions  --No hypoxia.  Chest CT negative for PE.     Poorly controlled diabetes  Neuropathy, Peripheral Vascular Disease and Nephropathy secondary to diabetes  Pseudohyponatremia  CKD stage II  Prior admission was discharged with diabetes supplies and lantus with premeal aspart. He is not taking these.  A1c 9.7 this admission.   --consistent carb diet  --continue lantus 20 units in AM   --corrective dose aspart  -Due to tight glucoses decrease Lantus to 20 units.  Glucoses WNL over 24'.  Monitor.    HERNAN.   Cr on admission elevated at 1.3 from baseline 1-1.2. suspect hypovolemia.   --Last BMP 12/24/2021 normal.  .     Hyponatremia, resolved.  Na 127 on admission. Suspect hypovolemia.   --Latest sodium 12/24/2021 138.     Cold exposure left foot  Cold left foot attributed wet boot and cold exposure in homeless patient. Lower extremity doppler with no evidence for high grade stenosis or occlusion of the left lower extremity arterial system. There were triphasic waveforms throughout the left lower extremity arterial system.   -no further evaluation needed, strongly advised to DC tobacco.     Methamphetamine Abuse  Noted. UDS positive this admission.      DVT Prophylaxis: Enoxaparin (Lovenox) subcutaneous- held in anticipation of surgery.   No further  surgeries planned, in view of positive Covid status restart.  Code Status: Full Code     Disposition:  TBD pending clinical improvement, safe discharge, patient homeless presenting severe obstacles regarding compliance to wound healing, infection and abx use, high risk for recurrent hospitalization. SW assisting with discharge planning including TCU and/or homeless shelter.  Considered Covid recovered 12/30/2021     Lele Westfall       Interval History   Podiatry performed I/D on wound; continues on abx. SW working on discharge planning; Patient now agreeable to TCU or homeless shelter    SH: No tobacco.  'homeless'     ROS: Complete ROS negative except as above.        -Data reviewed today: I reviewed all new labs and imaging results over the last 24 hours.    Physical Exam   Temp: 97.8  F (36.6  C) Temp src: Axillary BP: (!) 152/88 Pulse: 89   Resp: 16 SpO2: 95 % O2 Device: None (Room air)    Vitals:    12/19/21 2302   Weight: 108.9 kg (240 lb)     Vital Signs with Ranges  Temp:  [97.4  F (36.3  C)-97.9  F (36.6  C)] 97.8  F (36.6  C)  Pulse:  [79-89] 89  Resp:  [16] 16  BP: (126-152)/() 152/88  SpO2:  [93 %-96 %] 95 %  I/O last 3 completed shifts:  In: 480 [P.O.:480]  Out: 2620 [Urine:2620]    Constitutional:   NAD, alert, calm, cooperative   HEENT head atraumatic, no conjunctivitis  Mouth, swallow mechanism intact, buccal mucosa moist.      Respiratory: No labored breathing on room air   Cor: regular, no murmur  GI:  Abdomen soft, nontender and nondistended   Skin/Integumen:  Warm, dry, non-diaphoretic.  Right lower extremity foot : Dressed after podiatry I/D.    MSK: CMS x4 intact. Left foot as above  Neuro.  Gross motor tested, nonfocal, sensory intact  Psych oriented, affect calm    Medications       amLODIPine  10 mg Oral Daily     ampicillin-sulbactam (UNASYN) IV  3 g Intravenous Q6H     diclofenac  4 g Topical 4x Daily     enoxaparin ANTICOAGULANT  40 mg Subcutaneous Q12H     insulin aspart   1-10 Units Subcutaneous TID AC     insulin aspart  1-7 Units Subcutaneous At Bedtime     insulin glargine  18 Units Subcutaneous QAM AC     polyethylene glycol  17 g Oral Daily     senna-docusate  1 tablet Oral BID     sodium chloride (PF)  3 mL Intracatheter Q8H     vancomycin  1,750 mg Intravenous Q12H       Data   Recent Labs   Lab 12/28/21  1219 12/28/21  0805 12/28/21  0703 12/28/21  0223 12/27/21  0844 12/27/21  0645 12/26/21  0759 12/26/21  0556 12/25/21  0901 12/25/21  0734 12/24/21  0623 12/24/21  0606 12/22/21  0839 12/22/21  0727   WBC  --   --  6.2  --   --   --   --   --   --   --   --  7.2  --  5.9   HGB  --   --  12.0*  --   --   --   --   --   --   --   --  11.8*  --  12.1*   MCV  --   --  86  --   --   --   --   --   --   --   --  86  --  84   PLT  --   --  617*  --   --   --   --   --   --  465*  --  352  --  282   NA  --   --   --   --   --   --   --   --   --   --   --  138  --  133   POTASSIUM  --   --   --   --   --   --   --   --   --   --   --  3.8  --  3.9   CHLORIDE  --   --   --   --   --   --   --   --   --   --   --  105  --  100   CO2  --   --   --   --   --   --   --   --   --   --   --  28  --  25   BUN  --   --   --   --   --   --   --   --   --   --   --  12  --  15   CR  --   --  0.88  --   --  0.83  --  0.88  --  0.90  --  0.90  --  1.03   ANIONGAP  --   --   --   --   --   --   --   --   --   --   --  5  --  8   ELINOR  --   --   --   --   --   --   --   --   --   --   --  8.3*  --  8.2*   * 109*  --  155*   < >  --    < >  --    < >  --    < > 93   < > 177*    < > = values in this interval not displayed.

## 2021-12-28 NOTE — PLAN OF CARE
Covid positive, special px maintained. Pt A&O. VSS. On RA. Pain in RLE controlled with prn oxycodone and ibuprofen. Dressing with increased drainage today. Podiatry rounded for reassessment. Daily dressing changes. Voiding. Tolerating diet. , 113, 146. SBA, minimal heal weightbearing per podiatry. IV SL. Receiving vanco/unasyn. SW following, plans for TCU vs homeless shelter.

## 2021-12-28 NOTE — PLAN OF CARE
Covid positive, special px maintained. Pt A&Ox4, VSS on RA. Pain in RLE controlled with prn oxycodone x1, tylenol x1 and advil. PRN zofran given once for nausea w/ relief. Robitussin given for infrequent, dry cough. Dressing on RLE CDI. Voiding in urinal. Tolerating diet. . SBA, minimal heal weightbearing per podiatry. IV SL w/ int antibiotics. Receiving vanco/unasyn. SW following, plans for TCU at discharge.

## 2021-12-29 LAB
BACTERIA WND CULT: ABNORMAL
BACTERIA WND CULT: ABNORMAL
CREAT SERPL-MCNC: 0.9 MG/DL (ref 0.66–1.25)
GFR SERPL CREATININE-BSD FRML MDRD: >90 ML/MIN/1.73M2
GLUCOSE BLDC GLUCOMTR-MCNC: 115 MG/DL (ref 70–99)
GLUCOSE BLDC GLUCOMTR-MCNC: 127 MG/DL (ref 70–99)
GLUCOSE BLDC GLUCOMTR-MCNC: 143 MG/DL (ref 70–99)
GLUCOSE BLDC GLUCOMTR-MCNC: 155 MG/DL (ref 70–99)
GLUCOSE BLDC GLUCOMTR-MCNC: 170 MG/DL (ref 70–99)
GLUCOSE BLDC GLUCOMTR-MCNC: 263 MG/DL (ref 70–99)
VANCOMYCIN SERPL-MCNC: 16.8 MG/L

## 2021-12-29 PROCEDURE — 120N000004 HC R&B MS OVERFLOW

## 2021-12-29 PROCEDURE — 250N000013 HC RX MED GY IP 250 OP 250 PS 637: Performed by: PODIATRIST

## 2021-12-29 PROCEDURE — 258N000003 HC RX IP 258 OP 636: Performed by: INTERNAL MEDICINE

## 2021-12-29 PROCEDURE — 36415 COLL VENOUS BLD VENIPUNCTURE: CPT | Performed by: INTERNAL MEDICINE

## 2021-12-29 PROCEDURE — 250N000011 HC RX IP 250 OP 636: Performed by: PODIATRIST

## 2021-12-29 PROCEDURE — 82565 ASSAY OF CREATININE: CPT | Performed by: INTERNAL MEDICINE

## 2021-12-29 PROCEDURE — 99232 SBSQ HOSP IP/OBS MODERATE 35: CPT | Performed by: STUDENT IN AN ORGANIZED HEALTH CARE EDUCATION/TRAINING PROGRAM

## 2021-12-29 PROCEDURE — 80202 ASSAY OF VANCOMYCIN: CPT | Performed by: INTERNAL MEDICINE

## 2021-12-29 PROCEDURE — 250N000013 HC RX MED GY IP 250 OP 250 PS 637: Performed by: HOSPITALIST

## 2021-12-29 PROCEDURE — 250N000011 HC RX IP 250 OP 636: Performed by: INTERNAL MEDICINE

## 2021-12-29 RX ADMIN — VANCOMYCIN HYDROCHLORIDE 1750 MG: 1 INJECTION, POWDER, LYOPHILIZED, FOR SOLUTION INTRAVENOUS at 18:04

## 2021-12-29 RX ADMIN — ACETAMINOPHEN 650 MG: 325 TABLET, FILM COATED ORAL at 21:32

## 2021-12-29 RX ADMIN — ONDANSETRON 4 MG: 2 INJECTION INTRAMUSCULAR; INTRAVENOUS at 17:33

## 2021-12-29 RX ADMIN — AMPICILLIN SODIUM AND SULBACTAM SODIUM 3 G: 2; 1 INJECTION, POWDER, FOR SOLUTION INTRAMUSCULAR; INTRAVENOUS at 17:26

## 2021-12-29 RX ADMIN — OXYCODONE HYDROCHLORIDE 5 MG: 5 TABLET ORAL at 21:32

## 2021-12-29 RX ADMIN — DICLOFENAC SODIUM 4 G: 10 GEL TOPICAL at 08:37

## 2021-12-29 RX ADMIN — PROCHLORPERAZINE MALEATE 10 MG: 10 TABLET ORAL at 19:55

## 2021-12-29 RX ADMIN — VANCOMYCIN HYDROCHLORIDE 1750 MG: 1 INJECTION, POWDER, LYOPHILIZED, FOR SOLUTION INTRAVENOUS at 04:54

## 2021-12-29 RX ADMIN — AMPICILLIN SODIUM AND SULBACTAM SODIUM 3 G: 2; 1 INJECTION, POWDER, FOR SOLUTION INTRAMUSCULAR; INTRAVENOUS at 02:29

## 2021-12-29 RX ADMIN — AMPICILLIN SODIUM AND SULBACTAM SODIUM 3 G: 2; 1 INJECTION, POWDER, FOR SOLUTION INTRAMUSCULAR; INTRAVENOUS at 23:07

## 2021-12-29 RX ADMIN — ENOXAPARIN SODIUM 40 MG: 40 INJECTION SUBCUTANEOUS at 21:33

## 2021-12-29 RX ADMIN — AMLODIPINE BESYLATE 10 MG: 10 TABLET ORAL at 08:37

## 2021-12-29 RX ADMIN — AMPICILLIN SODIUM AND SULBACTAM SODIUM 3 G: 2; 1 INJECTION, POWDER, FOR SOLUTION INTRAMUSCULAR; INTRAVENOUS at 08:37

## 2021-12-29 RX ADMIN — INSULIN ASPART 2 UNITS: 100 INJECTION, SOLUTION INTRAVENOUS; SUBCUTANEOUS at 17:31

## 2021-12-29 RX ADMIN — ENOXAPARIN SODIUM 40 MG: 40 INJECTION SUBCUTANEOUS at 09:26

## 2021-12-29 RX ADMIN — SENNOSIDES AND DOCUSATE SODIUM 1 TABLET: 50; 8.6 TABLET ORAL at 08:37

## 2021-12-29 ASSESSMENT — ACTIVITIES OF DAILY LIVING (ADL)
ADLS_ACUITY_SCORE: 5
ADLS_ACUITY_SCORE: 5
ADLS_ACUITY_SCORE: 9
ADLS_ACUITY_SCORE: 5
ADLS_ACUITY_SCORE: 5
ADLS_ACUITY_SCORE: 9
ADLS_ACUITY_SCORE: 5
ADLS_ACUITY_SCORE: 5
ADLS_ACUITY_SCORE: 8
ADLS_ACUITY_SCORE: 5
ADLS_ACUITY_SCORE: 8
ADLS_ACUITY_SCORE: 5
ADLS_ACUITY_SCORE: 5
ADLS_ACUITY_SCORE: 8
ADLS_ACUITY_SCORE: 5
ADLS_ACUITY_SCORE: 5
ADLS_ACUITY_SCORE: 8
ADLS_ACUITY_SCORE: 5

## 2021-12-29 NOTE — PLAN OF CARE
Pt AOx4. VSS on RA. Up SBA. Tolerating mod carb diet. BS check. Voiding in urinal. R foot wound CDI. PIV SL.  Zofran given x1 & pain relief with Oxy x1. TCU placement pending.

## 2021-12-29 NOTE — PROGRESS NOTES
"A&Ox4, not in apparent discomfort, VSS on RA, NVI. RLE w/ Ace wrap, elevated. Dressing clean dry and intact. No c/o pain. Podiatry and ID signed off. Unasyn and Vanco infusion given via PIV at R. Tolerating mod carb diet, BG @0200 was 155. Discharge to TCU or homeless shelter pending placement. Referrals sent care of SW. Continue to monitor.     Vitals: /86 (BP Location: Left arm, Patient Position: Semi-Zamora's)   Pulse 82   Temp 98.6  F (37  C) (Oral)   Resp 16   Ht 1.905 m (6' 3\")   Wt 108.9 kg (240 lb)   SpO2 98%   BMI 30.00 kg/m          "

## 2021-12-29 NOTE — PHARMACY-ADMISSION MEDICATION HISTORY
Pharmacy Vancomycin Note  Date of Service 2021  Patient's  1970   51 year old, male    Indication: Abscess  Day of Therapy: 10  Current vancomycin regimen:  1750 mg IV q 12 h  Current vancomycin monitoring method: AUC  Current vancomycin therapeutic monitoring goal: 400-600 mg*h/L    InsightRX Prediction of Current Vancomycin Regimen  Loading dose: N/A  Regimen: 1750 mg IV every 12 hours.  Start time: 16:54 on 2021  Exposure target: AUC24 (range)400-600 mg/L.hr   AUC24,ss: 511 mg/L.hr  Probability of AUC24 > 400: 96 %  Ctrough,ss: 14 mg/L  Probability of Ctrough,ss > 20: 0 %  Probability of nephrotoxicity (Lodise MARKO ): 9 %    Current estimated CrCl = Estimated Creatinine Clearance: 126.6 mL/min (based on SCr of 0.9 mg/dL).    Creatinine for last 3 days  2021:  6:45 AM Creatinine 0.83 mg/dL  2021:  7:03 AM Creatinine 0.88 mg/dL  2021:  6:45 AM Creatinine 0.90 mg/dL    Recent Vancomycin Levels (past 3 days)  2021:  4:16 PM Vancomycin 14.2 mg/L  2021:  2:55 PM Vancomycin 16.8 mg/L    Vancomycin IV Administrations (past 72 hours)                   vancomycin (VANCOCIN) 1,750 mg in sodium chloride 0.9 % 500 mL intermittent infusion (mg) 1,750 mg Given 21 0454     1,750 mg Given 21 1716     1,750 mg Given  0411     1,750 mg Given 21 1756     1,750 mg Given  0413     1,750 mg Given 21 1657                Nephrotoxins and other renal medications (From now, onward)    Start     Dose/Rate Route Frequency Ordered Stop    21 1700  vancomycin (VANCOCIN) 1,750 mg in sodium chloride 0.9 % 500 mL intermittent infusion         1,750 mg  over 2 Hours Intravenous EVERY 12 HOURS 21 1632      21 1330  ampicillin-sulbactam (UNASYN) 3 g vial to attach to  mL bag         3 g  over 15-30 Minutes Intravenous EVERY 6 HOURS 21 1314      21 0843  ibuprofen (ADVIL/MOTRIN) tablet 400 mg         400 mg Oral EVERY 6 HOURS PRN  12/20/21 0843               Contrast Orders - past 72 hours (72h ago, onward)            None          Interpretation of levels and current regimen:  Vancomycin level is reflective of -600    Has serum creatinine changed greater than 50% in last 72 hours: No    Urine output:  good urine output    Renal Function: Stable    InsightRX Prediction of Planned New Vancomycin Regimen  Loading dose: N/A  Regimen: 1750 mg IV every 12 hours.  Start time: 16:54 on 12/29/2021  Exposure target: AUC24 (range)400-600 mg/L.hr   AUC24,ss: 511 mg/L.hr  Probability of AUC24 > 400: 96 %  Ctrough,ss: 14 mg/L  Probability of Ctrough,ss > 20: 0 %  Probability of nephrotoxicity (Lodise MARKO 2009): 9 %    Plan:  1. Continue Current Dose  2. Vancomycin monitoring method: AUC  3. Vancomycin therapeutic monitoring goal: 400-600 mg*h/L  4. Pharmacy will check vancomycin levels as appropriate in 3-5 Days.  5. Serum creatinine levels will be ordered daily for the first week of therapy and at least twice weekly for subsequent weeks.    Meryl Dubose [facility]

## 2021-12-29 NOTE — PROVIDER NOTIFICATION
Text convo with Dr. June KEYS pt with episode of diarrhea and emesis x3, complaining of metalic taste in mouth, wondering if this if side effect from antibiotics?    MD response: Not likely, continue to monitor nausea/vomitting.

## 2021-12-29 NOTE — PROGRESS NOTES
[facility]    Hospitalist Progress Note    Date of Service (when I saw the patient): 12/29/2021    Assessment & Plan   Chris Shea is a 51 year old male with homelessness, uncontrolled diabetes with peripheral vascular disease, neuropathy, nephropathy, CKD stage II, chronic bilateral foot wounds, methamphetamine abuse, h/o tobacco abuse who presented on 12/19/2021 with chornic wound/ulcer R foot.   Incidental Covid-19+ testing in an unvaccinated patient.     Right foot cellulitis with tenosynovitis and abscess  Chronic right mid foot ulcer  Status post right foot ulcer debridement, 12/22/2021  Afebrile, normal WBC, no hypotension. Lactate 1.0. Follows with [facility] wound clinic but has not received regular cares. Underwent excision and debridement of this foot ulcer 12/20/21.   MRI foot concerning for tenosynovitis of flexor digitorum longus with possible early osteo vs stress reaction as well.   CRP is 216, procal 0.19.   --Currently on Unasyn and vancomycin per ID  --Podiatry consulted. S/p right foot ulcer debridement on 12/22.    --DH2 offloading shoe for ambulation; heel WB  --PO abx course at discharge per ID Augmentin and Bactrim for two more weeks if BCs continue negative.  --Dr Neely has placed foot-specific discharge instructions.  Podiatry has signed off.  -12/27/2021.  Increase drainage at surgical wound, remains afebrile.  Podiatry performed bedside I&D, otherwise continue current care plans with antibiotics per ID.   - plan to discharge tomorrow pending acceptance at facility     COVID-19 infection in unvaccinated patient  Incidental positive findings on screening lab. Denies dyspnea but reports occasional cough and chest pain worse with deep breathing.   He has remained afebrile and has oxygen saturation stable on room air.   CXR on admission is negative.   CRP is 216 (difficult to interpret given above infection) and D dimer 2.24.   --Surveillance COVID-19 PCR test result  => POSITIVE  --Covid precautions  --No hypoxia.  Chest CT negative for PE.     Poorly controlled diabetes  Neuropathy, Peripheral Vascular Disease and Nephropathy secondary to diabetes  Pseudohyponatremia  CKD stage II  Prior admission was discharged with diabetes supplies and lantus with premeal aspart. He is not taking these.  A1c 9.7 this admission.   --consistent carb diet, limited sliding scale needed, continue lantus 18  Units and sliding scale    HERNAN-resolved  Cr on admission elevated at 1.3 from baseline 1-1.2. suspect hypovolemia.       Hyponatremia, resolved.  Na 127 on admission. Suspect hypovolemia.   --Latest sodium 12/24/2021 138.     Cold exposure left foot  Cold left foot attributed wet boot and cold exposure in homeless patient. Lower extremity doppler with no evidence for high grade stenosis or occlusion of the left lower extremity arterial system. There were triphasic waveforms throughout the left lower extremity arterial system.   -no further evaluation needed, strongly advised to DC tobacco.     Methamphetamine Abuse  Noted. UDS positive this admission.      DVT Prophylaxis: Enoxaparin (Lovenox) subcutaneous   Code Status: Full Code     Disposition:  SW assisting with discharge planning including TCU and/or homeless shelter.  Considered Covid recovered 12/30/2021     Veronica Watkins       Interval History   Patient without complaints today. He is calm and cooperative. Reports better pain control after debridement yesterday. Having normal BMs. No cough. No chest pain.     SH: No tobacco.  'homeless'     ROS: Complete ROS negative except as above.        -Data reviewed today: I reviewed all new labs and imaging results over the last 24 hours.    Physical Exam   Temp: 98.3  F (36.8  C) Temp src: Oral BP: (!) 140/85 Pulse: 85   Resp: 17 SpO2: 98 % O2 Device: None (Room air)    Vitals:    12/19/21 2302   Weight: 108.9 kg (240 lb)     Vital Signs with Ranges  Temp:  [97.8  F (36.6  C)-98.6  F (37  C)]  98.3  F (36.8  C)  Pulse:  [82-89] 85  Resp:  [16-18] 17  BP: (126-140)/(83-86) 140/85  SpO2:  [96 %-98 %] 98 %  I/O last 3 completed shifts:  In: 340 [P.O.:340]  Out: 2220 [Urine:2220]    Constitutional:   NAD, alert, calm, cooperative   HEENT EOMI    Respiratory: No labored breathing on room air   Card: regular, no murmur  GI:  Abdomen soft, nontender and nondistended   Skin/Integumen:  Warm, dry, non-diaphoretic.  Right lower extremity foot : Dressed after podiatry I/D.  Clean and no drainage  MSK: CMS x4 intact. Left foot as above  Neuro.  Gross motor tested, nonfocal, sensory intact  Psych oriented, affect calm    Medications       amLODIPine  10 mg Oral Daily     ampicillin-sulbactam (UNASYN) IV  3 g Intravenous Q6H     diclofenac  4 g Topical 4x Daily     enoxaparin ANTICOAGULANT  40 mg Subcutaneous Q12H     insulin aspart  1-10 Units Subcutaneous TID AC     insulin aspart  1-7 Units Subcutaneous At Bedtime     insulin glargine  18 Units Subcutaneous QAM AC     polyethylene glycol  17 g Oral Daily     senna-docusate  1 tablet Oral BID     sodium chloride (PF)  3 mL Intracatheter Q8H     vancomycin  1,750 mg Intravenous Q12H       Data   Recent Labs   Lab 12/29/21  0746 12/29/21  0645 12/29/21  0204 12/28/21  2239 12/28/21  0805 12/28/21  0703 12/27/21  0844 12/27/21  0645 12/25/21  0901 12/25/21  0734 12/24/21  0623 12/24/21  0606   WBC  --   --   --   --   --  6.2  --   --   --   --   --  7.2   HGB  --   --   --   --   --  12.0*  --   --   --   --   --  11.8*   MCV  --   --   --   --   --  86  --   --   --   --   --  86   PLT  --   --   --   --   --  617*  --   --   --  465*  --  352   NA  --   --   --   --   --   --   --   --   --   --   --  138   POTASSIUM  --   --   --   --   --   --   --   --   --   --   --  3.8   CHLORIDE  --   --   --   --   --   --   --   --   --   --   --  105   CO2  --   --   --   --   --   --   --   --   --   --   --  28   BUN  --   --   --   --   --   --   --   --   --   --   --   12   CR  --  0.90  --   --   --  0.88  --  0.83   < > 0.90  --  0.90   ANIONGAP  --   --   --   --   --   --   --   --   --   --   --  5   ELINOR  --   --   --   --   --   --   --   --   --   --   --  8.3*   *  --  155* 158*   < >  --    < >  --    < >  --    < > 93    < > = values in this interval not displayed.

## 2021-12-30 LAB
CREAT SERPL-MCNC: 0.81 MG/DL (ref 0.66–1.25)
GFR SERPL CREATININE-BSD FRML MDRD: >90 ML/MIN/1.73M2
GLUCOSE BLDC GLUCOMTR-MCNC: 117 MG/DL (ref 70–99)
GLUCOSE BLDC GLUCOMTR-MCNC: 157 MG/DL (ref 70–99)
GLUCOSE BLDC GLUCOMTR-MCNC: 170 MG/DL (ref 70–99)
GLUCOSE BLDC GLUCOMTR-MCNC: 212 MG/DL (ref 70–99)
GLUCOSE BLDC GLUCOMTR-MCNC: 224 MG/DL (ref 70–99)

## 2021-12-30 PROCEDURE — 250N000011 HC RX IP 250 OP 636: Performed by: PODIATRIST

## 2021-12-30 PROCEDURE — 250N000011 HC RX IP 250 OP 636: Performed by: INTERNAL MEDICINE

## 2021-12-30 PROCEDURE — 250N000013 HC RX MED GY IP 250 OP 250 PS 637: Performed by: PODIATRIST

## 2021-12-30 PROCEDURE — 82565 ASSAY OF CREATININE: CPT | Performed by: INTERNAL MEDICINE

## 2021-12-30 PROCEDURE — 36415 COLL VENOUS BLD VENIPUNCTURE: CPT | Performed by: INTERNAL MEDICINE

## 2021-12-30 PROCEDURE — 258N000003 HC RX IP 258 OP 636: Performed by: INTERNAL MEDICINE

## 2021-12-30 PROCEDURE — 250N000013 HC RX MED GY IP 250 OP 250 PS 637: Performed by: HOSPITALIST

## 2021-12-30 PROCEDURE — 120N000004 HC R&B MS OVERFLOW

## 2021-12-30 PROCEDURE — 99232 SBSQ HOSP IP/OBS MODERATE 35: CPT | Performed by: STUDENT IN AN ORGANIZED HEALTH CARE EDUCATION/TRAINING PROGRAM

## 2021-12-30 RX ADMIN — INSULIN ASPART 1 UNITS: 100 INJECTION, SOLUTION INTRAVENOUS; SUBCUTANEOUS at 18:01

## 2021-12-30 RX ADMIN — ACETAMINOPHEN 650 MG: 325 TABLET, FILM COATED ORAL at 22:13

## 2021-12-30 RX ADMIN — AMPICILLIN SODIUM AND SULBACTAM SODIUM 3 G: 2; 1 INJECTION, POWDER, FOR SOLUTION INTRAMUSCULAR; INTRAVENOUS at 10:44

## 2021-12-30 RX ADMIN — DICLOFENAC SODIUM 4 G: 10 GEL TOPICAL at 19:36

## 2021-12-30 RX ADMIN — VANCOMYCIN HYDROCHLORIDE 1750 MG: 1 INJECTION, POWDER, LYOPHILIZED, FOR SOLUTION INTRAVENOUS at 17:18

## 2021-12-30 RX ADMIN — ACETAMINOPHEN 650 MG: 325 TABLET, FILM COATED ORAL at 15:31

## 2021-12-30 RX ADMIN — AMPICILLIN SODIUM AND SULBACTAM SODIUM 3 G: 2; 1 INJECTION, POWDER, FOR SOLUTION INTRAMUSCULAR; INTRAVENOUS at 22:16

## 2021-12-30 RX ADMIN — OXYCODONE HYDROCHLORIDE 5 MG: 5 TABLET ORAL at 22:13

## 2021-12-30 RX ADMIN — INSULIN ASPART 3 UNITS: 100 INJECTION, SOLUTION INTRAVENOUS; SUBCUTANEOUS at 12:33

## 2021-12-30 RX ADMIN — DICLOFENAC SODIUM 4 G: 10 GEL TOPICAL at 08:40

## 2021-12-30 RX ADMIN — AMLODIPINE BESYLATE 10 MG: 10 TABLET ORAL at 08:38

## 2021-12-30 RX ADMIN — AMPICILLIN SODIUM AND SULBACTAM SODIUM 3 G: 2; 1 INJECTION, POWDER, FOR SOLUTION INTRAMUSCULAR; INTRAVENOUS at 16:43

## 2021-12-30 RX ADMIN — AMPICILLIN SODIUM AND SULBACTAM SODIUM 3 G: 2; 1 INJECTION, POWDER, FOR SOLUTION INTRAMUSCULAR; INTRAVENOUS at 04:56

## 2021-12-30 RX ADMIN — ONDANSETRON 4 MG: 4 TABLET, ORALLY DISINTEGRATING ORAL at 08:41

## 2021-12-30 RX ADMIN — SENNOSIDES AND DOCUSATE SODIUM 1 TABLET: 50; 8.6 TABLET ORAL at 19:30

## 2021-12-30 RX ADMIN — VANCOMYCIN HYDROCHLORIDE 1750 MG: 1 INJECTION, POWDER, LYOPHILIZED, FOR SOLUTION INTRAVENOUS at 05:18

## 2021-12-30 ASSESSMENT — ACTIVITIES OF DAILY LIVING (ADL)
ADLS_ACUITY_SCORE: 6
ADLS_ACUITY_SCORE: 6
ADLS_ACUITY_SCORE: 8
ADLS_ACUITY_SCORE: 6
ADLS_ACUITY_SCORE: 7
ADLS_ACUITY_SCORE: 8
ADLS_ACUITY_SCORE: 7
ADLS_ACUITY_SCORE: 8
ADLS_ACUITY_SCORE: 8
ADLS_ACUITY_SCORE: 6
ADLS_ACUITY_SCORE: 8
ADLS_ACUITY_SCORE: 7
ADLS_ACUITY_SCORE: 6
ADLS_ACUITY_SCORE: 8
ADLS_ACUITY_SCORE: 6
ADLS_ACUITY_SCORE: 8
ADLS_ACUITY_SCORE: 7
ADLS_ACUITY_SCORE: 8
ADLS_ACUITY_SCORE: 6
ADLS_ACUITY_SCORE: 8

## 2021-12-30 NOTE — PROGRESS NOTES
Care Management Follow Up    Length of Stay (days): 9    Expected Discharge Date: 12/30/2021     Concerns to be Addressed: discharge planning    Patient plan of care discussed at interdisciplinary rounds: Yes    Anticipated Discharge Disposition:  TCU     Anticipated Discharge Services:    Anticipated Discharge DME:      Patient/family educated on Medicare website which has current facility and service quality ratings:  yes  Education Provided on the Discharge Plan:  yes  Patient/Family in Agreement with the Plan:  yes    Referrals Placed by CM/SW:  TCU  Private pay costs discussed: Not applicable    Additional Information:  SW spoke to patient to obtain TCU choices.   Referrals sent and pending.    SW to continue to follow and assist with discharge planning.    SHIN Freedman  Daytime (8:00am-4:30pm): [phone]  After-Hours SW Pager (4:30pm-11:30pm): [phone]           SHIN Carreon

## 2021-12-30 NOTE — PROGRESS NOTES
[facility]    Hospitalist Progress Note    Date of Service (when I saw the patient): 12/30/2021    Assessment & Plan   Chris Shea is a 51 year old male with homelessness, uncontrolled diabetes with peripheral vascular disease, neuropathy, nephropathy, CKD stage II, chronic bilateral foot wounds, methamphetamine abuse, h/o tobacco abuse who presented on 12/19/2021 with chornic wound/ulcer R foot.   Incidental Covid-19+ testing in an unvaccinated patient.     Right foot cellulitis with tenosynovitis and abscess  Chronic right mid foot ulcer  Status post right foot ulcer debridement, 12/22/2021  Afebrile, normal WBC, no hypotension. Lactate 1.0. Follows with [facility] wound clinic but has not received regular cares. Underwent excision and debridement of this foot ulcer 12/20/21.   MRI foot concerning for tenosynovitis of flexor digitorum longus with possible early osteo vs stress reaction as well.   CRP is 216, procal 0.19.   --Currently on Unasyn and vancomycin per ID  --Podiatry consulted. S/p right foot ulcer debridement on 12/22.    --DH2 offloading shoe for ambulation; heel WB  --PO abx course at discharge per ID Augmentin and Bactrim for two more weeks if BCs continue negative.  --Dr Neely has placed foot-specific discharge instructions.  Podiatry has signed off.  -12/27/2021.  Increase drainage at surgical wound, remains afebrile.  Podiatry performed bedside I&D, otherwise continue current care plans with antibiotics per ID.   - covid recovered and stable to discharge on PO antibiotics if facility accepts     COVID-19 infection in unvaccinated patient- now recovered  Incidental positive findings on screening lab. Denies dyspnea but reports occasional cough and chest pain worse with deep breathing.   He has remained afebrile and has oxygen saturation stable on room air.   CXR on admission is negative.   -- No hypoxia.  Chest CT negative for PE.  -- Patient now refusing lovenox due to  concern it caused nausea yesterday. He was educated on importance of taking     Poorly controlled diabetes  Neuropathy, Peripheral Vascular Disease and Nephropathy secondary to diabetes  Pseudohyponatremia  CKD stage II  Prior admission was discharged with diabetes supplies and lantus with premeal aspart. He is not taking these.  A1c 9.7 this admission.   -- continue lantus 18  Units and sliding scale    HERNAN-resolved  Cr on admission elevated at 1.3 from baseline 1-1.2. suspect hypovolemia.     Hyponatremia, resolved.  Na 127 on admission. Suspect hypovolemia.   --Latest sodium 12/24/2021 138.     Cold exposure left foot  Cold left foot attributed wet boot and cold exposure in homeless patient. Lower extremity doppler with no evidence for high grade stenosis or occlusion of the left lower extremity arterial system. There were triphasic waveforms throughout the left lower extremity arterial system.   -no further evaluation needed, strongly advised to DC tobacco.     Methamphetamine Abuse  Noted. UDS positive this admission.      DVT Prophylaxis: Enoxaparin (Lovenox) subcutaneous   Code Status: Full Code     Disposition:  SW assisting with discharge planning including TCU and/or homeless shelter.  Considered Covid recovered 12/30/2021     Veronica Watkins       Interval History   Patient reports good pain control. He is ambulating well with his limitations in mobility. No cough. No fevers. He had some vomiting yesterday and episode of diarrhea however all resolved today. He is now refusing lovenox, he became angry upon discussion COVID but later apologized for his behavior.    SH: No tobacco.  'homeless'     ROS: Complete ROS negative except as above.        -Data reviewed today: I reviewed all new labs and imaging results over the last 24 hours.    Physical Exam   Temp: 98.3  F (36.8  C) Temp src: Oral BP: (!) 137/91 Pulse: 85   Resp: 20 SpO2: 95 % O2 Device: None (Room air)    Vitals:    12/19/21 2302   Weight: 108.9  kg (240 lb)     Vital Signs with Ranges  Temp:  [97.8  F (36.6  C)-98.5  F (36.9  C)] 98.3  F (36.8  C)  Pulse:  [73-91] 85  Resp:  [17-20] 20  BP: (113-138)/(73-91) 137/91  SpO2:  [95 %-98 %] 95 %  I/O last 3 completed shifts:  In: 530 [P.O.:530]  Out: 2775 [Urine:2775]    Constitutional:   NAD, alert, calm, cooperative   HEENT EOMI    Respiratory: No labored breathing on room air   Card: regular, no murmur  GI:  Abdomen soft, nontender and nondistended   Skin/Integumen:  Warm, dry, non-diaphoretic.  Right lower extremity foot : Dressed in ACE wrap, Clean and no drainage  MSK: CMS x4 intact. Left foot as above  Neuro.  Gross motor tested, nonfocal, sensory intact  Psych oriented, affect calm    Medications       amLODIPine  10 mg Oral Daily     ampicillin-sulbactam (UNASYN) IV  3 g Intravenous Q6H     diclofenac  4 g Topical 4x Daily     enoxaparin ANTICOAGULANT  40 mg Subcutaneous Q12H     insulin aspart  1-10 Units Subcutaneous TID AC     insulin aspart  1-7 Units Subcutaneous At Bedtime     insulin glargine  18 Units Subcutaneous QAM AC     polyethylene glycol  17 g Oral Daily     senna-docusate  1 tablet Oral BID     sodium chloride (PF)  3 mL Intracatheter Q8H     vancomycin  1,750 mg Intravenous Q12H       Data   Recent Labs   Lab 12/30/21  0843 12/30/21  0623 12/30/21  0237 12/29/21  2131 12/29/21  0746 12/29/21  0645 12/28/21  0805 12/28/21  0703 12/25/21  0901 12/25/21  0734 12/24/21  0623 12/24/21  0606   WBC  --   --   --   --   --   --   --  6.2  --   --   --  7.2   HGB  --   --   --   --   --   --   --  12.0*  --   --   --  11.8*   MCV  --   --   --   --   --   --   --  86  --   --   --  86   PLT  --   --   --   --   --   --   --  617*  --  465*  --  352   NA  --   --   --   --   --   --   --   --   --   --   --  138   POTASSIUM  --   --   --   --   --   --   --   --   --   --   --  3.8   CHLORIDE  --   --   --   --   --   --   --   --   --   --   --  105   CO2  --   --   --   --   --   --   --   --    --   --   --  28   BUN  --   --   --   --   --   --   --   --   --   --   --  12   CR  --  0.81  --   --   --  0.90  --  0.88   < > 0.90  --  0.90   ANIONGAP  --   --   --   --   --   --   --   --   --   --   --  5   ELINOR  --   --   --   --   --   --   --   --   --   --   --  8.3*   *  --  170* 263*   < >  --    < >  --    < >  --    < > 93    < > = values in this interval not displayed.

## 2021-12-30 NOTE — PLAN OF CARE
Assumed care today at 0730. COVID recovered. Pt is a/o X4. VSS. Pain controlled with tylenol. Tolerating reg diet. Up with 1. Voids in urinal. Encouraged to ambulate. Refused Lovenox today. MD aware. Dressing change done on R foot. TCU pending. Continue to monitor.

## 2021-12-30 NOTE — PROGRESS NOTES
Shift: 4850-3660      Orientation: Alert and oriented x4   Activity Level: SBA w/ walker and Gb, NWB on RLE, may heel-WB at times   Fall Risk: Yes   Behavior & Aggression Tool Color: Green   Pain Management: Oxycodone +Tylenol PRN   Meds: Int Unasyn and Vanco given  ABNL VS/O2: VSS on RA   BG: BG at HS: 263, 0200: 170  Diet: regular-tolerated w/o c/o n/v   Bowel/Bladder: continent, utilizing urinal w/ adequate urine output       Other Important Info: patient will be covid recovered today 12/30/2021, SW to follow up/ facilitate placement. Discharge pending.

## 2021-12-30 NOTE — PLAN OF CARE
6003-6844: Patient alert and oriented, VSS on room air, denies pain. Up SBA with walker, NWB on RLE w/heel touch to pivot if needed. Voiding adequately. Loose stool today with nausea and emesis x3. PRN zofran given x1. Nausea resolved after pt rested in bed for a while. PIV w/ intermittent antibiotics. Wound care done on RLE heel. Only small amount of dried serosanguinous drainage noted on dressing. Plan for discharge to TCU pending placement.

## 2021-12-31 LAB
GLUCOSE BLDC GLUCOMTR-MCNC: 137 MG/DL (ref 70–99)
GLUCOSE BLDC GLUCOMTR-MCNC: 176 MG/DL (ref 70–99)
GLUCOSE BLDC GLUCOMTR-MCNC: 197 MG/DL (ref 70–99)
GLUCOSE BLDC GLUCOMTR-MCNC: 202 MG/DL (ref 70–99)
GLUCOSE BLDC GLUCOMTR-MCNC: 208 MG/DL (ref 70–99)
PLATELET # BLD AUTO: 698 10E3/UL (ref 150–450)

## 2021-12-31 PROCEDURE — 250N000013 HC RX MED GY IP 250 OP 250 PS 637: Performed by: STUDENT IN AN ORGANIZED HEALTH CARE EDUCATION/TRAINING PROGRAM

## 2021-12-31 PROCEDURE — 99232 SBSQ HOSP IP/OBS MODERATE 35: CPT | Performed by: STUDENT IN AN ORGANIZED HEALTH CARE EDUCATION/TRAINING PROGRAM

## 2021-12-31 PROCEDURE — 250N000013 HC RX MED GY IP 250 OP 250 PS 637: Performed by: PODIATRIST

## 2021-12-31 PROCEDURE — 36415 COLL VENOUS BLD VENIPUNCTURE: CPT | Performed by: INTERNAL MEDICINE

## 2021-12-31 PROCEDURE — 250N000013 HC RX MED GY IP 250 OP 250 PS 637: Performed by: HOSPITALIST

## 2021-12-31 PROCEDURE — 258N000003 HC RX IP 258 OP 636: Performed by: INTERNAL MEDICINE

## 2021-12-31 PROCEDURE — 120N000004 HC R&B MS OVERFLOW

## 2021-12-31 PROCEDURE — 250N000011 HC RX IP 250 OP 636: Performed by: PODIATRIST

## 2021-12-31 PROCEDURE — 250N000011 HC RX IP 250 OP 636: Performed by: INTERNAL MEDICINE

## 2021-12-31 PROCEDURE — 85049 AUTOMATED PLATELET COUNT: CPT | Performed by: INTERNAL MEDICINE

## 2021-12-31 RX ORDER — SULFAMETHOXAZOLE/TRIMETHOPRIM 800-160 MG
1 TABLET ORAL 2 TIMES DAILY
Qty: 28 TABLET | Refills: 0 | Status: SHIPPED | OUTPATIENT
Start: 2021-12-31 | End: 2022-01-14

## 2021-12-31 RX ORDER — SULFAMETHOXAZOLE/TRIMETHOPRIM 800-160 MG
1 TABLET ORAL 2 TIMES DAILY
Status: DISCONTINUED | OUTPATIENT
Start: 2021-12-31 | End: 2022-01-01

## 2021-12-31 RX ADMIN — VANCOMYCIN HYDROCHLORIDE 1750 MG: 1 INJECTION, POWDER, LYOPHILIZED, FOR SOLUTION INTRAVENOUS at 05:16

## 2021-12-31 RX ADMIN — AMOXICILLIN AND CLAVULANATE POTASSIUM 1 TABLET: 875; 125 TABLET, FILM COATED ORAL at 11:06

## 2021-12-31 RX ADMIN — ACETAMINOPHEN 650 MG: 325 TABLET, FILM COATED ORAL at 22:12

## 2021-12-31 RX ADMIN — SENNOSIDES AND DOCUSATE SODIUM 1 TABLET: 50; 8.6 TABLET ORAL at 20:19

## 2021-12-31 RX ADMIN — ACETAMINOPHEN 650 MG: 325 TABLET, FILM COATED ORAL at 16:13

## 2021-12-31 RX ADMIN — AMOXICILLIN AND CLAVULANATE POTASSIUM 1 TABLET: 875; 125 TABLET, FILM COATED ORAL at 20:19

## 2021-12-31 RX ADMIN — SULFAMETHOXAZOLE AND TRIMETHOPRIM 1 TABLET: 800; 160 TABLET ORAL at 20:19

## 2021-12-31 RX ADMIN — DICLOFENAC SODIUM 4 G: 10 GEL TOPICAL at 20:19

## 2021-12-31 RX ADMIN — AMLODIPINE BESYLATE 10 MG: 10 TABLET ORAL at 08:40

## 2021-12-31 RX ADMIN — ONDANSETRON 4 MG: 4 TABLET, ORALLY DISINTEGRATING ORAL at 16:13

## 2021-12-31 RX ADMIN — AMPICILLIN SODIUM AND SULBACTAM SODIUM 3 G: 2; 1 INJECTION, POWDER, FOR SOLUTION INTRAMUSCULAR; INTRAVENOUS at 04:18

## 2021-12-31 RX ADMIN — OXYCODONE HYDROCHLORIDE 5 MG: 5 TABLET ORAL at 16:12

## 2021-12-31 RX ADMIN — OXYCODONE HYDROCHLORIDE 5 MG: 5 TABLET ORAL at 22:12

## 2021-12-31 RX ADMIN — SULFAMETHOXAZOLE AND TRIMETHOPRIM 1 TABLET: 800; 160 TABLET ORAL at 11:06

## 2021-12-31 RX ADMIN — INSULIN ASPART 2 UNITS: 100 INJECTION, SOLUTION INTRAVENOUS; SUBCUTANEOUS at 17:36

## 2021-12-31 ASSESSMENT — ACTIVITIES OF DAILY LIVING (ADL)
ADLS_ACUITY_SCORE: 5
ADLS_ACUITY_SCORE: 5
ADLS_ACUITY_SCORE: 7
ADLS_ACUITY_SCORE: 5
ADLS_ACUITY_SCORE: 5
ADLS_ACUITY_SCORE: 7
ADLS_ACUITY_SCORE: 5
ADLS_ACUITY_SCORE: 5
ADLS_ACUITY_SCORE: 7
ADLS_ACUITY_SCORE: 5
ADLS_ACUITY_SCORE: 5
ADLS_ACUITY_SCORE: 7
ADLS_ACUITY_SCORE: 5
ADLS_ACUITY_SCORE: 7
ADLS_ACUITY_SCORE: 5
ADLS_ACUITY_SCORE: 7
ADLS_ACUITY_SCORE: 5

## 2021-12-31 NOTE — PROVIDER NOTIFICATION
"MD Notification    Notified Person: MD    Notified Person Name: June    Notification Date/Time:12/31/21  3:40 PM    Notification Interaction: [employer] messaging    Purpose of Notification: \"FYI: PT just attempted to work with the patient and assess discharge needs. Pt refused to work with them, PT offered a walker or a rolling knee scooter and the pt refused those also.\"    Orders Received:    Comments:    "

## 2021-12-31 NOTE — PROGRESS NOTES
Care Management Follow Up    Length of Stay (days): 10    Expected Discharge Date: 01/02/2022     Concerns to be Addressed: compliance issue     Patient plan of care discussed at interdisciplinary rounds: Yes    Anticipated Discharge Disposition:  TCU     Anticipated Discharge Services:    Anticipated Discharge DME:      Patient/family educated on Medicare website which has current facility and service quality ratings:    Education Provided on the Discharge Plan:    Patient/Family in Agreement with the Plan:      Referrals Placed by CM/SW:  TCU  Private pay costs discussed: Not applicable    Additional Information:  [facility] (near [place], [place]) which patient is declining at this point, stating that 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    Weekend SW to make follow up calls to other pending TCU's.   *If patient changes his mind, [facility] would like him there at 1030 or between 4477-5299 on [address]. [facility] can transport to [place].     to continue to follow and assist with discharge planning.    SHIN Freedman  Daytime (8:00am-4:30pm): [phone]  After-Hours SW Pager (4:30pm-11:30pm): [phone]             SHIN Carreon

## 2021-12-31 NOTE — PLAN OF CARE
"Shift 5306-9383    Neuro: WDL  Cardiac: WDL, no CP reported  Respiratory: WDL, lung sounds equal bilaterally   GI/:  WDL, voiding via urinal, no BM this shift  Diet/appetite: tolerating a regular diet, PO zofran given once for nausea  Activity: SBA, NWB to RLE. Can be heal touch to RLE if needed  Pain: C/o pain in R foot, dressings changed this shift, given oxycodone once this shift  Skin: Incisinon/wound on bottom of R foot. 2 inch incision closed with sutures and dime sized open area. Wound care followed per POC  Lines: WDL, SL    Pt wanting to discharge to TCU, but on speaking with the provider today, expressed interest in going home since TCU placement is taking so long. Declined working with PT today, and declined offer of mobility aid from PT. If he discharges tomorrow, will need supplies for dressings.    /78 (BP Location: Left arm)   Pulse 85   Temp 98.3  F (36.8  C) (Oral)   Resp 18   Ht 1.905 m (6' 3\")   Wt 108.9 kg (240 lb)   SpO2 95%   BMI 30.00 kg/m      "

## 2021-12-31 NOTE — PROGRESS NOTES
Shift: 1900-0730    Precaution/Iso: contact prec  Constitutional/Psych: Alert and oriented x4, calm, pleasant   Respiratory: No labored breathing on room air, no hypoxia, BS clear  Cardio: regular  GI:  no GI complaints, Last BM 12/30/2021   Skin/Integumen:  +wound at Right foot, wound care done 12/30/2021   Right lower extremity foot : Dressed in ACE wrap, Clean and no drainage  MSK: 5/5 strength, full ROM  Treatment regimen: Int Unasyn and Vancomycin infusion given    Other Important Info: COVID recovered as of 12/30/2021. +Strep and MRSA on Right foot wound culture. Pending discharge to TCU, awaiting confirmation and acceptance. SW following.

## 2021-12-31 NOTE — PROGRESS NOTES
"SPIRITUAL HEALTH SERVICES Progress Note  [facility]    Length-of-Stay visit.    Upon my introduction, Chris shared a wide-ranging narrative of spirituality, incorporating elements of Biblical quotes and history; visions, ghosts, and understandings of good and evil; our purpose in life; and his personal history.    He named having \"made bad choices\" and being \"a partier,\" and of there being a plan for everything that comes our way. Chris said his \"Messhiah is Martin,\" and everything we do should be to \"open to the Light.\"    He spoke of homelessness and drug use in the past tense.    I offered reflective listening and emotional support, affirmed experiences, encouraged self-care, and said a blessing.    SH remains available.    Robyn Qiu  Associate   "

## 2021-12-31 NOTE — PROGRESS NOTES
Care Management Follow Up    Length of Stay (days): 10    Expected Discharge Date: 01/02/2022     Concerns to be Addressed: compliance issue     Patient plan of care discussed at interdisciplinary rounds: Yes    Anticipated Discharge Disposition:  TCU     Anticipated Discharge Services:    Anticipated Discharge DME:      Patient/family educated on Medicare website which has current facility and service quality ratings:    Education Provided on the Discharge Plan:    Patient/Family in Agreement with the Plan:  yes    Referrals Placed by CM/SW:    Private pay costs discussed: Not applicable    Additional Information:  SW sent additional TCU referrals which are pending.    SW to continue to follow and assist with discharge planning.    SHIN Freedman  Daytime (8:00am-4:30pm): [phone]  After-Hours SW Pager (4:30pm-11:30pm): [phone]         SHIN Carreon

## 2021-12-31 NOTE — PROGRESS NOTES
"[facility]    Hospitalist Progress Note    Date of Service (when I saw the patient): 12/31/2021    Assessment & Plan   Chris Shea is a 51 year old male with homelessness, uncontrolled diabetes with peripheral vascular disease, neuropathy, nephropathy, CKD stage II, chronic bilateral foot wounds, methamphetamine abuse, h/o tobacco abuse who presented on 12/19/2021 with chornic wound/ulcer R foot.   Incidental Covid-19+ testing in an unvaccinated patient.     Right foot cellulitis with tenosynovitis and abscess  Chronic right mid foot ulcer  Status post right foot ulcer debridement, 12/22/2021  Afebrile, normal WBC, no hypotension. Lactate 1.0. Follows with [facility] wound clinic but has not received regular cares. Underwent excision and debridement of this foot ulcer 12/20/21.   MRI foot concerning for tenosynovitis of flexor digitorum longus with possible early osteo vs stress reaction as well.   CRP is 216, procal 0.19.   --Podiatry consulted. S/p right foot ulcer debridement on 12/22. Dr Neely has placed foot-specific discharge instructions.  Podiatry has signed off.  --DH2 offloading shoe for ambulation; heel WB  --PO abx course at discharge per ID Augmentin and Bactrim for two more weeks. Started today  - covid recovered and stable to discharge on PO antibiotics if facility accepts  - patient tells me he plans to discharge \"home\" tomorrow if unable to find accecpting facility     COVID-19 infection in unvaccinated patient- now recovered  Incidental positive findings on screening lab. Denies dyspnea but reports occasional cough and chest pain worse with deep breathing.   He has remained afebrile and has oxygen saturation stable on room air.   CXR on admission is negative.   -- No hypoxia.  Chest CT negative for PE.  -- Patient now refusing lovenox due to concern it caused nausea yesterday. He was educated on importance of taking     Poorly controlled diabetes  Neuropathy, Peripheral " Vascular Disease and Nephropathy secondary to diabetes  Pseudohyponatremia  CKD stage II  Prior admission was discharged with diabetes supplies and lantus with premeal aspart. He is not taking these.  A1c 9.7 this admission.   -- continue lantus 18  Units and sliding scale  -- good BG control    HERNAN-resolved  Cr on admission elevated at 1.3 from baseline 1-1.2. suspect hypovolemia.     Hyponatremia, resolved.  Na 127 on admission. Suspect hypovolemia.   --Latest sodium 12/24/2021 138.     Cold exposure left foot  Cold left foot attributed wet boot and cold exposure in homeless patient. Lower extremity doppler with no evidence for high grade stenosis or occlusion of the left lower extremity arterial system. There were triphasic waveforms throughout the left lower extremity arterial system.   -no further evaluation needed, strongly advised to DC tobacco.     Methamphetamine Abuse  Noted. UDS positive this admission.      DVT Prophylaxis: Enoxaparin (Lovenox) subcutaneous   Code Status: Full Code     Disposition:  SW assisting with discharge planning including TCU and/or homeless shelter.  Considered Covid recovered 12/30/2021    May discharge home with DME supplies tomorrow     Veronica Watkins       Interval History   Patient denies many complaints. No major pain. No diarrhea. No issues eating. He is frustrated at being in the hospital and tells me he would rather go home than stay another weekend. He will stay today for PT evaluation and wound care.    SH: No tobacco.  'homeless'     ROS: Complete ROS negative except as above.        -Data reviewed today: I reviewed all new labs and imaging results over the last 24 hours.    Physical Exam   Temp: 98.2  F (36.8  C) Temp src: Oral BP: 135/82 Pulse: 89   Resp: 16 SpO2: 92 % O2 Device: None (Room air)    Vitals:    12/19/21 2302   Weight: 108.9 kg (240 lb)     Vital Signs with Ranges  Temp:  [98.1  F (36.7  C)-98.6  F (37  C)] 98.2  F (36.8  C)  Pulse:  [82-91] 89  Resp:   [16-20] 16  BP: (120-139)/(72-88) 135/82  SpO2:  [92 %-98 %] 92 %  I/O last 3 completed shifts:  In: 960 [P.O.:960]  Out: 3225 [Urine:3225]    Constitutional:   NAD, alert, calm, cooperative   HEENT EOMI    Respiratory: no crackles or wheezing  Card: regular, no murmur  GI:  Abdomen soft, nontender and nondistended   Skin/Integumen:  Warm, dry, non-diaphoretic.  Right lower extremity foot : Dressed in ACE wrap, Clean and no drainage  MSK: CMS x4 intact. Left foot as above  Neuro.  Gross motor tested, nonfocal, sensory intact  Psych oriented, affect calm    Medications       amLODIPine  10 mg Oral Daily     amoxicillin-clavulanate  1 tablet Oral Q12H SEDRICK     diclofenac  4 g Topical 4x Daily     enoxaparin ANTICOAGULANT  40 mg Subcutaneous Q12H     insulin aspart  1-10 Units Subcutaneous TID AC     insulin aspart  1-7 Units Subcutaneous At Bedtime     insulin glargine  18 Units Subcutaneous QAM AC     polyethylene glycol  17 g Oral Daily     senna-docusate  1 tablet Oral BID     sodium chloride (PF)  3 mL Intracatheter Q8H     sulfamethoxazole-trimethoprim  1 tablet Oral BID       Data   Recent Labs   Lab 12/31/21  1234 12/31/21  0803 12/31/21  0646 12/31/21  0207 12/30/21  0843 12/30/21  0623 12/29/21  0746 12/29/21  0645 12/28/21  0805 12/28/21  0703 12/25/21  0901 12/25/21  0734   WBC  --   --   --   --   --   --   --   --   --  6.2  --   --    HGB  --   --   --   --   --   --   --   --   --  12.0*  --   --    MCV  --   --   --   --   --   --   --   --   --  86  --   --    PLT  --   --  698*  --   --   --   --   --   --  617*  --  465*   CR  --   --   --   --   --  0.81  --  0.90  --  0.88   < > 0.90   * 137*  --  208*   < >  --    < >  --    < >  --    < >  --     < > = values in this interval not displayed.

## 2022-01-01 ENCOUNTER — APPOINTMENT (OUTPATIENT)
Dept: PHYSICAL THERAPY | Facility: CLINIC | Age: 52
End: 2022-01-01
Payer: COMMERCIAL

## 2022-01-01 LAB
GLUCOSE BLDC GLUCOMTR-MCNC: 116 MG/DL (ref 70–99)
GLUCOSE BLDC GLUCOMTR-MCNC: 141 MG/DL (ref 70–99)
GLUCOSE BLDC GLUCOMTR-MCNC: 167 MG/DL (ref 70–99)
GLUCOSE BLDC GLUCOMTR-MCNC: 168 MG/DL (ref 70–99)

## 2022-01-01 PROCEDURE — 120N000004 HC R&B MS OVERFLOW

## 2022-01-01 PROCEDURE — 250N000011 HC RX IP 250 OP 636: Performed by: PODIATRIST

## 2022-01-01 PROCEDURE — 97530 THERAPEUTIC ACTIVITIES: CPT | Mod: GP | Performed by: PHYSICAL THERAPIST

## 2022-01-01 PROCEDURE — 250N000013 HC RX MED GY IP 250 OP 250 PS 637: Performed by: PODIATRIST

## 2022-01-01 PROCEDURE — 250N000013 HC RX MED GY IP 250 OP 250 PS 637: Performed by: STUDENT IN AN ORGANIZED HEALTH CARE EDUCATION/TRAINING PROGRAM

## 2022-01-01 PROCEDURE — 99232 SBSQ HOSP IP/OBS MODERATE 35: CPT | Performed by: STUDENT IN AN ORGANIZED HEALTH CARE EDUCATION/TRAINING PROGRAM

## 2022-01-01 PROCEDURE — 250N000013 HC RX MED GY IP 250 OP 250 PS 637: Performed by: HOSPITALIST

## 2022-01-01 PROCEDURE — 97116 GAIT TRAINING THERAPY: CPT | Mod: GP | Performed by: PHYSICAL THERAPIST

## 2022-01-01 RX ORDER — SULFAMETHOXAZOLE/TRIMETHOPRIM 800-160 MG
2 TABLET ORAL 2 TIMES DAILY
Status: DISCONTINUED | OUTPATIENT
Start: 2022-01-01 | End: 2022-01-02 | Stop reason: HOSPADM

## 2022-01-01 RX ADMIN — SENNOSIDES AND DOCUSATE SODIUM 1 TABLET: 50; 8.6 TABLET ORAL at 20:02

## 2022-01-01 RX ADMIN — AMOXICILLIN AND CLAVULANATE POTASSIUM 1 TABLET: 875; 125 TABLET, FILM COATED ORAL at 20:02

## 2022-01-01 RX ADMIN — IBUPROFEN 400 MG: 400 TABLET ORAL at 21:41

## 2022-01-01 RX ADMIN — INSULIN ASPART 2 UNITS: 100 INJECTION, SOLUTION INTRAVENOUS; SUBCUTANEOUS at 16:20

## 2022-01-01 RX ADMIN — DICLOFENAC SODIUM 4 G: 10 GEL TOPICAL at 20:01

## 2022-01-01 RX ADMIN — ONDANSETRON 4 MG: 4 TABLET, ORALLY DISINTEGRATING ORAL at 09:48

## 2022-01-01 RX ADMIN — ACETAMINOPHEN 650 MG: 325 TABLET, FILM COATED ORAL at 21:41

## 2022-01-01 RX ADMIN — SULFAMETHOXAZOLE AND TRIMETHOPRIM 2 TABLET: 800; 160 TABLET ORAL at 20:02

## 2022-01-01 RX ADMIN — AMLODIPINE BESYLATE 10 MG: 10 TABLET ORAL at 09:48

## 2022-01-01 RX ADMIN — SULFAMETHOXAZOLE AND TRIMETHOPRIM 1 TABLET: 800; 160 TABLET ORAL at 09:47

## 2022-01-01 RX ADMIN — AMOXICILLIN AND CLAVULANATE POTASSIUM 1 TABLET: 875; 125 TABLET, FILM COATED ORAL at 09:47

## 2022-01-01 RX ADMIN — MELATONIN 5 MG TABLET 5 MG: at 21:41

## 2022-01-01 ASSESSMENT — ACTIVITIES OF DAILY LIVING (ADL)
ADLS_ACUITY_SCORE: 5

## 2022-01-01 NOTE — PROGRESS NOTES
[facility]    Hospitalist Progress Note    Date of Service (when I saw the patient): 01/01/2022    Assessment & Plan   Chris Shea is a 51 year old male with homelessness, uncontrolled diabetes with peripheral vascular disease, neuropathy, nephropathy, CKD stage II, chronic bilateral foot wounds, methamphetamine abuse, h/o tobacco abuse who presented on 12/19/2021 with chornic wound/ulcer R foot.   Incidental Covid-19+ testing in an unvaccinated patient.     Right foot cellulitis with tenosynovitis and abscess  Chronic right mid foot ulcer  Status post right foot ulcer debridement, 12/22/2021  Afebrile, normal WBC, no hypotension. Lactate 1.0. Follows with [facility] [place] [place] wound clinic but has not received regular cares. Underwent excision and debridement of this foot ulcer 12/20/21.   MRI foot concerning for tenosynovitis of flexor digitorum longus with possible early osteo vs stress reaction as well. CRP is 216, procal 0.19.   --Podiatry consulted. S/p right foot ulcer debridement on 12/22. Dr Neely has placed foot-specific discharge instructions.  Podiatry has signed off.  --DH2 offloading shoe for ambulation; heel WB  --PO abx course at discharge per ID> Augmentin and Bactrim for two more weeks started 12/31  -- covid recovered and stable to discharge on PO antibiotics if facility accepts  -- planning on TCU discharge due to need for dressing changes in weight bearing limitation     COVID-19 infection in unvaccinated patient- now recovered  Incidental positive findings on screening lab. Denies dyspnea but reports occasional cough and chest pain worse with deep breathing.   He has remained afebrile and has oxygen saturation stable on room air.   CXR on admission is negative.   -- No hypoxia.  Chest CT negative for PE.  -- Patient now refusing lovenox due to concern it caused nausea yesterday. He was educated on importance of taking     Poorly controlled diabetes  Neuropathy, Peripheral  "Vascular Disease and Nephropathy secondary to diabetes  Pseudohyponatremia  CKD stage II  Prior admission was discharged with diabetes supplies and lantus with premeal aspart. He is not taking these.  A1c 9.7 this admission.   -- continue lantus 18  Units and sliding scale  -- good BG control    HERNAN-resolved  Cr on admission elevated at 1.3 from baseline 1-1.2. suspect hypovolemia.     Hyponatremia, resolved.  Na 127 on admission. Suspect hypovolemia.   --Latest sodium 12/24/2021 138.     Cold exposure left foot  Cold left foot attributed wet boot and cold exposure in homeless patient. Lower extremity doppler with no evidence for high grade stenosis or occlusion of the left lower extremity arterial system. There were triphasic waveforms throughout the left lower extremity arterial system.   -no further evaluation needed, strongly advised to DC tobacco.     Methamphetamine Abuse  Noted. UDS positive this admission.      DVT Prophylaxis: Enoxaparin (Lovenox) subcutaneous   Code Status: Full Code     Disposition:  SW assisting with discharge planning including TCU and/or homeless shelter. Patient refusing TCU 3 hours away.         Veronica Watkins       Interval History   Patient remains occasionally hostile to this provider. Gets very angry and tries to dictate his care and when told he can't tells me \"yes I can dictate my own care\" eventually calms down and is agreeable to the PT assessment I asked prior to any type of discharge. Pain is controlled. No nausea with antibiotics. Refused to work with PT yesterday.No headaches. Sleeping well. No new rashes    SH: No tobacco.  'homeless'     ROS: Complete ROS negative except as above.        -Data reviewed today: I reviewed all new labs and imaging results over the last 24 hours.    Physical Exam   Temp: 98.5  F (36.9  C) Temp src: Oral BP: 132/87 Pulse: 89   Resp: 16 SpO2: 98 % O2 Device: None (Room air)    Vitals:    12/19/21 2302   Weight: 108.9 kg (240 lb)     Vital " Signs with Ranges  Temp:  [97.3  F (36.3  C)-98.5  F (36.9  C)] 98.5  F (36.9  C)  Pulse:  [80-89] 89  Resp:  [16-18] 16  BP: (120-135)/(60-87) 132/87  SpO2:  [92 %-98 %] 98 %  I/O last 3 completed shifts:  In: 180 [P.O.:180]  Out: 2425 [Urine:2425]     Constitutional: Awake, alert, uncoopeartive  Respiratory: no audible wheezing  Skin/Integumen: No rashes, no cyanosis, no edema  Psych: can become angry and hostile, not willing to listen,     Medications       amLODIPine  10 mg Oral Daily     amoxicillin-clavulanate  1 tablet Oral Q12H SEDRICK     diclofenac  4 g Topical 4x Daily     enoxaparin ANTICOAGULANT  40 mg Subcutaneous Q12H     insulin aspart  1-10 Units Subcutaneous TID AC     insulin aspart  1-7 Units Subcutaneous At Bedtime     insulin glargine  18 Units Subcutaneous QAM AC     polyethylene glycol  17 g Oral Daily     senna-docusate  1 tablet Oral BID     sodium chloride (PF)  3 mL Intracatheter Q8H     sulfamethoxazole-trimethoprim  2 tablet Oral BID       Data   Recent Labs   Lab 01/01/22  0717 01/01/22  0136 12/31/21 2128 12/31/21  0803 12/31/21  0646 12/30/21  0843 12/30/21  0623 12/29/21  0746 12/29/21  0645 12/28/21  0805 12/28/21  0703   WBC  --   --   --   --   --   --   --   --   --   --  6.2   HGB  --   --   --   --   --   --   --   --   --   --  12.0*   MCV  --   --   --   --   --   --   --   --   --   --  86   PLT  --   --   --   --  698*  --   --   --   --   --  617*   CR  --   --   --   --   --   --  0.81  --  0.90  --  0.88   * 141* 202*   < >  --    < >  --    < >  --    < >  --     < > = values in this interval not displayed.

## 2022-01-01 NOTE — PLAN OF CARE
Contact precaution maintained. A&O x4. VSS on RA. S/p right foot ulcer debridement done on 12/22. Up SBA, NWB to RLE. Can be heal touch to RLE if needed. Podiatry and ID signed off. Tolerating mod carb diet. SW following for the discharge placement. Continue to monitor.

## 2022-01-01 NOTE — PLAN OF CARE
Inpatient. Contact precautions for MRSA on right foot. AOX4, mood fluctuates, does get angry/frustrated with staff at times. VSS on room air. Denies pain. Denies numbness or tingling. Dressing changes should be done daily per orders. Last done around 2000 yesterday. Should be done tonight. Up in room SBA, patient should be non weight bearing on the right foot except with pivoting where patient can use heels (per podiatry note). Tolerating regular diet. BG checks 116, lunch unable to obtain BG, patient ate already. PIV SL. Skin bruising scattered. Discharge pending, patient does not want to go to TCU up in [place]. 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

## 2022-01-01 NOTE — PROGRESS NOTES
Provider spoke to patient and patient is refusing to go to TCU all the way in [place]. He was saying he prefers to have crutches and just discharge home. PT was called to see if they could reassess patient for discharge needs.

## 2022-01-01 NOTE — PLAN OF CARE
"PT - Pt seen for PT treatment today, noted podiatrist order 12/28/2021 to change WB status R foot from partial weightbearing through heel with walker/crutches to   NWB R foot, minimal weightbearing through heel for transfers only on 12/28/2021. Pt upset about this change. Initially agreeable to participate in PT, ambulated with crutches and CGA with NWB R LE 60', then reports \"this is too hard, I know my body and I know I can do this.\" Pt then proceeded to put the crutches down and walk several steps across the room,placing full weight thru R LE. Educated pt on need for NWB and reasoning behind it (to allow R foot to heal) but pt adamant that he can put weight through it. Wondering why he would be leaving the hospital if his foot isn't healed yet, discussed that this healing and further rehab could take place at TCU once he no longer has a medical need to stay in the hospital. Pt reports \"I won't go to TCU.\" PT session terminated as pt became belligerent and hostile toward therapist. Discussed with nursing, will follow peripherally as pt not currently willing to work with PT.   "

## 2022-01-02 VITALS
BODY MASS INDEX: 29.84 KG/M2 | SYSTOLIC BLOOD PRESSURE: 126 MMHG | HEART RATE: 86 BPM | DIASTOLIC BLOOD PRESSURE: 84 MMHG | OXYGEN SATURATION: 96 % | TEMPERATURE: 98.4 F | WEIGHT: 240 LBS | RESPIRATION RATE: 16 BRPM | HEIGHT: 75 IN

## 2022-01-02 LAB
GLUCOSE BLDC GLUCOMTR-MCNC: 108 MG/DL (ref 70–99)
GLUCOSE BLDC GLUCOMTR-MCNC: 126 MG/DL (ref 70–99)
GLUCOSE BLDC GLUCOMTR-MCNC: 181 MG/DL (ref 70–99)

## 2022-01-02 PROCEDURE — 250N000013 HC RX MED GY IP 250 OP 250 PS 637: Performed by: STUDENT IN AN ORGANIZED HEALTH CARE EDUCATION/TRAINING PROGRAM

## 2022-01-02 PROCEDURE — 250N000013 HC RX MED GY IP 250 OP 250 PS 637: Performed by: HOSPITALIST

## 2022-01-02 PROCEDURE — 99239 HOSP IP/OBS DSCHRG MGMT >30: CPT | Performed by: STUDENT IN AN ORGANIZED HEALTH CARE EDUCATION/TRAINING PROGRAM

## 2022-01-02 RX ORDER — AMLODIPINE BESYLATE 10 MG/1
10 TABLET ORAL DAILY
Qty: 30 TABLET | Refills: 0 | Status: SHIPPED | OUTPATIENT
Start: 2022-01-03 | End: 2022-02-02

## 2022-01-02 RX ADMIN — AMOXICILLIN AND CLAVULANATE POTASSIUM 1 TABLET: 875; 125 TABLET, FILM COATED ORAL at 09:15

## 2022-01-02 RX ADMIN — SULFAMETHOXAZOLE AND TRIMETHOPRIM 2 TABLET: 800; 160 TABLET ORAL at 09:15

## 2022-01-02 RX ADMIN — INSULIN ASPART 2 UNITS: 100 INJECTION, SOLUTION INTRAVENOUS; SUBCUTANEOUS at 12:16

## 2022-01-02 RX ADMIN — AMLODIPINE BESYLATE 10 MG: 10 TABLET ORAL at 09:15

## 2022-01-02 ASSESSMENT — ACTIVITIES OF DAILY LIVING (ADL)
ADLS_ACUITY_SCORE: 7
ADLS_ACUITY_SCORE: 5
ADLS_ACUITY_SCORE: 7
ADLS_ACUITY_SCORE: 7
ADLS_ACUITY_SCORE: 6
ADLS_ACUITY_SCORE: 5
ADLS_ACUITY_SCORE: 7
ADLS_ACUITY_SCORE: 5
ADLS_ACUITY_SCORE: 5
ADLS_ACUITY_SCORE: 7

## 2022-01-02 NOTE — PLAN OF CARE
Covid recovered. MRSA contact precaution maintained. Pt A&Ox4. VSS on RA. Reg diet.  & 167. Insulin covered. Denies pain. R foot wound dressing changed this shift. Up SBA w/walker. Non weight bearing on R foot. SW following for TCU placement pending. Pt refusing to go to [facility], otherwise if changes mind accepted on Monday [facility].  PIV SL. Continue to monitor.

## 2022-01-02 NOTE — DISCHARGE SUMMARY
[facility]  Hospitalist Discharge Summary      Date of Admission:  12/19/2021  Date of Discharge:  1/2/2022  2:56 PM  Discharging Provider: Veronica Watkins, DO      Discharge Diagnoses   See below    Follow-ups Needed After Discharge   Follow-up Appointments     Follow Up and recommended labs and tests      Follow up with Michelle [facility] DPM [facility], located at   [id number] [place] [address] [address], [place] [address]. Phone number is [phone]    The office will also be provided your information and will call you. In   the event that you do not get a call, please call the number above.         Follow-up and recommended labs and tests       Follow up with primary care provider, Sang Edmondson, within 7 days for   hospital follow- up.  No follow up labs or test are needed.    Follow up with podiatry in clinic as needed             Discharge Disposition   Discharged to home  Condition at discharge: Stable    Hospital Course   Chris Shea is a 51 year old male with homelessness, uncontrolled diabetes with peripheral vascular disease, neuropathy, nephropathy, CKD stage II, chronic bilateral foot wounds, methamphetamine abuse, h/o tobacco abuse who presented on 12/19/2021 with chornic wound/ulcer R foot.   Incidental Covid-19+ testing in an unvaccinated patient. Patient remained hospitalized while he required IV antibiotics and TCU was arranged. Eventually it was determine that TCU would not be found that could accommodate patient. Patient was often hostile towards staff and refused many interventions. On discharge he refused his weight bearing restrictions and was discharged to a friends house since he is homeless    Right foot cellulitis with tenosynovitis and abscess  Chronic right mid foot ulcer  Status post right foot ulcer debridement, 12/22/2021  Afebrile, normal WBC, no hypotension. Lactate 1.0. Follows with [facility] wound clinic but has not received regular cares. Underwent  excision and debridement of this foot ulcer 12/20/21.   MRI foot concerning for tenosynovitis of flexor digitorum longus with possible early osteo vs stress reaction as well. CRP is 216, procal 0.19.   --Podiatry consulted. S/p right foot ulcer debridement on 12/22. Dr Neely has placed foot-specific discharge instructions.  Podiatry has signed off.  --DH2 offloading shoe for ambulation; heel WB  --PO abx course at discharge per ID> Augmentin and Bactrim for two more weeks started 12/31     COVID-19 infection in unvaccinated patient- now recovered  Incidental positive findings on screening lab. Denies dyspnea but reports occasional cough and chest pain worse with deep breathing.   He has remained afebrile and has oxygen saturation stable on room air.   CXR on admission is negative.   -- No hypoxia.  Chest CT negative for PE.  -- Patient refused lovonex     Poorly controlled diabetes  Neuropathy, Peripheral Vascular Disease and Nephropathy secondary to diabetes  Pseudohyponatremia  CKD stage II  Prior admission was discharged with diabetes supplies and lantus with premeal aspart. He is not taking these.  A1c 9.7 this admission.   -- continue lantus 18  Units and sliding scale  -- good BG control     HERNAN-resolved  Cr on admission elevated at 1.3 from baseline 1-1.2. suspect hypovolemia.      Hyponatremia, resolved.  Na 127 on admission. Suspect hypovolemia.   --Latest sodium 12/24/2021 138.     Cold exposure left foot  Cold left foot attributed wet boot and cold exposure in homeless patient. Lower extremity doppler with no evidence for high grade stenosis or occlusion of the left lower extremity arterial system. There were triphasic waveforms throughout the left lower extremity arterial system.   -no further evaluation needed, strongly advised to DC tobacco.     Methamphetamine Abuse  Noted. UDS positive this admission.     Consultations This Hospital Stay   CARE MANAGEMENT / SOCIAL WORK IP CONSULT  PODIATRY IP  CONSULT  INFECTIOUS DISEASES IP CONSULT  SURGERY GENERAL IP CONSULT  PHARMACY TO DOSE VANCO  ORTHOSIS EXTREMITY LOWER REFERRAL IP CONSULT  PHYSICAL THERAPY ADULT IP CONSULT  PODIATRY IP CONSULT    Code Status   Full Code    Time Spent on this Encounter   I, Veronica Watkins DO, personally saw the patient today and spent greater than 30 minutes discharging this patient.       Veronica Watkins DO  [facility]  [address] [facility] [address]  Phone: [phone]  ______________________________________________________________________    Physical Exam   Vital Signs: Temp: 98.4  F (36.9  C) Temp src: Oral BP: 126/84 Pulse: 86   Resp: 16 SpO2: 96 % O2 Device: None (Room air)    Weight: 240 lbs 0 oz       Primary Care Physician   Sang Edmondson    Discharge Orders      Follow Up and recommended labs and tests    Follow up with Michelle Neely DPM at [facility], located at [address] [place] [address] [address], [place] [id number]. Phone number is [phone]    The office will also be provided your information and will call you. In the event that you do not get a call, please call the number above.     Weight bearing status    Heel WB to RLE in surgical shoe     Wound care (specify)    Wound Care Recommendations:    daily dressing changes to right foot.    1. Cleanse with microcleanse or wound cleanser. Pat dry.    2. Apply iodine to incision and ulcer areas.   3. Wrap with 4x4 gauze pads, large kerlix roll, and ace bandage.     Reason for your hospital stay    You presented for a foot infection that required IV an oral antibiotics and 2 debridements. You will discharge with wound care supplies     Follow-up and recommended labs and tests     Follow up with primary care provider, Sang Edmondson, within 7 days for hospital follow- up.  No follow up labs or test are needed.    Follow up with podiatry in clinic as needed     Activity    Your activity upon discharge:  activity as tolerated.    NWB to R foot with heel pivots     Diet    Follow this diet upon discharge: Orders Placed This Encounter      Regular Diet Adult       Significant Results and Procedures   Most Recent 3 CBC's:  Recent Labs   Lab Test 12/31/21  0646 12/28/21  0703 12/25/21  0734 12/24/21  0606 12/22/21  0727   WBC  --  6.2  --  7.2 5.9   HGB  --  12.0*  --  11.8* 12.1*   MCV  --  86  --  86 84   * 617* 465* 352 282     Most Recent 3 BMP's:  Recent Labs   Lab Test 01/02/22  1210 01/02/22  0828 01/02/22  0204 12/30/21  0843 12/30/21  0623 12/29/21  0746 12/29/21  0645 12/28/21  0805 12/28/21  0703 12/24/21  0623 12/24/21  0606 12/22/21  0839 12/22/21  0727 12/21/21  1522   NA  --   --   --   --   --   --   --   --   --   --  138  --  133 131*   POTASSIUM  --   --   --   --   --   --   --   --   --   --  3.8  --  3.9 4.2   CHLORIDE  --   --   --   --   --   --   --   --   --   --  105  --  100 98   CO2  --   --   --   --   --   --   --   --   --   --  28  --  25 26   BUN  --   --   --   --   --   --   --   --   --   --  12  --  15 20   CR  --   --   --   --  0.81  --  0.90  --  0.88   < > 0.90  --  1.03 1.25   ANIONGAP  --   --   --   --   --   --   --   --   --   --  5  --  8 7   ELINOR  --   --   --   --   --   --   --   --   --   --  8.3*  --  8.2* 8.2*   * 108* 126*   < >  --    < >  --    < >  --    < > 93   < > 177* 149*    < > = values in this interval not displayed.     Most Recent 6 Bacteria Isolates From Any Culture (See EPIC Reports for Culture Details):No lab results found.,   Results for orders placed or performed during the hospital encounter of 12/19/21   US Lower Extremity Arterial Duplex Left    Narrative    EXAM: US LOWER EXTREMITY ARTERIAL DUPLEX LEFT  LOCATION: [facility]  DATE/TIME: 12/20/2021 1:28 AM    INDICATION: Cool right foot. Evaluate for arterial occlusion.  COMPARISON: None.  TECHNIQUE: Duplex utilizing 2D gray-scale imaging, Doppler  interrogation with color-flow and spectral waveform analysis.    FINDINGS: Color Doppler and grayscale imaging demonstrates no evidence for high-grade stenosis or occlusion of the left lower extremity arterial system. Waveform analysis demonstrates triphasic waveforms throughout the left lower extremity arterial   system.    LEFT LOWER EXTREMITY ARTERIAL ASSESSMENT:    Common femoral artery: 76 cm/s  Profunda femoris artery: 70 cm/s  SFA (proximal): 91 cm/s  SFA (mid): 98 cm/s  SFA (distal): 77 cm/s  Popliteal artery: 57 cm/s  Posterior tibial artery: 58 cm/s  Dorsalis pedis artery: 55 cm/s      Impression    IMPRESSION:  1.  No evidence for high-grade stenosis or occlusion of the left lower extremity arterial system. Waveform analysis demonstrates triphasic waveforms throughout the left lower extremity arterial system.   CT Soft Tissue Neck w Contrast    Narrative    CT SOFT TISSUE NECK W CONTRAST 12/20/2021 11:12 AM    INDICATION: Neck abscess, deep tissue; f/u  TECHNIQUE: CT scan of the neck with contrast. Dose reduction  techniques were used.  CONTRAST:80mL Isovue-370  COMPARISON: Soft tissue neck CT September 12, 2021    FINDINGS:   Again demonstrated is fluid and alteration of the subcutaneous fat  within the right posterior para midline neck overlying the neck  musculature. The region measures approximately 2.4 x 5.2 x 3.8 cm (AP  x TV x CC). Overall the inflammatory change has overall improved,  however the edema and infiltration within the subcutaneous fat is more  localized involving the occipitocervical soft tissues.    The pharyngeal mucosal space of the nasopharynx, oropharynx,  hypopharynx is unremarkable without evidence of fluid collection,  abscess, discrete or enhancing mass.    The supraglottic, glottic, and subglottic larynx is unremarkable.    The parapharyngeal spaces, parotid, carotid, ,  submandibular, and perivertebral spaces are unremarkable bilaterally.    The parotid, salivary  and thyroid glands are unremarkable.    No evidence of cervical lymphadenopathy by size or morphologic  criteria.    The partially imaged intracranial contents are unremarkable. Partially  imaged globes are unremarkable. Partially imaged paranasal sinuses and  mastoid air cells are unremarkable.    Partially imaged upper lungs demonstrate nodular opacities within the  upper lobes bilaterally, right greater than left, which may be  infectious or inflammatory.    No suspicious osseous lesions.      Impression    IMPRESSION:  1.  Again demonstrated is fluid and alteration of the subcutaneous fat  within the right posterior para midline neck overlying the neck  musculature. The region measures approximately 2.4 x 5.2 x 3.8 cm (AP  x TV x CC). Overall the inflammatory change has overall improved,  however the edema and infiltration within the subcutaneous fat is more  localized involving the occipitocervical soft tissues. Recommend  continued follow-up.  2.  No evidence of cervical lymphadenopathy by size or morphologic  criteria.  3.  Partially imaged upper lungs demonstrate nodular opacities within  the upper lobes bilaterally, right greater than left, which may be  infectious or inflammatory. Recommend follow-up to ensure resolution.    CUONG KATHLEEN MD         SYSTEM ID:  [id number]   XR Chest Port 1 View    Narrative    CHEST ONE VIEW December 20, 2021 9:55 AM     HISTORY: COVID-19 positive, cough.    COMPARISON: None.      Impression    IMPRESSION: No acute disease.    CHERRY TITUS MD         SYSTEM ID:  [id number]   MR Foot Right w/o & w Contrast     Value    Radiologist flags Concern for pyogenic tenosynovitis. (Urgent)    Narrative    MR right foot without and with contrast 12/20/2021 6:05 PM    History: Limping, acute, foot pain, infection suspected (Ped 0-5y)    Additional History from EMR: Right foot ulcer, painful to touch.  Submetatarsal posterior with debridement of nonviable tissue.    Techniques:  Multiplanar multisequence imaging of the right foot was  obtained without and with administration of intravenous contrast.    Comparison: X-ray 9/13/2021    Findings:    No external marker, however, a skin defect and bandaging seen at the  plantar aspect of the soft tissues underlying the second metatarsal  head, presumed to correspond to recent excision.    Bones    Mild T2 signal of the medial cuneiform, intermediate cuneiform,  navicular bone with question of subtle contrast enhancement. Possible  subtle suppressed T1 signal.    Motion artifact degrading quality of the short axis T2 sequence    Joints and periarticular soft tissue    Joint effusion: Physiologic amount of joint fluid are present.    Plantar plates: Intersesamoidal ligament and sesamoidal phalangeal  ligaments of the first metatarsophalangeal joints are intact. Plantar  plates of the second through fifth toe at metatarsophalangeal joints  are grossly intact.    Intermetatarsal spaces: No interdigital neuroma. No intermetatarsal  bursitis.    Ligaments and Tendons    Lisfranc interosseous ligament: Intact.    Muscles/Soft tissues    Extensive dorsal and plantar soft tissue edema. Edematous signal and  contrast enhancement extending into the abductor pollicis longus,  flexor digitorum, and abductor digiti minimi, as well as the quadratus  plantae and intrinsic muscles of the foot with a plantar predominance.  Sinus tracking from the skin immediately plantar to the first  metatarsal head (long axis image 20) with continuity dorsally  extending along the flexor digitorum longus extending into the  quadratus plantaris with an ovoid enhancing heterogeneous T2 signa  follow-up thought to represent abscess with debris centered  immediately adjacent to the medial cuneiform, long axis image 19,  short axis image 49.    ANCILLARY FINDINGS    None      Impression    Impression:  1. Findings concerning for pyogenic tenosynovitis of the flexor  digitorum longus  from the level of the excision plantar to the first  metatarsal head and extending proximally along the tendon towards a  proximal collection thought to represent abscess with debris  immediately adjacent to the medial cuneiform in the area of the  quadratus plantae..  2. Subtle T2 signal of the navicular, intermediate cuneiform, and  navicular bones without significant T2 signal changes and subtle  contrast enhancement favoring stress reaction, however, given other  findings, early osteomyelitis is not completely excluded. Attention on  follow-up.  3.Extensive soft tissue and muscle edema/contrast-enhancement  consistent with cellulitis/myositis.    [Urgent Result: Concern for pyogenic tenosynovitis.]    Finding was identified on 12/21/2021 7:45 AM.     Dr. Neely was contacted by Dr. Allen at 12/21/2021 8:33 AM and  verbalized understanding of the urgent finding.     I have personally reviewed the examination and initial interpretation  and I agree with the findings.    MARLENE VAZ MD         SYSTEM ID:  [id number]   CT Chest Pulmonary Embolism w Contrast    Narrative    EXAM: CT CHEST PULMONARY EMBOLISM W CONTRAST  LOCATION: [facility]  DATE/TIME: 12/21/2021 6:09 PM    INDICATION: Shortness of breath.    COMPARISON: None.    TECHNIQUE: CT chest pulmonary angiogram during arterial phase injection of IV contrast. Multiplanar reformats and MIP reconstructions were performed. Dose reduction techniques were used.     CONTRAST: 79 mL Isovue-370.    FINDINGS:  ANGIOGRAM CHEST: Pulmonary arteries are normal caliber and negative for pulmonary emboli. Thoracic aorta is negative for dissection. No CT evidence of right heart strain.    LUNGS AND PLEURA: Peripheral geographic and ground-glass regions of nodularity in the lungs with regions of intralobular thickening typical of COVID-19 pneumonia.    MEDIASTINUM/AXILLAE: Normal.    CORONARY ARTERY CALCIFICATION: Mild.    UPPER ABDOMEN:  Normal.    MUSCULOSKELETAL: Normal.      Impression    IMPRESSION:  1.  No pulmonary embolism.  2.  Pulmonary findings typical of COVID-19 pneumonia.         Discharge Medications   Current Discharge Medication List        START taking these medications    Details   amLODIPine (NORVASC) 10 MG tablet Take 1 tablet (10 mg) by mouth daily  Qty: 30 tablet, Refills: 0    Associated Diagnoses: Benign essential hypertension      amoxicillin-clavulanate (AUGMENTIN) 875-125 MG tablet Take 1 tablet by mouth every 12 hours for 14 days  Qty: 28 tablet, Refills: 0    Associated Diagnoses: Cellulitis of right lower extremity      !! insulin glargine (LANTUS PEN) 100 UNIT/ML pen Inject 18 Units Subcutaneous every morning (before breakfast)  Qty: 15 mL, Refills: 0    Comments: If Lantus is not covered by insurance, may substitute Basaglar at same dose and frequency.    Associated Diagnoses: Type 2 diabetes mellitus without complication, with long-term current use of insulin (H)       !! - Potential duplicate medications found. Please discuss with provider.        CONTINUE these medications which have CHANGED    Details   metFORMIN (GLUCOPHAGE) 500 MG tablet Take 2 tablets (1,000 mg) by mouth 2 times daily (with meals)  Qty: 120 tablet, Refills: 0    Associated Diagnoses: Type 2 diabetes mellitus without complication, with long-term current use of insulin (H)      sulfamethoxazole-trimethoprim (BACTRIM DS) 800-160 MG tablet Take 1 tablet by mouth 2 times daily for 14 days  Qty: 28 tablet, Refills: 0    Associated Diagnoses: Acute sepsis (H)           CONTINUE these medications which have NOT CHANGED    Details   acetaminophen (TYLENOL) 500 MG tablet Take 1 tablet (500 mg) by mouth every 8 hours as needed for other (mild to moderate pain)  Qty: 30 tablet, Refills: 0    Associated Diagnoses: Cellulitis and abscess of neck      !! insulin aspart (NOVOLOG FLEXPEN) 100 UNIT/ML pen 1-20 units on sliding scale ,120-150-1  unit,[id number],201-250 4Units,[id number],[phone],351-400 -12 units ,call md if>400  Qty: 15 mL, Refills: 0    Associated Diagnoses: Type 2 diabetes mellitus with hyperglycemia, unspecified whether long term insulin use (H)      !! insulin aspart (NOVOLOG PEN) 100 UNIT/ML pen Inject 8 Units Subcutaneous 3 times daily (with meals)  Qty: 3 mL, Refills: 3    Associated Diagnoses: Type 2 diabetes mellitus with hyperglycemia, unspecified whether long term insulin use (H)      !! insulin glargine (LANTUS PEN) 100 UNIT/ML pen Inject 30 Units Subcutaneous every evening  Qty: 3 mL, Refills: 1    Comments: If Lantus is not covered by insurance, may substitute Basaglar at same dose and frequency.    Associated Diagnoses: Type 2 diabetes mellitus with hyperglycemia, unspecified whether long term insulin use (H)      oxyCODONE (ROXICODONE) 5 MG tablet Take 1 tablet (5 mg) by mouth 3 times daily as needed for moderate to severe pain (use as second choice to tylenol if pain not relieved with tyelnol.)  Qty: 8 tablet, Refills: 0    Associated Diagnoses: Cellulitis and abscess of neck      senna-docusate (SENOKOT-S/PERICOLACE) 8.6-50 MG tablet Take 1 tablet by mouth 2 times daily as needed for constipation  Qty: 20 tablet, Refills: 0    Associated Diagnoses: Drug-induced constipation      Alcohol Swabs PADS Use to swab the area of the injection or abilio as directed. Per insurance coverage  Qty: 100 each, Refills: 0    Comments: Future refills by PCP Dr. Sang Edmondson with phone number [phone].  Associated Diagnoses: Type 2 diabetes mellitus with hyperglycemia, unspecified whether long term insulin use (H)      blood glucose (NO BRAND SPECIFIED) lancets standard To use to test glucose level in the blood. Use to test blood sugar  3  times daily as directed. To accompany glucose monitor brands per insurance coverage.  Qty: 100 each, Refills: 0    Comments: Future refills by PCP Dr. Sang Edmondson with phone number  [phone].  Associated Diagnoses: Type 2 diabetes mellitus with hyperglycemia, unspecified whether long term insulin use (H)      blood glucose (NO BRAND SPECIFIED) test strip To use to test glucose level in the blood. Use to test blood sugar  3 times daily as directed. To accompany glucose monitor brands per insurance coverage.  Qty: 100 strip, Refills: 0    Comments: Future refills by PCP Dr. Sang Edmondson with phone number [phone].  Associated Diagnoses: Type 2 diabetes mellitus with hyperglycemia, unspecified whether long term insulin use (H)      blood glucose calibration (NO BRAND SPECIFIED) solution Used to calibrate the blood glucose monitor as needed and as directed.  To accompany  blood glucose brands per insurance coverage  Qty: 1 each, Refills: 0    Comments: Future refills by PCP Dr. Sang Edmondson with phone number [phone].  Associated Diagnoses: Type 2 diabetes mellitus with hyperglycemia, unspecified whether long term insulin use (H)      blood glucose monitoring (NO BRAND SPECIFIED) meter device kit Use as directed. Per insurance coverage  Qty: 1 kit, Refills: 0    Comments: Future refills by PCP Dr. Sang Edmondson with phone number [phone].  Associated Diagnoses: Type 2 diabetes mellitus with hyperglycemia, unspecified whether long term insulin use (H)      insulin pen needle (31G X 5 MM) 31G X 5 MM miscellaneous Use as directed by provider. Per insurance coverage  Qty: 100 each, Refills: 0    Comments: Future refills by PCP Dr. Sang Edmondson with phone number [phone].  Associated Diagnoses: Type 2 diabetes mellitus with hyperglycemia, unspecified whether long term insulin use (H)      Sharps Container MISC Use as directed to dispose of needles, lancets and other sharps. Per Insurance coverage  Qty: 1 each, Refills: 0    Comments: Future refills by PCP Dr. Sang Edmondson with phone number [phone].  Associated Diagnoses: Type 2 diabetes mellitus with hyperglycemia, unspecified whether long  term insulin use (H)       !! - Potential duplicate medications found. Please discuss with provider.        STOP taking these medications       lisinopril (ZESTRIL) 10 MG tablet Comments:   Reason for Stopping:             Allergies   No Known Allergies

## 2022-01-02 NOTE — PROGRESS NOTES
A&O. SBA w. RA. Reg diet. . R foot CDI. Non weight bearing. SL. Denies pain. Refused Osei rt location in relation to work, placement pending.

## 2022-01-03 NOTE — ADDENDUM NOTE
Addendum  created 01/03/22 0949 by Kasie Sanders MD    Intraprocedure Event edited, Intraprocedure Staff edited

## 2022-01-04 NOTE — PLAN OF CARE
Physical Therapy Discharge Summary    Reason for therapy discharge:    Discharged to Home with friends    Progress towards therapy goal(s). See goals on Care Plan in [facility] electronic health record for goal details.  Goals not met.  Barriers to achieving goals:   limited tolerance for therapy, discharge from facility, and pt refusing to participate with therapy and comply with precautions.    Therapy recommendation(s):    No further therapy is recommended.

## 2022-10-24 NOTE — ANESTHESIA PREPROCEDURE EVALUATION
Anesthesia Pre-Procedure Evaluation    Patient: Chris Shea   MRN: [id number] : 1970        Preoperative Diagnosis: Infection [B99.9]    Procedure : Procedure(s):  DEBRIDEMENT, POSSIBLE BONE BIOPSY, RIGHT FOOT          Past Medical History:   Diagnosis Date     Methamphetamine abuse (H)      Right foot ulcer (H)      Type 2 diabetes mellitus with diabetic peripheral angiopathy without gangrene, without long-term current use of insulin (H)       Past Surgical History:   Procedure Laterality Date     INCISION AND DRAINAGE NECK, COMBINED N/A 2021    Procedure: INCISION AND DRAINAGE, POSTERIOR NECK;  Surgeon: Lucina Dodson MD;  Location: [facility]      No Known Allergies   Social History     Tobacco Use     Smoking status: Never Smoker     Smokeless tobacco: Not on file   Substance Use Topics     Alcohol use: No      Wt Readings from Last 1 Encounters:   21 108.9 kg (240 lb)        Anesthesia Evaluation   Pt has had prior anesthetic.     No history of anesthetic complications       ROS/MED HX  ENT/Pulmonary:     (+) tobacco use,  (-) sleep apnea   Neurologic:    (-) no CVA   Cardiovascular:     (+) -Peripheral Vascular Disease-- Carotid Stenosis. --- (-) hypertension   METS/Exercise Tolerance:     Hematologic:       Musculoskeletal:       GI/Hepatic:    (-) GERD and liver disease   Renal/Genitourinary:     (+) renal disease, type: CRI,     Endo:     (+) type I DM, Diabetic complications: neuropathy.     Psychiatric/Substance Use:     (+) Recreational drug usage: Meth.    Infectious Disease: Comment: COVID +  asymptomatic       Malignancy:       Other:               OUTSIDE LABS:  CBC:   Lab Results   Component Value Date    WBC 6.4 2021    WBC 9.9 2021    HGB 13.8 2021    HGB 12.3 (L) 2021    HCT 40.9 2021    HCT 37.9 (L) 2021     2021     (H) 2021     BMP:   Lab Results   Component Value Date     (L) 2021      Spoke with wife advised her of MD response , she verbalized understanding and wanted to know if she could get something in writing in 6 months . Advised her  that we could give  it to them in 6 months .    09/16/2021    POTASSIUM 4.2 12/20/2021    POTASSIUM 4.9 09/22/2021    CHLORIDE 90 (L) 12/20/2021    CHLORIDE 102 09/16/2021    CO2 32 12/20/2021    CO2 31 09/16/2021    BUN 25 12/20/2021    BUN 10 09/16/2021    CR 1.32 (H) 12/20/2021    CR 1.19 09/21/2021     (H) 12/21/2021     (H) 12/20/2021     COAGS: No results found for: PTT, INR, FIBR  POC: No results found for: BGM, HCG, HCGS  HEPATIC:   Lab Results   Component Value Date    ALBUMIN 2.5 (L) 12/20/2021    PROTTOTAL 8.5 12/20/2021    ALT 19 12/20/2021    AST 22 12/20/2021    ALKPHOS 97 12/20/2021    BILITOTAL 0.5 12/20/2021     OTHER:   Lab Results   Component Value Date    PH 7.37 09/12/2021    LACT 1.0 12/20/2021    A1C 9.7 (H) 12/20/2021    ELINOR 8.8 12/20/2021    MAG 1.8 09/22/2021    .0 (H) 12/20/2021       Anesthesia Plan    ASA Status:  3   NPO Status:  NPO Appropriate    Anesthesia Type: MAC.     - Reason for MAC: straight local not clinically adequate              Consents            Postoperative Care    Pain management: Multi-modal analgesia.   PONV prophylaxis: Ondansetron (or other 5HT-3)     Comments:                Kian Lizarraga MD

## 2023-01-01 NOTE — PLAN OF CARE
ATE & TIME: 0-21 [id number]  Cognitive Concerns/ Orientation : A/Ox4; forgetful. Lethargic after surgery, on capno   BEHAVIOR & AGGRESSION TOOL COLOR: Green-  CIWA SCORE: NA  ABNL VS/O2: VSS on RA ex tachy (100s)  MOBILITY: SBA  PAIN MANAGMENT:  PRN  IV dilaudid  Oxy and Tylenol available  DIET: CL  BOWEL/BLADDER: Continent. No BM this shift  ABNL LAB/BG: B, 214, WBC: 21.7  DRAIN/DEVICES: PIV infusing with IVF @100 ml/hr. on IV Zosyn and  Vanco.   TELEMETRY RHYTHM: ST   SKIN: large cellulitis to neck with abscess, had I and D done, dressing c/d/i. R foot ulcer,I and  D at bed side done per podiatry,dressing c/d/i. Rolando LE neuropathy baseline.   TESTS/PROCEDURES: I&D  of neck abscess and right foot  wound debridement at bedside  completed, WOC consult pending  D/C DATE: Pending cultures  OTHER IMPORTANT INFO: Hx of methamphetamine abuse. surgery ,ID and Podiatry following.   Addendum: patient was more alert at 1800, used the BR and refused capno, given Oxy codone 10mg for neck pain. Neck wound dressing saturated with blood and fell off, redressed with ABD pad and tape.     [Negative] : Genitourinary

## 2023-05-14 NOTE — PROGRESS NOTES
[facility]    Medicine Progress Note - Hospitalist Service       Date of Admission:  9/12/2021    Assessment & Plan           Chris Shea is a 50 year old male with hx of poorly-controlled DM2, chronic right foot ulcer, and methamphetamine abuse who was admitted on 9/12/2021 for sepsis due to a large neck abscess     Posterior neck cellulitis and MRSA abscess  Presented with 2 day history of progressive neck pain, redness, and swelling with purulent drainage after picking at his skin. On arrival, he was noted to be tachycardic with leukocytosis to 16.4k. In the ED, he was initiated on IV vancomycin and pip-tazo.    - CT neck showed Ill-defined, moderately extensive inflammatory process involving the posterior upper cervical and occipitocervical soft tissues. No evidence for discrete drainable abscess, bone involvement or deep intraspinal extension.  - General Surgery consulted and s/p I&D 9/14, operative cultures grew MRSA.    - ID consult appreciated, IV vancomycin and pip-tazo continued initially, Zosyn was discontinued once MRSA identified on operative culture.  - 9/12 blood cultures x2 NGTD  - Leukocytosis has resolved, plan is 10 days of bactrim at discharge.      Chronic right mid-foot ulcer  2 cm ulcer over plantar aspect of right mid-foot. States he follows at wound clinic through [facility].   - WOCN consult  - Podiatry consulted,s /p excisional debridement of R foot ulcer 9/14      Methamphetamine abuse  Admits to using methamphetamine twice weekly. Last used: 9/10. Denies IVDU. Was  diaphoretic, likely in early withdrawal.  - Psychiatry consulted-they feel he is not withdrawing and has not used frequently.  As needed hydroxyzine ordered, clonidine or benzodiazepine was not recommended.     DM2 with peripheral vascular disease, uncontrolled  BG>400 on arrival. 6/24/21 A1c 13.8. PTA on metformin 1000 mg BID  - Holding PTA metformin  - Started Lantus 20 units at  bedtime and premeal novolog added per carb counting.   - PTA Metformin at 500 mg twice daily.  Premeal NovoLog to 1 unit per 10 g carbohydrate.  - High SSI available  - discussed with patient regarding insulin at discharge, he is open to it though does not prefer. Nursing to continue to teach how to inject insulin meanwhile. He feels he will be ready by tomorrow.     Diet: Advance Diet as Tolerated: Regular Diet Adult; 9431-5422 Calories: Moderate Consistent CHO (4-6 CHO units/meal)    DVT Prophylaxis: Pneumatic Compression Devices  Tam Catheter: Not present  Central Lines: None  Code Status: Full Code      Disposition Plan   Expected discharge: 09/17/2021 once patient is able to inject insulin himself, safe dispo plan made.     The patient's care was discussed with the Bedside Nurse and Patient.    Luisa Barfield MD  Hospitalist Service  [facility]  Securely message with the Vocera Web Console (learn more here)  Text page via [employer] Paging/Directory      Clinically Significant Risk Factors Present on Admission                ______________________________________________________________________    Interval History   Patient was evaluated this morning.  Patient states pain at the I&D site has continued to improve.  Afebrile.  -He feels he will be ready for discharge tomorrow.    -Denies cough, dyspnea, chest pain, abdominal pain, diarrhea.    Data reviewed today: I reviewed all medications, new labs results over the last 24 hours. I personally reviewed no images or EKG's today.    Physical Exam   Vital Signs: Temp: 98  F (36.7  C) Temp src: Oral BP: (!) 148/100 Pulse: 87   Resp: 18 SpO2: 96 % O2 Device: None (Room air)    Weight: 250 lbs 0 oz    Gen: NAD, pleasant  HEENT: PERRLA EOMI. I&D'ed wound in nape of neck with packing, dressing is soaked with serous drainage, surrounding edema and erythema has markedly improved.    Resp: CTA b/l, no dyspnea.   CV: S1S2 regular, no murmur. No  tachycardia.   Abdo: soft, nontender, nondistended, bowel sounds present  Ext: calves nontender, well perfused. Rt foot I&D'ed wound not examined.   Neuro: AAOx3, CN grossly intact, no facial asymmetry      Data   Recent Labs   Lab 09/16/21  0927 09/16/21  0903 09/16/21  0631 09/15/21  1233 09/15/21  0911 09/14/21  0636 09/14/21  0442 09/13/21  1253 09/13/21  0917 09/12/21 2057 09/12/21  1820   WBC 9.6  --   --   --  14.2*  --  21.7*   < > 17.9*   < > 16.4*   HGB 12.2*  --   --   --  10.8*  --  12.2*   < > 12.2*   < > 13.2*   MCV 85  --   --   --  85  --  82   < > 82   < > 83     --   --   --  264  --  289   < > 286   < > 322     --   --   --  134  --   --   --  135   < > 133   POTASSIUM 3.7  --   --   --  4.0  --  3.8   < > 4.2   < > 4.3   CHLORIDE 102  --   --   --  102  --   --   --  102   < > 97   CO2 31  --   --   --  30  --   --   --  24   < > 26   BUN 10  --   --   --  10  --   --   --  15   < > 17   CR 1.14  --   --   --  1.11  --   --   --  1.15   < > 1.24   ANIONGAP 6  --   --   --  2*  --   --   --  9   < > 10   ELINOR 9.1  --   --   --  8.2*  --   --   --  8.5   < > 9.1   * 125* 150*   < > 251*   < >  --    < > 314*   < > 430*   ALBUMIN  --   --   --   --   --   --   --   --   --   --  2.6*   PROTTOTAL  --   --   --   --   --   --   --   --   --   --  7.9   BILITOTAL  --   --   --   --   --   --   --   --   --   --  0.6   ALKPHOS  --   --   --   --   --   --   --   --   --   --  178*   ALT  --   --   --   --   --   --   --   --   --   --  13   AST  --   --   --   --   --   --   --   --   --   --  7    < > = values in this interval not displayed.     No results found for this or any previous visit (from the past 24 hour(s)).   bilateral LE Active ROM was WFL  (within functional limits)/bilateral LE Passive ROM was WFL  (within functional limits)

## 2025-04-06 NOTE — CONSULTS
"Consult Date: 09/13/2021    PSYCHIATRY CONSULTATION    REQUESTING PHYSICIAN:  Dr. Nichole Ibarra    REASON FOR CONSULTATION:  Methamphetamine withdrawal.    IDENTIFYING DATA:  The patient is a 50-year-old gentleman with a known history of past cocaine abuse and recent amphetamine use.  He presented to the Emergency Room with apparent symptoms of cellulitis and a large neck abscess, which apparently came about because he was \"picking at his skin.\"  He has been convalescing on station 66, receiving IV antibiotics and has had some diaphoresis.    CHIEF COMPLAINT:  \"I just used it once, it was a mistake.\"    HISTORY OF PRESENT ILLNESS:  The patient is a 50-year-old gentleman with the above history who is convalescing on station 66.  He came to the ER with signs of an infection, neck abscess.  There is no drug screen available for review, but he did acknowledge using amphetamines a few days prior to admission.  He is very vague about this, but minimizes regular use and says that he is in recovery, works for a local [employer].  He has a distant history of abusing cocaine and went through treatment 16 years ago and had legal problems from that.  He has no mental health history.  He has no interest in pursuing treatment.  He does not acknowledge using benzodiazepines, alcohol, or other substances that would precipitate significant withdrawal.  He denies abusing opiates.  Currently, he has complaints of pain from his cellulitis.  They wrote for p.r.n. doses of clonidine and diazepam.    On further questioning, he is not really endorsing any convincing history of depression, anxiety, rosaline, hypomania, psychosis, eating disorder history, PTSD or other psychiatric disturbance.  He is fully oriented and cooperative during my visit.  He is not reporting thoughts of wanting to hurt self or others.  He expresses some insight into his situation and recognizes that he needs to get back on track and has no interest in pursuing " "treatment at this time.    PAST PSYCHIATRIC HISTORY:  Negative per patient report.    PAST CHEMICAL DEPENDENCY HISTORY:  Notable for history of cocaine abuse and amphetamine abuse, though he tends to minimize current use, saying he only used briefly on one occasion a few days prior to coming to the hospital and \"made a mistake.\"  He has a history of legal problems from cocaine addiction some years ago and went through treatment.    PAST MEDICAL HISTORY:  Per chart review includes neck abscess this admission, chronic right mid foot ulcer, type 2 diabetes.    PRIOR TO ADMISSION MEDICATIONS:  Glucophage and Advil.    FAMILY HISTORY:  Denies mental illness, chemical dependency or suicide.    SOCIAL HISTORY:  The patient is single, apparently lives locally, has a few biologic siblings and several half-siblings.  He has never been , does not have children.  He has a legal history relating to past issues with cocaine use, but says no legal issues are currently present.  He works for a local [employer].  He denies trauma history or  history.    REVIEW OF SYSTEMS:  A 10-point review of systems is positive for neck pain.  On skin exam with an erythematous abscess present.  Otherwise, remainder of the 10-point review of systems is negative.    MOST RECENT VITAL SIGNS:  Blood pressure 120/76, temperature 99.1, pulse 115, respiratory rate 16, oxygen saturation 97%.    MENTAL STATUS EXAMINATION:  Appearance:  The patient is a slightly flushed gentleman.  He is lying in bed.  He is fully cooperative and alert, seems to be a reasonable historian.  Speech is of normal rate flow and tone.  Use of language is appropriate.  Motor exam is calm.  Coordination, station and gait not tested.  Muscle strength and tone adequate.  Affect is pleasant, subdued.  Mood \"okay.\"  Thought process logical, coherent and goal-directed.  No loosening of associations, flight of ideas or formal thought disorder.  Thought content negative for " current hallucinations, delusions, paranoia, suicidal or homicidal ideation.  Insight and judgment are fair.  Cognitive exam:  The patient is fatigued, but oriented x 3.  Concentration intact.  Recent and remote memory intact.  General fund of knowledge average.    IMPRESSION:  The patient is a 50-year-old gentleman presenting with cellulitis, poorly managed type 2 diabetes.  He has a history of what appears to be an amphetamine use disorder and cocaine use disorder.  I do not think he is high risk for withdrawal and generally with amphetamine use, there really is no remedy in terms of treating withdrawal symptoms.  Obviously, if he had agitation/psychosis, we might consider something like Seroquel.  A p.r.n. dose of hydroxyzine is a reasonable choice, but I would not prescribe clonidine or benzodiazepines at this time.  He simply is not endorsing any history of substance use that would suggest active withdrawal from opiates or benzodiazepines that would be a concern.    DIAGNOSES:     1.  Amphetamine use disorder.  2.  Cocaine use disorder by history.  3.  Cellulitis/abscess.  4.  Poorly managed type 2 diabetes.    PLAN:     1.  Medical management as you are.  2.  I would discontinue Valium and clonidine as neither would appear to have a legitimate rationale at this point in terms of treating any kind of withdrawal.  3.  I wrote for a small dose of hydroxyzine 25 mg t.i.d. p.r.n. for breakthrough anxiety.  4.  Medical management as you are.  At this juncture, he does not have any interest in pursuing any sort of treatment, seems to have some insight into the need to maintain sobriety, which he has apparently done quite well with over the years.    Sohail Galvin MD        D: 2021   T: 2021   MT: JEAN MARIE    Name:     PADMINI ALEXANDER  MRN:      [id number]        Account:      [id number]   :      1970           Consult Date: 2021     Document: [id number]    cc:  Nichole Ibarra  MD    [employer]

## (undated) DEVICE — PACK HEAD NECK SEN15HNFSF

## (undated) DEVICE — DRSG ABDOMINAL 07 1/2X8" 7197D

## (undated) DEVICE — ESU PENCIL W/HOLSTER

## (undated) DEVICE — LINEN TOWEL PACK X5 5464

## (undated) DEVICE — SOL NACL 0.9% IRRIG 1000ML BOTTLE 2F7124

## (undated) DEVICE — ESU ELEC BLADE 2.75" COATED/INSULATED E1455

## (undated) DEVICE — DECANTER VIAL 2006S

## (undated) DEVICE — GLOVE PROTEXIS POWDER FREE 6.5 ORTHOPEDIC 2D73ET65

## (undated) DEVICE — SU VICRYL 3-0 SH 27" J316H

## (undated) DEVICE — GLOVE PROTEXIS W/NEU-THERA 8.0  2D73TE80

## (undated) DEVICE — SPONGE BALL KERLIX ROUND XL W/O STRING LATEX 4935

## (undated) DEVICE — SOL WATER IRRIG 1000ML BOTTLE 2F7114

## (undated) DEVICE — NDL 22GA 1.5"

## (undated) DEVICE — BNDG ELASTIC 4"X5YDS UNSTERILE 6611-40

## (undated) DEVICE — SU PROLENE 4-0 PC-3 18" 8634G

## (undated) DEVICE — SPECIMEN CULTURETTE DBL SWAB 220109

## (undated) DEVICE — SU PROLENE 3-0 FS-1 18" 8684G

## (undated) DEVICE — SYR 20ML SLIP TIP W/O NDL 302831

## (undated) DEVICE — PACKING NUGAUZE 1/4" PLAIN 7631

## (undated) DEVICE — SU VICRYL 3-0 PS-2 18" UND J497G

## (undated) DEVICE — GLOVE PROTEXIS W/NEU-THERA 7.5  2D73TE75

## (undated) DEVICE — DRAPE LAP W/ARMBOARD 29410

## (undated) DEVICE — PREP SKIN SCRUB TRAY 4461A

## (undated) DEVICE — NDL 25GA 1.5" 305127

## (undated) DEVICE — PACK EXTREMITY SOP15EXFSD

## (undated) DEVICE — NDL 19GA 1.5"

## (undated) DEVICE — SYR 10ML FINGER CONTROL W/O NDL 309695

## (undated) DEVICE — GLOVE PROTEXIS BLUE W/NEU-THERA 7.0  2D73EB70

## (undated) RX ORDER — DEXAMETHASONE SODIUM PHOSPHATE 4 MG/ML
INJECTION, SOLUTION INTRA-ARTICULAR; INTRALESIONAL; INTRAMUSCULAR; INTRAVENOUS; SOFT TISSUE
Status: DISPENSED
Start: 2021-09-14

## (undated) RX ORDER — ONDANSETRON 2 MG/ML
INJECTION INTRAMUSCULAR; INTRAVENOUS
Status: DISPENSED
Start: 2021-12-22

## (undated) RX ORDER — FENTANYL CITRATE 50 UG/ML
INJECTION, SOLUTION INTRAMUSCULAR; INTRAVENOUS
Status: DISPENSED
Start: 2021-12-21

## (undated) RX ORDER — GLYCOPYRROLATE 0.2 MG/ML
INJECTION, SOLUTION INTRAMUSCULAR; INTRAVENOUS
Status: DISPENSED
Start: 2021-09-14

## (undated) RX ORDER — ONDANSETRON 2 MG/ML
INJECTION INTRAMUSCULAR; INTRAVENOUS
Status: DISPENSED
Start: 2021-09-14

## (undated) RX ORDER — FENTANYL CITRATE 50 UG/ML
INJECTION, SOLUTION INTRAMUSCULAR; INTRAVENOUS
Status: DISPENSED
Start: 2021-12-22

## (undated) RX ORDER — BUPIVACAINE HYDROCHLORIDE AND EPINEPHRINE 5; 5 MG/ML; UG/ML
INJECTION, SOLUTION EPIDURAL; INTRACAUDAL; PERINEURAL
Status: DISPENSED
Start: 2021-09-14

## (undated) RX ORDER — PROPOFOL 10 MG/ML
INJECTION, EMULSION INTRAVENOUS
Status: DISPENSED
Start: 2021-12-22

## (undated) RX ORDER — BUPIVACAINE HYDROCHLORIDE 5 MG/ML
INJECTION, SOLUTION EPIDURAL; INTRACAUDAL
Status: DISPENSED
Start: 2021-12-22

## (undated) RX ORDER — LIDOCAINE HYDROCHLORIDE 20 MG/ML
INJECTION, SOLUTION EPIDURAL; INFILTRATION; INTRACAUDAL; PERINEURAL
Status: DISPENSED
Start: 2021-12-21

## (undated) RX ORDER — FENTANYL CITRATE 50 UG/ML
INJECTION, SOLUTION INTRAMUSCULAR; INTRAVENOUS
Status: DISPENSED
Start: 2021-09-14

## (undated) RX ORDER — LIDOCAINE HYDROCHLORIDE 20 MG/ML
INJECTION, SOLUTION EPIDURAL; INFILTRATION; INTRACAUDAL; PERINEURAL
Status: DISPENSED
Start: 2021-09-14

## (undated) RX ORDER — ACETAMINOPHEN 500 MG
TABLET ORAL
Status: DISPENSED
Start: 2021-09-14

## (undated) RX ORDER — KETAMINE HCL IN NACL, ISO-OSM 100MG/10ML
SYRINGE (ML) INJECTION
Status: DISPENSED
Start: 2021-09-14

## (undated) RX ORDER — PROPOFOL 10 MG/ML
INJECTION, EMULSION INTRAVENOUS
Status: DISPENSED
Start: 2021-12-21

## (undated) RX ORDER — DEXAMETHASONE SODIUM PHOSPHATE 4 MG/ML
INJECTION, SOLUTION INTRA-ARTICULAR; INTRALESIONAL; INTRAMUSCULAR; INTRAVENOUS; SOFT TISSUE
Status: DISPENSED
Start: 2021-12-21

## (undated) RX ORDER — KETAMINE HCL IN NACL, ISO-OSM 100MG/10ML
SYRINGE (ML) INJECTION
Status: DISPENSED
Start: 2021-12-22

## (undated) RX ORDER — PROPOFOL 10 MG/ML
INJECTION, EMULSION INTRAVENOUS
Status: DISPENSED
Start: 2021-09-14

## (undated) RX ORDER — ONDANSETRON 2 MG/ML
INJECTION INTRAMUSCULAR; INTRAVENOUS
Status: DISPENSED
Start: 2021-12-21

## (undated) RX ORDER — LIDOCAINE HYDROCHLORIDE 20 MG/ML
INJECTION, SOLUTION EPIDURAL; INFILTRATION; INTRACAUDAL; PERINEURAL
Status: DISPENSED
Start: 2021-12-22